# Patient Record
Sex: MALE | Race: BLACK OR AFRICAN AMERICAN | Employment: UNEMPLOYED | URBAN - METROPOLITAN AREA
[De-identification: names, ages, dates, MRNs, and addresses within clinical notes are randomized per-mention and may not be internally consistent; named-entity substitution may affect disease eponyms.]

---

## 2017-03-07 ENCOUNTER — HOSPITAL ENCOUNTER (EMERGENCY)
Age: 22
Discharge: HOME | End: 2017-03-08
Attending: INTERNAL MEDICINE
Payer: SELF-PAY

## 2017-03-07 VITALS — TEMPERATURE: 99.4 F | OXYGEN SATURATION: 100 % | HEART RATE: 94 BPM | RESPIRATION RATE: 20 BRPM

## 2017-03-07 DIAGNOSIS — R11.2 NON-INTRACTABLE VOMITING WITH NAUSEA, UNSPECIFIED VOMITING TYPE: Primary | ICD-10-CM

## 2017-03-07 PROCEDURE — 96374 THER/PROPH/DIAG INJ IV PUSH: CPT

## 2017-03-07 PROCEDURE — 99284 EMERGENCY DEPT VISIT MOD MDM: CPT

## 2017-03-08 LAB
AMPHET UR QL SCN: NEGATIVE
ANION GAP BLD CALC-SCNC: 6 MMOL/L (ref 5–15)
BARBITURATES UR QL SCN: NEGATIVE
BASOPHILS # BLD AUTO: 0 K/UL (ref 0–0.1)
BASOPHILS # BLD: 0 % (ref 0–1)
BENZODIAZ UR QL: NEGATIVE
BUN SERPL-MCNC: 26 MG/DL (ref 6–20)
BUN/CREAT SERPL: 21 (ref 12–20)
CALCIUM SERPL-MCNC: 8.8 MG/DL (ref 8.5–10.1)
CANNABINOIDS UR QL SCN: POSITIVE
CHLORIDE SERPL-SCNC: 96 MMOL/L (ref 97–108)
CO2 SERPL-SCNC: 32 MMOL/L (ref 21–32)
COCAINE UR QL SCN: NEGATIVE
CREAT SERPL-MCNC: 1.21 MG/DL (ref 0.7–1.3)
DRUG SCRN COMMENT,DRGCM: ABNORMAL
EOSINOPHIL # BLD: 0 K/UL (ref 0–0.4)
EOSINOPHIL NFR BLD: 0 % (ref 0–7)
ERYTHROCYTE [DISTWIDTH] IN BLOOD BY AUTOMATED COUNT: 12.4 % (ref 11.5–14.5)
ETHANOL SERPL-MCNC: <10 MG/DL
GLUCOSE SERPL-MCNC: 94 MG/DL (ref 65–100)
HCT VFR BLD AUTO: 50.4 % (ref 36.6–50.3)
HGB BLD-MCNC: 17.2 G/DL (ref 12.1–17)
LYMPHOCYTES # BLD AUTO: 29 % (ref 12–49)
LYMPHOCYTES # BLD: 0.8 K/UL (ref 0.8–3.5)
MCH RBC QN AUTO: 31 PG (ref 26–34)
MCHC RBC AUTO-ENTMCNC: 34.1 G/DL (ref 30–36.5)
MCV RBC AUTO: 91 FL (ref 80–99)
METHADONE UR QL: NEGATIVE
MONOCYTES # BLD: 0.5 K/UL (ref 0–1)
MONOCYTES NFR BLD AUTO: 17 % (ref 5–13)
NEUTS SEG # BLD: 1.5 K/UL (ref 1.8–8)
NEUTS SEG NFR BLD AUTO: 54 % (ref 32–75)
OPIATES UR QL: NEGATIVE
PCP UR QL: NEGATIVE
PLATELET # BLD AUTO: 114 K/UL (ref 150–400)
POTASSIUM SERPL-SCNC: 3.7 MMOL/L (ref 3.5–5.1)
RBC # BLD AUTO: 5.54 M/UL (ref 4.1–5.7)
SODIUM SERPL-SCNC: 134 MMOL/L (ref 136–145)
WBC # BLD AUTO: 2.8 K/UL (ref 4.1–11.1)

## 2017-03-08 PROCEDURE — 80307 DRUG TEST PRSMV CHEM ANLYZR: CPT | Performed by: INTERNAL MEDICINE

## 2017-03-08 PROCEDURE — 80048 BASIC METABOLIC PNL TOTAL CA: CPT | Performed by: INTERNAL MEDICINE

## 2017-03-08 PROCEDURE — 85025 COMPLETE CBC W/AUTO DIFF WBC: CPT | Performed by: INTERNAL MEDICINE

## 2017-03-08 PROCEDURE — 36415 COLL VENOUS BLD VENIPUNCTURE: CPT | Performed by: INTERNAL MEDICINE

## 2017-03-08 PROCEDURE — 74011250637 HC RX REV CODE- 250/637

## 2017-03-08 PROCEDURE — 74011250636 HC RX REV CODE- 250/636: Performed by: INTERNAL MEDICINE

## 2017-03-08 RX ORDER — SODIUM CHLORIDE 0.9 % (FLUSH) 0.9 %
5-10 SYRINGE (ML) INJECTION AS NEEDED
Status: DISCONTINUED | OUTPATIENT
Start: 2017-03-08 | End: 2017-03-08 | Stop reason: HOSPADM

## 2017-03-08 RX ORDER — PANTOPRAZOLE SODIUM 40 MG/1
40 TABLET, DELAYED RELEASE ORAL DAILY
Qty: 5 TAB | Refills: 0 | Status: SHIPPED | OUTPATIENT
Start: 2017-03-08 | End: 2017-03-13

## 2017-03-08 RX ORDER — ONDANSETRON 4 MG/1
4 TABLET, ORALLY DISINTEGRATING ORAL
Qty: 5 TAB | Refills: 0 | Status: SHIPPED | OUTPATIENT
Start: 2017-03-08 | End: 2017-10-13

## 2017-03-08 RX ORDER — ONDANSETRON 2 MG/ML
4 INJECTION INTRAMUSCULAR; INTRAVENOUS
Status: COMPLETED | OUTPATIENT
Start: 2017-03-08 | End: 2017-03-08

## 2017-03-08 RX ORDER — PANTOPRAZOLE SODIUM 40 MG/1
TABLET, DELAYED RELEASE ORAL
Status: COMPLETED
Start: 2017-03-08 | End: 2017-03-08

## 2017-03-08 RX ORDER — SODIUM CHLORIDE 0.9 % (FLUSH) 0.9 %
5-10 SYRINGE (ML) INJECTION EVERY 8 HOURS
Status: DISCONTINUED | OUTPATIENT
Start: 2017-03-08 | End: 2017-03-08 | Stop reason: HOSPADM

## 2017-03-08 RX ORDER — PANTOPRAZOLE SODIUM 40 MG/1
40 TABLET, DELAYED RELEASE ORAL
Status: COMPLETED | OUTPATIENT
Start: 2017-03-08 | End: 2017-03-08

## 2017-03-08 RX ADMIN — PANTOPRAZOLE SODIUM 40 MG: 40 TABLET, DELAYED RELEASE ORAL at 01:22

## 2017-03-08 RX ADMIN — Medication 10 ML: at 00:27

## 2017-03-08 RX ADMIN — ONDANSETRON HYDROCHLORIDE 4 MG: 2 INJECTION, SOLUTION INTRAMUSCULAR; INTRAVENOUS at 00:26

## 2017-03-08 NOTE — ED NOTES
Patient  given copy of dc instructions and 1 script(s). Patient (s verbalized understanding of instructions and script (s). Patient given a current medication reconciliation form and verbalized understanding of their medications. Patient  verbalized understanding of the importance of discussing medications with  his or her physician or clinic they will be following up with. Patient alert and oriented and in no acute distress. Patient discharged home ambulatory.

## 2017-03-08 NOTE — ED NOTES
Emergency Department Nursing Plan of Care       The Nursing Plan of Care is developed from the Nursing assessment and Emergency Department Attending provider initial evaluation. The plan of care may be reviewed in the ED Provider note.     The Plan of Care was developed with the following considerations:   Patient / Family readiness to learn indicated by:verbalized understanding  Persons(s) to be included in education: patient  Barriers to Learning/Limitations:No    [address], RN    3/8/2017   12:23 AM

## 2017-03-08 NOTE — DISCHARGE INSTRUCTIONS
Nausea and Vomiting: Care Instructions  Your Care Instructions    When you are nauseated, you may feel weak and sweaty and notice a lot of saliva in your mouth. Nausea often leads to vomiting. Most of the time you do not need to worry about nausea and vomiting, but they can be signs of other illnesses. Two common causes of nausea and vomiting are stomach flu and food poisoning. Nausea and vomiting from viral stomach flu will usually start to improve within 24 hours. Nausea and vomiting from food poisoning may last from 12 to 48 hours. The doctor has checked you carefully, but problems can develop later. If you notice any problems or new symptoms, get medical treatment right away. Follow-up care is a key part of your treatment and safety. Be sure to make and go to all appointments, and call your doctor if you are having problems. It's also a good idea to know your test results and keep a list of the medicines you take. How can you care for yourself at home? · To prevent dehydration, drink plenty of fluids, enough so that your urine is light yellow or clear like water. Choose water and other caffeine-free clear liquids until you feel better. If you have kidney, heart, or liver disease and have to limit fluids, talk with your doctor before you increase the amount of fluids you drink. · Rest in bed until you feel better. · When you are able to eat, try clear soups, mild foods, and liquids until all symptoms are gone for 12 to 48 hours. Other good choices include dry toast, crackers, cooked cereal, and gelatin dessert, such as Jell-O. When should you call for help? Call 911 anytime you think you may need emergency care. For example, call if:  · You passed out (lost consciousness). Call your doctor now or seek immediate medical care if:  · You have symptoms of dehydration, such as:  ¨ Dry eyes and a dry mouth. ¨ Passing only a little dark urine.   ¨ Feeling thirstier than usual.  · You have new or worsening belly pain. · You have a new or higher fever. · You vomit blood or what looks like coffee grounds. Watch closely for changes in your health, and be sure to contact your doctor if:  · You have ongoing nausea and vomiting. · Your vomiting is getting worse. · Your vomiting lasts longer than 2 days. · You are not getting better as expected. Where can you learn more? Go to http://elma-taqueria.info/. Enter 25 [id number] in the search box to learn more about \"Nausea and Vomiting: Care Instructions. \"  Current as of: May 27, 2016  Content Version: 11.1  © 9252-3614 [employer]. Care instructions adapted under license by [employer] (which disclaims liability or warranty for this information). If you have questions about a medical condition or this instruction, always ask your healthcare professional. [address] any warranty or liability for your use of this information. Nausea and Vomiting: Care Instructions  Your Care Instructions    When you are nauseated, you may feel weak and sweaty and notice a lot of saliva in your mouth. Nausea often leads to vomiting. Most of the time you do not need to worry about nausea and vomiting, but they can be signs of other illnesses. Two common causes of nausea and vomiting are stomach flu and food poisoning. Nausea and vomiting from viral stomach flu will usually start to improve within 24 hours. Nausea and vomiting from food poisoning may last from 12 to 48 hours. The doctor has checked you carefully, but problems can develop later. If you notice any problems or new symptoms, get medical treatment right away. Follow-up care is a key part of your treatment and safety. Be sure to make and go to all appointments, and call your doctor if you are having problems. It's also a good idea to know your test results and keep a list of the medicines you take. How can you care for yourself at home?   · To prevent dehydration, drink plenty of fluids, enough so that your urine is light yellow or clear like water. Choose water and other caffeine-free clear liquids until you feel better. If you have kidney, heart, or liver disease and have to limit fluids, talk with your doctor before you increase the amount of fluids you drink. · Rest in bed until you feel better. · When you are able to eat, try clear soups, mild foods, and liquids until all symptoms are gone for 12 to 48 hours. Other good choices include dry toast, crackers, cooked cereal, and gelatin dessert, such as Jell-O. When should you call for help? Call 911 anytime you think you may need emergency care. For example, call if:  · You passed out (lost consciousness). Call your doctor now or seek immediate medical care if:  · You have symptoms of dehydration, such as:  ¨ Dry eyes and a dry mouth. ¨ Passing only a little dark urine. ¨ Feeling thirstier than usual.  · You have new or worsening belly pain. · You have a new or higher fever. · You vomit blood or what looks like coffee grounds. Watch closely for changes in your health, and be sure to contact your doctor if:  · You have ongoing nausea and vomiting. · Your vomiting is getting worse. · Your vomiting lasts longer than 2 days. · You are not getting better as expected. Where can you learn more? Go to http://elma-taqueria.info/. Enter 25 [id number] in the search box to learn more about \"Nausea and Vomiting: Care Instructions. \"  Current as of: May 27, 2016  Content Version: 11.1  © 7711-5435 [employer]. Care instructions adapted under license by [employer] (which disclaims liability or warranty for this information). If you have questions about a medical condition or this instruction, always ask your healthcare professional. [address] any warranty or liability for your use of this information.

## 2017-03-08 NOTE — ED PROVIDER NOTES
HPI Comments: Danitza Alexis is a 22 y.o. Male who presents via EMS to the ED c/o fatigue, constipation, nausea, and vomiting x 3 days. Pt does not state any exacerbating or relieving factors for his sx's. Pt reports that he ate at a \"[employer]\" at [address] [place] [address] ~3 days ago before the sx's started. Pt reports that he tried to go to [facility] yesterday for treatment, but reports that he left before he could be treated due to the long wait time in the waiting room. Pt reports that his last episode of vomiting was ~30 minutes PTA. Upon evaluation, pt does not have any pain complaints. Pt denies having any other significant medical problems. Pt specifically denies fever, chest pain, SOB, and headaches. PCP: None  Social Hx: (+) tobacco usage, (-) EtOH intake, (-) Illicit drug usage; There are no other complaints, changes or physical findings at this time. This note is prepared by Diana Raman, acting as Scribe for Dione Hernandez MD.    The history is provided by the patient and the EMS personnel. No  was used. History reviewed. No pertinent past medical history. History reviewed. No pertinent surgical history. History reviewed. No pertinent family history. Social History     Social History    Marital status: SINGLE     Spouse name: N/A    Number of children: N/A    Years of education: N/A     Occupational History    Not on file. Social History Main Topics    Smoking status: Never Smoker    Smokeless tobacco: Not on file    Alcohol use No    Drug use: Yes     Special: Marijuana    Sexual activity: Yes     Partners: Female     Birth control/ protection: Condom     Other Topics Concern    Not on file     Social History Narrative         ALLERGIES: Review of patient's allergies indicates no known allergies. Review of Systems   Constitutional: Positive for fatigue. Negative for appetite change, chills and fever. HENT: Negative.   Negative for congestion, rhinorrhea, sinus pressure and sore throat. Eyes: Negative. Respiratory: Negative. Negative for cough, choking, chest tightness, shortness of breath and wheezing. Cardiovascular: Negative. Negative for chest pain, palpitations and leg swelling. Gastrointestinal: Positive for constipation, nausea and vomiting. Negative for abdominal pain and diarrhea. Endocrine: Negative. Genitourinary: Negative. Negative for difficulty urinating, dysuria, flank pain and urgency. Musculoskeletal: Negative. Skin: Negative. Neurological: Negative. Negative for dizziness, speech difficulty, weakness, light-headedness, numbness and headaches. Psychiatric/Behavioral: Negative. All other systems reviewed and are negative. Vitals:    03/07/17 2357   Pulse: 94   Resp: 20   Temp: 99.4 °F (37.4 °C)   SpO2: 100%            Physical Exam   Constitutional: He is oriented to person, place, and time. He appears well-developed and well-nourished. No distress. HENT:   Head: Normocephalic and atraumatic. Mouth/Throat: Oropharynx is clear and moist. No oropharyngeal exudate. Eyes: Conjunctivae and EOM are normal. Pupils are equal, round, and reactive to light. Neck: Normal range of motion. Neck supple. No JVD present. No tracheal deviation present. Cardiovascular: Normal rate, regular rhythm, normal heart sounds and intact distal pulses. No murmur heard. Pulmonary/Chest: Effort normal and breath sounds normal. No stridor. No respiratory distress. He has no wheezes. He has no rales. He exhibits no tenderness. Abdominal: Soft. He exhibits no distension. There is no tenderness. There is no rebound and no guarding. Musculoskeletal: Normal range of motion. He exhibits no edema or tenderness. Neurological: He is alert and oriented to person, place, and time. Skin: Skin is warm and dry. He is not diaphoretic. Psychiatric: He has a normal mood and affect.  His behavior is normal.   Nursing note and vitals reviewed. MDM  Number of Diagnoses or Management Options  Diagnosis management comments:     DDx: metabolic disorder, viral syndrome, food poisoning       Amount and/or Complexity of Data Reviewed  Clinical lab tests: ordered and reviewed  Obtain history from someone other than the patient: yes (EMS)  Review and summarize past medical records: yes    Patient Progress  Patient progress: stable    ED Course       Procedures      PROGRESS NOTE  1:14 AM  Pt was re-assessed. Pt reports that he is feeling better. Written by Roseanna Browne ED Scribe, as dictated by Marylen Mouse, MD.      LABORATORY TESTS:  Recent Results (from the past 12 hour(s))   CBC WITH AUTOMATED DIFF    Collection Time: 03/08/17 12:15 AM   Result Value Ref Range    WBC 2.8 (L) 4.1 - 11.1 K/uL    RBC 5.54 4.10 - 5.70 M/uL    HGB 17.2 (H) 12.1 - 17.0 g/dL    HCT 50.4 (H) 36.6 - 50.3 %    MCV 91.0 80.0 - 99.0 FL    MCH 31.0 26.0 - 34.0 PG    MCHC 34.1 30.0 - 36.5 g/dL    RDW 12.4 11.5 - 14.5 %    PLATELET 647 (L) 984 - 400 K/uL    NEUTROPHILS 54 32 - 75 %    LYMPHOCYTES 29 12 - 49 %    MONOCYTES 17 (H) 5 - 13 %    EOSINOPHILS 0 0 - 7 %    BASOPHILS 0 0 - 1 %    ABS. NEUTROPHILS 1.5 (L) 1.8 - 8.0 K/UL    ABS. LYMPHOCYTES 0.8 0.8 - 3.5 K/UL    ABS. MONOCYTES 0.5 0.0 - 1.0 K/UL    ABS. EOSINOPHILS 0.0 0.0 - 0.4 K/UL    ABS.  BASOPHILS 0.0 0.0 - 0.1 K/UL   METABOLIC PANEL, BASIC    Collection Time: 03/08/17 12:15 AM   Result Value Ref Range    Sodium 134 (L) 136 - 145 mmol/L    Potassium 3.7 3.5 - 5.1 mmol/L    Chloride 96 (L) 97 - 108 mmol/L    CO2 32 21 - 32 mmol/L    Anion gap 6 5 - 15 mmol/L    Glucose 94 65 - 100 mg/dL    BUN 26 (H) 6 - 20 MG/DL    Creatinine 1.21 0.70 - 1.30 MG/DL    BUN/Creatinine ratio 21 (H) 12 - 20      GFR est AA >60 >60 ml/min/1.73m2    GFR est non-AA >60 >60 ml/min/1.73m2    Calcium 8.8 8.5 - 10.1 MG/DL   DRUG SCREEN, URINE    Collection Time: 03/08/17 12:15 AM   Result Value Ref Range    AMPHETAMINE NEGATIVE NEG      BARBITURATES NEGATIVE  NEG      BENZODIAZEPINE NEGATIVE  NEG      COCAINE NEGATIVE  NEG      METHADONE NEGATIVE  NEG      OPIATES NEGATIVE  NEG      PCP(PHENCYCLIDINE) NEGATIVE  NEG      THC (TH-CANNABINOL) POSITIVE (A) NEG      Drug screen comment (NOTE)    ETHYL ALCOHOL    Collection Time: 17 12:15 AM   Result Value Ref Range    ALCOHOL(ETHYL),SERUM <10 <10 MG/DL       IMAGING RESULTS:  No orders to display       MEDICATIONS GIVEN:  Medications   sodium chloride (NS) flush 5-10 mL (10 mL IntraVENous Given 3/8/17 0027)   sodium chloride (NS) flush 5-10 mL (not administered)   pantoprazole (PROTONIX) tablet 40 mg (not administered)   ondansetron (ZOFRAN) injection 4 mg (4 mg IntraVENous Given 3/8/17 0026)       IMPRESSION:  1. Non-intractable vomiting with nausea, unspecified vomiting type        PLAN:  1. Current Discharge Medication List      START taking these medications    Details   pantoprazole (PROTONIX) 40 mg tablet Take 1 Tab by mouth daily for 5 days. Qty: 5 Tab, Refills: 0      ondansetron (ZOFRAN ODT) 4 mg disintegrating tablet Take 1 Tab by mouth every eight (8) hours as needed for Nausea for up to 5 doses. Qty: 5 Tab, Refills: 0         CONTINUE these medications which have NOT CHANGED    Details   mupirocin (BACTROBAN) 2 % ointment Apply  to affected area three (3) times daily. Apply to area for 10 days  Qty: 22 g, Refills: 0         STOP taking these medications       ibuprofen (MOTRIN) 600 mg tablet Comments:   Reason for Stoppin.   Follow-up Information     Follow up With Details Comments Contact Info    None   None ([phone]) Patient stated that they have no PCP      Jeanette Gill Lin 1428 In 2 days return to ED if more ill. Clear liquid diet for next 6 hours [facility]        Return to ED if worse     DISCHARGE NOTE  1:16 AM  The patient has been re-evaluated and is ready for discharge.  Reviewed available results with patient. Counseled pt on diagnosis and care plan. Pt has expressed understanding, and all questions have been answered. Pt agrees with plan and agrees to F/U as recommended, or return to the ED if their sxs worsen. Discharge instructions have been provided and explained to the pt, along with reasons to return to the ED. Written by Penny Bacon, ED Scribe, as dictated by Wendy Antunez MD.    This note is prepared by Penny Bacon, acting as Scribe for Wendy Antunez MD.    Wendy Antunez MD: The scribe's documentation has been prepared under my direction and personally reviewed by me in its entirety. I confirm that the note above accurately reflects all work, treatment, procedures, and medical decision making performed by me.

## 2017-06-18 ENCOUNTER — HOSPITAL ENCOUNTER (EMERGENCY)
Age: 22
Discharge: HOME | End: 2017-06-18
Attending: EMERGENCY MEDICINE
Payer: MEDICAID

## 2017-06-18 VITALS
HEIGHT: 72 IN | SYSTOLIC BLOOD PRESSURE: 138 MMHG | WEIGHT: 132 LBS | HEART RATE: 114 BPM | BODY MASS INDEX: 17.88 KG/M2 | DIASTOLIC BLOOD PRESSURE: 107 MMHG | TEMPERATURE: 98.2 F | RESPIRATION RATE: 16 BRPM

## 2017-06-18 DIAGNOSIS — L98.421 SKIN ULCER OF SACRUM, LIMITED TO BREAKDOWN OF SKIN (HCC): Primary | ICD-10-CM

## 2017-06-18 PROCEDURE — 99283 EMERGENCY DEPT VISIT LOW MDM: CPT

## 2017-06-18 NOTE — ED NOTES
Emergency Department Nursing Plan of Care       The Nursing Plan of Care is developed from the Nursing assessment and Emergency Department Attending provider initial evaluation. The plan of care may be reviewed in the ED Provider note.     The Plan of Care was developed with the following considerations:   Patient / Family readiness to learn indicated by:verbalized understanding  Persons(s) to be included in education: patient and family  Barriers to Learning/Limitations:No    Signed     Russell Rendon RN    6/18/2017   4:44 PM

## 2017-06-18 NOTE — ED PROVIDER NOTES
Patient is a 25 y.o. male presenting with skin ulcer. The history is provided by the patient. No  was used. Skin Ulcer    This is a new problem. The current episode started more than 2 days ago. The problem has not changed since onset. Associated with: paraplegic wheelchair bound. There has been no fever. Affected Location: sacrum. The pain is at a severity of 0/10. The patient is experiencing no pain. Risk factors: ulcer. He has tried nothing for the symptoms. History reviewed. No pertinent past medical history. History reviewed. No pertinent surgical history. History reviewed. No pertinent family history. Social History     Social History    Marital status: SINGLE     Spouse name: N/A    Number of children: N/A    Years of education: N/A     Occupational History    Not on file. Social History Main Topics    Smoking status: Never Smoker    Smokeless tobacco: Not on file    Alcohol use No    Drug use: Yes     Special: Marijuana    Sexual activity: Yes     Partners: Female     Birth control/ protection: Condom     Other Topics Concern    Not on file     Social History Narrative         ALLERGIES: Review of patient's allergies indicates no known allergies. Review of Systems   Constitutional: Negative for chills, fatigue and fever. HENT: Negative for congestion and sore throat. Eyes: Negative for redness. Respiratory: Negative for cough, chest tightness and wheezing. Cardiovascular: Negative for chest pain. Gastrointestinal: Negative for abdominal pain. Genitourinary: Negative for dysuria. Musculoskeletal: Negative for neck pain. Skin: Negative for rash. Neurological: Negative for dizziness, syncope, weakness, light-headedness, numbness and headaches. Paraplegia   Hematological: Negative for adenopathy. Psychiatric/Behavioral: Negative for agitation and behavioral problems. All other systems reviewed and are negative.       Vitals: 06/18/17 1542   BP: (!) 138/107   Pulse: (!) 114   Resp: 16   Temp: 98.2 °F (36.8 °C)   Weight: 59.9 kg (132 lb)   Height: 6' (1.829 m)            Physical Exam   Constitutional: He is oriented to person, place, and time. He appears well-developed and well-nourished. HENT:   Head: Normocephalic and atraumatic. Right Ear: External ear normal.   Mouth/Throat: Oropharynx is clear and moist.   Eyes: Conjunctivae are normal. Right eye exhibits no discharge. Left eye exhibits no discharge. Neck: Normal range of motion. Neck supple. Cardiovascular: Normal rate and regular rhythm. Pulmonary/Chest: Effort normal and breath sounds normal. No respiratory distress. He has no wheezes. Abdominal: Soft. Bowel sounds are normal. There is no tenderness. Musculoskeletal:   Paraplegic    Lymphadenopathy:     He has no cervical adenopathy. Neurological: He is alert and oriented to person, place, and time. No cranial nerve deficit. Skin: Skin is warm and dry. Psychiatric: He has a normal mood and affect. His behavior is normal. Judgment and thought content normal.   Nursing note and vitals reviewed. MDM  Number of Diagnoses or Management Options  Diagnosis management comments: DDX skin ulcer stage 1 v 2       Amount and/or Complexity of Data Reviewed  Review and summarize past medical records: yes  Discuss the patient with other providers: yes      ED Course       Procedures  IMPRESSION  Skin ulcer  PLAN  Pressure dressing   F/. u wound care and PCP

## 2017-06-18 NOTE — DISCHARGE INSTRUCTIONS
Pressure Sores: Care Instructions  Your Care Instructions    A pressure sore is an injury to the skin caused by constant pressure. These sores--also called decubitus ulcers or bedsores--may happen when you lie in bed or sit in a wheelchair for a long time. The constant pressure blocks the blood supply to the skin. This causes skin cells to die and creates a sore. Pressure sores usually occur over bony areas, such as the hips, lower back, elbows, heels, and shoulders. They also can occur in places where the skin folds over on itself. You may have mild redness or open sores that are harder to heal.  Good care at home can help heal pressure sores. You or your caregiver needs to check your skin every day for sores. You need good nutrition and plenty of fluids to keep your skin healthy and prevent new pressure sores. Follow-up care is a key part of your treatment and safety. Be sure to make and go to all appointments, and call your doctor if you are having problems. It's also a good idea to know your test results and keep a list of the medicines you take. How can you care for yourself at home? · If your doctor prescribed a medicated ointment or cream, use it exactly as prescribed. Call your doctor if you think you are having a problem with your medicine. · Wash pressure sores every day, or as often as your doctor recommends. Most tap water is safe, but follow the advice of your doctor or nurse. He or she may recommend that you use a saline solution. This is a salt and water solution that you can buy over the counter. · Put on bandages as your doctor or wound care specialist says. · Keep healthy tissue around the sore clean and dry. · Check your skin every day for sores (or have a caregiver do it). · If you know what is causing the pressure that caused the sore, find a way to remove that pressure. To prevent pressure sores  · Change your position or have your caregiver help you change your position often. You may need to do this every 2 hours if you are in bed or every 15 minutes if you are in a wheelchair. This lowers the chance of making sores worse and getting new sores. · Use special mattresses or other support. These may include low-pressure mattresses or cushions made of foam that can be filled with air, water, beads, or fiber. · Eat healthy foods with plenty of protein to help heal damaged skin and to help new skin grow. · Try to stay at a healthy weight. Being overweight can lead to more pressure on your skin. · Do not slide across sheets or slump in a chair or bed. · Do not smoke. Smoking dries the skin and reduces its blood supply. If you need help quitting, talk to your doctor about stop-smoking programs and medicines. These can increase your chances of quitting for good. When should you call for help? Call your doctor now or seek immediate medical care if:  · You have signs of infection, such as:  ¨ Increased pain, swelling, warmth, or redness. ¨ Red streaks leading from the sore. ¨ Pus draining from the sore. ¨ A fever. Watch closely for changes in your health, and be sure to contact your doctor if:  · Your pressure sores are not healing. · You have new pressure sores. · You need help changing positions in bed or in a chair. · Your caregiver needs help to move you. Where can you learn more? Go to http://elma-taqueria.info/. Enter F114 in the search box to learn more about \"Pressure Sores: Care Instructions. \"  Current as of: June 4, 2016  Content Version: 11.2  © 4457-9329 [employer]. Care instructions adapted under license by [employer] (which disclaims liability or warranty for this information). If you have questions about a medical condition or this instruction, always ask your healthcare professional. [address] any warranty or liability for your use of this information.

## 2017-08-24 ENCOUNTER — HOSPITAL ENCOUNTER (EMERGENCY)
Age: 22
Discharge: HOME | End: 2017-08-24
Attending: INTERNAL MEDICINE
Payer: MEDICAID

## 2017-08-24 VITALS
HEIGHT: 72 IN | HEART RATE: 104 BPM | TEMPERATURE: 98.1 F | SYSTOLIC BLOOD PRESSURE: 116 MMHG | OXYGEN SATURATION: 100 % | BODY MASS INDEX: 17.61 KG/M2 | WEIGHT: 130 LBS | RESPIRATION RATE: 18 BRPM | DIASTOLIC BLOOD PRESSURE: 71 MMHG

## 2017-08-24 DIAGNOSIS — G89.4 CHRONIC PAIN SYNDROME: ICD-10-CM

## 2017-08-24 DIAGNOSIS — L89.153 DECUBITUS ULCER OF SACRAL REGION, STAGE 3 (HCC): ICD-10-CM

## 2017-08-24 DIAGNOSIS — G82.20 PARAPLEGIA FOLLOWING SPINAL CORD INJURY (HCC): Primary | ICD-10-CM

## 2017-08-24 PROCEDURE — 74011250637 HC RX REV CODE- 250/637: Performed by: INTERNAL MEDICINE

## 2017-08-24 PROCEDURE — 99284 EMERGENCY DEPT VISIT MOD MDM: CPT

## 2017-08-24 RX ORDER — GABAPENTIN 100 MG/1
400 CAPSULE ORAL
Status: COMPLETED | OUTPATIENT
Start: 2017-08-24 | End: 2017-08-24

## 2017-08-24 RX ORDER — GABAPENTIN 600 MG/1
600 TABLET ORAL 3 TIMES DAILY
Qty: 15 TAB | Refills: 0 | Status: SHIPPED | OUTPATIENT
Start: 2017-08-24 | End: 2017-08-29

## 2017-08-24 RX ADMIN — GABAPENTIN 400 MG: 100 CAPSULE ORAL at 02:35

## 2017-08-24 NOTE — ED NOTES
Patient (s) was given copy of dc instructions and one paper script(s) and no electronic scripts. Patient (s) has verbalized understanding of instructions and script (s). Patient given a current medication reconciliation form and verbalized understanding of their medications. Patient (s) has verbalized understanding of the importance of discussing medications with  his or her physician or clinic they will be following up with. Patient alert and oriented and in no acute distress. Patient offered wheelchair from treatment area to hospital entrance, patient used his own wheelchair. Patient left ED with family.

## 2017-08-24 NOTE — DISCHARGE INSTRUCTIONS
Pressure Sores: Care Instructions  Your Care Instructions    A pressure sore is an injury to the skin caused by constant pressure. These sores--also called decubitus ulcers or bedsores--may happen when you lie in bed or sit in a wheelchair for a long time. The constant pressure blocks the blood supply to the skin. This causes skin cells to die and creates a sore. Pressure sores usually occur over bony areas, such as the hips, lower back, elbows, heels, and shoulders. They also can occur in places where the skin folds over on itself. You may have mild redness or open sores that are harder to heal.  Good care at home can help heal pressure sores. You or your caregiver needs to check your skin every day for sores. You need good nutrition and plenty of fluids to keep your skin healthy and prevent new pressure sores. Follow-up care is a key part of your treatment and safety. Be sure to make and go to all appointments, and call your doctor if you are having problems. It's also a good idea to know your test results and keep a list of the medicines you take. How can you care for yourself at home? · If your doctor prescribed a medicated ointment or cream, use it exactly as prescribed. Call your doctor if you think you are having a problem with your medicine. · Wash pressure sores every day, or as often as your doctor recommends. Most tap water is safe, but follow the advice of your doctor or nurse. He or she may recommend that you use a saline solution. This is a salt and water solution that you can buy over the counter. · Put on bandages as your doctor or wound care specialist says. · Keep healthy tissue around the sore clean and dry. · Check your skin every day for sores (or have a caregiver do it). · If you know what is causing the pressure that caused the sore, find a way to remove that pressure. To prevent pressure sores  · Change your position or have your caregiver help you change your position often. You may need to do this every 2 hours if you are in bed or every 15 minutes if you are in a wheelchair. This lowers the chance of making sores worse and getting new sores. · Use special mattresses or other support. These may include low-pressure mattresses or cushions made of foam that can be filled with air, water, beads, or fiber. · Eat healthy foods with plenty of protein to help heal damaged skin and to help new skin grow. · Try to stay at a healthy weight. Being overweight can lead to more pressure on your skin. · Do not slide across sheets or slump in a chair or bed. · Do not smoke. Smoking dries the skin and reduces its blood supply. If you need help quitting, talk to your doctor about stop-smoking programs and medicines. These can increase your chances of quitting for good. When should you call for help? Call your doctor now or seek immediate medical care if:  · You have signs of infection, such as:  ¨ Increased pain, swelling, warmth, or redness. ¨ Red streaks leading from the sore. ¨ Pus draining from the sore. ¨ A fever. Watch closely for changes in your health, and be sure to contact your doctor if:  · Your pressure sores are not healing. · You have new pressure sores. · You need help changing positions in bed or in a chair. · Your caregiver needs help to move you. Where can you learn more? Go to http://elma-taqueria.info/. Enter F114 in the search box to learn more about \"Pressure Sores: Care Instructions. \"  Current as of: March 20, 2017  Content Version: 11.3  © 3043-6536 [employer]. Care instructions adapted under license by [employer] (which disclaims liability or warranty for this information). If you have questions about a medical condition or this instruction, always ask your healthcare professional. [address] any warranty or liability for your use of this information.          Pressure Sores: Care Instructions  Your Care Instructions    A pressure sore is an injury to the skin caused by constant pressure. These sores--also called decubitus ulcers or bedsores--may happen when you lie in bed or sit in a wheelchair for a long time. The constant pressure blocks the blood supply to the skin. This causes skin cells to die and creates a sore. Pressure sores usually occur over bony areas, such as the hips, lower back, elbows, heels, and shoulders. They also can occur in places where the skin folds over on itself. You may have mild redness or open sores that are harder to heal.  Good care at home can help heal pressure sores. You or your caregiver needs to check your skin every day for sores. You need good nutrition and plenty of fluids to keep your skin healthy and prevent new pressure sores. Follow-up care is a key part of your treatment and safety. Be sure to make and go to all appointments, and call your doctor if you are having problems. It's also a good idea to know your test results and keep a list of the medicines you take. How can you care for yourself at home? · If your doctor prescribed a medicated ointment or cream, use it exactly as prescribed. Call your doctor if you think you are having a problem with your medicine. · Wash pressure sores every day, or as often as your doctor recommends. Most tap water is safe, but follow the advice of your doctor or nurse. He or she may recommend that you use a saline solution. This is a salt and water solution that you can buy over the counter. · Put on bandages as your doctor or wound care specialist says. · Keep healthy tissue around the sore clean and dry. · Check your skin every day for sores (or have a caregiver do it). · If you know what is causing the pressure that caused the sore, find a way to remove that pressure. To prevent pressure sores  · Change your position or have your caregiver help you change your position often.  You may need to do this every 2 hours if you are in bed or every 15 minutes if you are in a wheelchair. This lowers the chance of making sores worse and getting new sores. · Use special mattresses or other support. These may include low-pressure mattresses or cushions made of foam that can be filled with air, water, beads, or fiber. · Eat healthy foods with plenty of protein to help heal damaged skin and to help new skin grow. · Try to stay at a healthy weight. Being overweight can lead to more pressure on your skin. · Do not slide across sheets or slump in a chair or bed. · Do not smoke. Smoking dries the skin and reduces its blood supply. If you need help quitting, talk to your doctor about stop-smoking programs and medicines. These can increase your chances of quitting for good. When should you call for help? Call your doctor now or seek immediate medical care if:  · You have signs of infection, such as:  ¨ Increased pain, swelling, warmth, or redness. ¨ Red streaks leading from the sore. ¨ Pus draining from the sore. ¨ A fever. Watch closely for changes in your health, and be sure to contact your doctor if:  · Your pressure sores are not healing. · You have new pressure sores. · You need help changing positions in bed or in a chair. · Your caregiver needs help to move you. Where can you learn more? Go to http://elma-taqueria.info/. Enter F114 in the search box to learn more about \"Pressure Sores: Care Instructions. \"  Current as of: March 20, 2017  Content Version: 11.3  © 5937-8369 [employer]. Care instructions adapted under license by "[employer]" (which disclaims liability or warranty for this information). If you have questions about a medical condition or this instruction, always ask your healthcare professional. [address] any warranty or liability for your use of this information.
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
## 2017-10-11 ENCOUNTER — APPOINTMENT (OUTPATIENT)
Dept: GENERAL RADIOLOGY | Age: 22
End: 2017-10-11
Attending: PHYSICIAN ASSISTANT
Payer: MEDICAID

## 2017-10-11 ENCOUNTER — HOSPITAL ENCOUNTER (EMERGENCY)
Age: 22
Discharge: HOME | End: 2017-10-11
Attending: EMERGENCY MEDICINE | Admitting: INTERNAL MEDICINE
Payer: MEDICAID

## 2017-10-11 VITALS
HEART RATE: 129 BPM | SYSTOLIC BLOOD PRESSURE: 139 MMHG | RESPIRATION RATE: 23 BRPM | DIASTOLIC BLOOD PRESSURE: 79 MMHG | WEIGHT: 127.31 LBS | HEIGHT: 72 IN | TEMPERATURE: 100.5 F | BODY MASS INDEX: 17.24 KG/M2 | OXYGEN SATURATION: 100 %

## 2017-10-11 DIAGNOSIS — N39.0 URINARY TRACT INFECTION WITHOUT HEMATURIA, SITE UNSPECIFIED: Primary | ICD-10-CM

## 2017-10-11 LAB
ALBUMIN SERPL-MCNC: 2.7 G/DL (ref 3.5–5)
ALBUMIN/GLOB SERPL: 0.5 {RATIO} (ref 1.1–2.2)
ALP SERPL-CCNC: 86 U/L (ref 45–117)
ALT SERPL-CCNC: 20 U/L (ref 12–78)
AMORPH CRY URNS QL MICRO: ABNORMAL
AMPHET UR QL SCN: NEGATIVE
ANION GAP SERPL CALC-SCNC: 11 MMOL/L (ref 5–15)
APPEARANCE UR: ABNORMAL
AST SERPL-CCNC: 20 U/L (ref 15–37)
BACTERIA URNS QL MICRO: ABNORMAL /HPF
BARBITURATES UR QL SCN: NEGATIVE
BASOPHILS # BLD: 0 K/UL (ref 0–0.1)
BASOPHILS NFR BLD: 0 % (ref 0–1)
BENZODIAZ UR QL: NEGATIVE
BILIRUB SERPL-MCNC: 0.8 MG/DL (ref 0.2–1)
BILIRUB UR QL: NEGATIVE
BUN SERPL-MCNC: 10 MG/DL (ref 6–20)
BUN/CREAT SERPL: 18 (ref 12–20)
CALCIUM SERPL-MCNC: 9.3 MG/DL (ref 8.5–10.1)
CANNABINOIDS UR QL SCN: POSITIVE
CHLORIDE SERPL-SCNC: 92 MMOL/L (ref 97–108)
CO2 SERPL-SCNC: 25 MMOL/L (ref 21–32)
COCAINE UR QL SCN: NEGATIVE
COLOR UR: ABNORMAL
CREAT SERPL-MCNC: 0.56 MG/DL (ref 0.7–1.3)
DRUG SCRN COMMENT,DRGCM: ABNORMAL
EOSINOPHIL # BLD: 0 K/UL (ref 0–0.4)
EOSINOPHIL NFR BLD: 0 % (ref 0–7)
EPITH CASTS URNS QL MICRO: ABNORMAL /LPF
ERYTHROCYTE [DISTWIDTH] IN BLOOD BY AUTOMATED COUNT: 12.5 % (ref 11.5–14.5)
GLOBULIN SER CALC-MCNC: 5.6 G/DL (ref 2–4)
GLUCOSE SERPL-MCNC: 83 MG/DL (ref 65–100)
GLUCOSE UR STRIP.AUTO-MCNC: NEGATIVE MG/DL
HCT VFR BLD AUTO: 36.2 % (ref 36.6–50.3)
HGB BLD-MCNC: 12 G/DL (ref 12.1–17)
HGB UR QL STRIP: NEGATIVE
KETONES UR QL STRIP.AUTO: NEGATIVE MG/DL
LACTATE SERPL-SCNC: 0.8 MMOL/L (ref 0.4–2)
LEUKOCYTE ESTERASE UR QL STRIP.AUTO: ABNORMAL
LIPASE SERPL-CCNC: 59 U/L (ref 73–393)
LYMPHOCYTES # BLD: 1 K/UL (ref 0.8–3.5)
LYMPHOCYTES NFR BLD: 11 % (ref 12–49)
MCH RBC QN AUTO: 29.8 PG (ref 26–34)
MCHC RBC AUTO-ENTMCNC: 33.1 G/DL (ref 30–36.5)
MCV RBC AUTO: 89.8 FL (ref 80–99)
METHADONE UR QL: NEGATIVE
MONOCYTES # BLD: 0.5 K/UL (ref 0–1)
MONOCYTES NFR BLD: 5 % (ref 5–13)
NEUTS SEG # BLD: 8 K/UL (ref 1.8–8)
NEUTS SEG NFR BLD: 84 % (ref 32–75)
NITRITE UR QL STRIP.AUTO: NEGATIVE
OPIATES UR QL: NEGATIVE
PCP UR QL: NEGATIVE
PH UR STRIP: 7.5 [PH] (ref 5–8)
PLATELET # BLD AUTO: 393 K/UL (ref 150–400)
POTASSIUM SERPL-SCNC: 3.7 MMOL/L (ref 3.5–5.1)
PROT SERPL-MCNC: 8.3 G/DL (ref 6.4–8.2)
PROT UR STRIP-MCNC: NEGATIVE MG/DL
RBC # BLD AUTO: 4.03 M/UL (ref 4.1–5.7)
RBC #/AREA URNS HPF: ABNORMAL /HPF (ref 0–5)
SODIUM SERPL-SCNC: 128 MMOL/L (ref 136–145)
SP GR UR REFRACTOMETRY: 1.02 (ref 1–1.03)
UA: UC IF INDICATED,UAUC: ABNORMAL
UROBILINOGEN UR QL STRIP.AUTO: >8 EU/DL (ref 0.2–1)
WBC # BLD AUTO: 9.5 K/UL (ref 4.1–11.1)
WBC URNS QL MICRO: ABNORMAL /HPF (ref 0–4)

## 2017-10-11 PROCEDURE — 87077 CULTURE AEROBIC IDENTIFY: CPT | Performed by: PHYSICIAN ASSISTANT

## 2017-10-11 PROCEDURE — 87185 SC STD ENZYME DETCJ PER NZM: CPT | Performed by: PHYSICIAN ASSISTANT

## 2017-10-11 PROCEDURE — 74011000258 HC RX REV CODE- 258: Performed by: PHYSICIAN ASSISTANT

## 2017-10-11 PROCEDURE — 85025 COMPLETE CBC W/AUTO DIFF WBC: CPT | Performed by: PHYSICIAN ASSISTANT

## 2017-10-11 PROCEDURE — 74011250637 HC RX REV CODE- 250/637: Performed by: PHYSICIAN ASSISTANT

## 2017-10-11 PROCEDURE — 80307 DRUG TEST PRSMV CHEM ANLYZR: CPT | Performed by: PHYSICIAN ASSISTANT

## 2017-10-11 PROCEDURE — 71010 XR CHEST PORT: CPT

## 2017-10-11 PROCEDURE — 83605 ASSAY OF LACTIC ACID: CPT | Performed by: PHYSICIAN ASSISTANT

## 2017-10-11 PROCEDURE — 87086 URINE CULTURE/COLONY COUNT: CPT | Performed by: PHYSICIAN ASSISTANT

## 2017-10-11 PROCEDURE — 96365 THER/PROPH/DIAG IV INF INIT: CPT

## 2017-10-11 PROCEDURE — 99285 EMERGENCY DEPT VISIT HI MDM: CPT

## 2017-10-11 PROCEDURE — 87040 BLOOD CULTURE FOR BACTERIA: CPT | Performed by: PHYSICIAN ASSISTANT

## 2017-10-11 PROCEDURE — 80053 COMPREHEN METABOLIC PANEL: CPT | Performed by: PHYSICIAN ASSISTANT

## 2017-10-11 PROCEDURE — 94762 N-INVAS EAR/PLS OXIMTRY CONT: CPT

## 2017-10-11 PROCEDURE — 87491 CHLMYD TRACH DNA AMP PROBE: CPT | Performed by: PHYSICIAN ASSISTANT

## 2017-10-11 PROCEDURE — 93005 ELECTROCARDIOGRAM TRACING: CPT

## 2017-10-11 PROCEDURE — 87147 CULTURE TYPE IMMUNOLOGIC: CPT | Performed by: PHYSICIAN ASSISTANT

## 2017-10-11 PROCEDURE — 87186 SC STD MICRODIL/AGAR DIL: CPT | Performed by: PHYSICIAN ASSISTANT

## 2017-10-11 PROCEDURE — 96375 TX/PRO/DX INJ NEW DRUG ADDON: CPT

## 2017-10-11 PROCEDURE — 87076 CULTURE ANAEROBE IDENT EACH: CPT | Performed by: PHYSICIAN ASSISTANT

## 2017-10-11 PROCEDURE — 96361 HYDRATE IV INFUSION ADD-ON: CPT

## 2017-10-11 PROCEDURE — 81001 URINALYSIS AUTO W/SCOPE: CPT | Performed by: PHYSICIAN ASSISTANT

## 2017-10-11 PROCEDURE — 83690 ASSAY OF LIPASE: CPT | Performed by: PHYSICIAN ASSISTANT

## 2017-10-11 PROCEDURE — 74011250636 HC RX REV CODE- 250/636: Performed by: PHYSICIAN ASSISTANT

## 2017-10-11 PROCEDURE — 36415 COLL VENOUS BLD VENIPUNCTURE: CPT | Performed by: PHYSICIAN ASSISTANT

## 2017-10-11 RX ORDER — ACETAMINOPHEN 500 MG
1000 TABLET ORAL
Status: COMPLETED | OUTPATIENT
Start: 2017-10-11 | End: 2017-10-11

## 2017-10-11 RX ORDER — AZITHROMYCIN 250 MG/1
1000 TABLET, FILM COATED ORAL
Status: COMPLETED | OUTPATIENT
Start: 2017-10-11 | End: 2017-10-11

## 2017-10-11 RX ORDER — ONDANSETRON 2 MG/ML
4 INJECTION INTRAMUSCULAR; INTRAVENOUS
Status: COMPLETED | OUTPATIENT
Start: 2017-10-11 | End: 2017-10-11

## 2017-10-11 RX ORDER — SODIUM CHLORIDE 0.9 % (FLUSH) 0.9 %
5-10 SYRINGE (ML) INJECTION EVERY 8 HOURS
Status: DISCONTINUED | OUTPATIENT
Start: 2017-10-11 | End: 2017-10-11 | Stop reason: HOSPADM

## 2017-10-11 RX ORDER — CIPROFLOXACIN 500 MG/1
500 TABLET ORAL 2 TIMES DAILY
Qty: 14 TAB | Refills: 0 | Status: ON HOLD | OUTPATIENT
Start: 2017-10-11 | End: 2017-10-13 | Stop reason: SDDI

## 2017-10-11 RX ORDER — SODIUM CHLORIDE 0.9 % (FLUSH) 0.9 %
5-10 SYRINGE (ML) INJECTION AS NEEDED
Status: DISCONTINUED | OUTPATIENT
Start: 2017-10-11 | End: 2017-10-11 | Stop reason: HOSPADM

## 2017-10-11 RX ADMIN — ONDANSETRON 4 MG: 2 INJECTION, SOLUTION INTRAMUSCULAR; INTRAVENOUS at 17:04

## 2017-10-11 RX ADMIN — SODIUM CHLORIDE 1000 ML: 900 INJECTION, SOLUTION INTRAVENOUS at 17:44

## 2017-10-11 RX ADMIN — AZITHROMYCIN 1000 MG: 250 TABLET, FILM COATED ORAL at 19:20

## 2017-10-11 RX ADMIN — ACETAMINOPHEN 1000 MG: 500 TABLET ORAL at 17:04

## 2017-10-11 RX ADMIN — CEFTRIAXONE 1 G: 1 INJECTION, POWDER, FOR SOLUTION INTRAMUSCULAR; INTRAVENOUS at 18:33

## 2017-10-11 NOTE — ED NOTES
Discharge instructions were given to the patient by Rosa Fried RN. The patient left the Emergency Department via his wheelchair, alert and oriented and in no acute distress with 1 prescription. The patient was encouraged to call or return to the ED for worsening issues or problems and was encouraged to schedule a follow up appointment for continuing care. The patient verbalized understanding of discharge instructions and prescriptions, all questions were answered. The patient has no further concerns at this time.
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
## 2017-10-11 NOTE — ED NOTES
Bedside and Verbal shift change report given to Micheline RN (oncoming nurse) by Bill Hernandez RN (offgoing nurse). Report included the following information SBAR and Kardex.

## 2017-10-11 NOTE — DISCHARGE INSTRUCTIONS

## 2017-10-11 NOTE — ED NOTES
Bedside and Verbal shift change report given to Karen Champion RN (oncoming nurse) by Tracey Hillman RN (offgoing nurse). Report included the following information SBAR, ED Summary, MAR and Recent Results. Assumed pt care at this time.

## 2017-10-13 ENCOUNTER — HOSPITAL ENCOUNTER (OUTPATIENT)
Age: 22
Setting detail: OBSERVATION
Discharge: LEFT BEFORE DISCHARGE | DRG: 463 | End: 2017-10-14
Attending: EMERGENCY MEDICINE | Admitting: INTERNAL MEDICINE
Payer: MEDICAID

## 2017-10-13 DIAGNOSIS — N39.0 URINARY TRACT INFECTION ASSOCIATED WITH CATHETERIZATION OF URINARY TRACT, UNSPECIFIED INDWELLING URINARY CATHETER TYPE, SUBSEQUENT ENCOUNTER: ICD-10-CM

## 2017-10-13 DIAGNOSIS — T83.511D URINARY TRACT INFECTION ASSOCIATED WITH CATHETERIZATION OF URINARY TRACT, UNSPECIFIED INDWELLING URINARY CATHETER TYPE, SUBSEQUENT ENCOUNTER: ICD-10-CM

## 2017-10-13 DIAGNOSIS — R78.81 BACTEREMIA: Primary | ICD-10-CM

## 2017-10-13 DIAGNOSIS — A40.9 STREPTOCOCCAL SEPSIS (HCC): ICD-10-CM

## 2017-10-13 LAB
ALBUMIN SERPL-MCNC: 2.4 G/DL (ref 3.5–5)
ALBUMIN/GLOB SERPL: 0.4 {RATIO} (ref 1.1–2.2)
ALP SERPL-CCNC: 99 U/L (ref 45–117)
ALT SERPL-CCNC: 35 U/L (ref 12–78)
AMORPH CRY URNS QL MICRO: ABNORMAL
ANION GAP SERPL CALC-SCNC: 11 MMOL/L (ref 5–15)
APPEARANCE UR: ABNORMAL
AST SERPL-CCNC: 32 U/L (ref 15–37)
ATRIAL RATE: 141 BPM
BACTERIA SPEC CULT: ABNORMAL
BACTERIA URNS QL MICRO: NEGATIVE /HPF
BASOPHILS # BLD: 0 K/UL (ref 0–0.1)
BASOPHILS NFR BLD: 0 % (ref 0–1)
BILIRUB SERPL-MCNC: 0.5 MG/DL (ref 0.2–1)
BILIRUB UR QL CFM: NEGATIVE
BUN SERPL-MCNC: 14 MG/DL (ref 6–20)
BUN/CREAT SERPL: 23 (ref 12–20)
C TRACH DNA SPEC QL NAA+PROBE: NEGATIVE
CALCIUM SERPL-MCNC: 8.9 MG/DL (ref 8.5–10.1)
CALCULATED P AXIS, ECG09: 61 DEGREES
CALCULATED R AXIS, ECG10: 70 DEGREES
CALCULATED T AXIS, ECG11: 82 DEGREES
CC UR VC: ABNORMAL
CHLORIDE SERPL-SCNC: 98 MMOL/L (ref 97–108)
CO2 SERPL-SCNC: 25 MMOL/L (ref 21–32)
COLOR UR: ABNORMAL
CREAT SERPL-MCNC: 0.61 MG/DL (ref 0.7–1.3)
DIAGNOSIS, 93000: NORMAL
EOSINOPHIL # BLD: 0.1 K/UL (ref 0–0.4)
EOSINOPHIL NFR BLD: 1 % (ref 0–7)
EPITH CASTS URNS QL MICRO: ABNORMAL /LPF
ERYTHROCYTE [DISTWIDTH] IN BLOOD BY AUTOMATED COUNT: 12.7 % (ref 11.5–14.5)
GLOBULIN SER CALC-MCNC: 5.5 G/DL (ref 2–4)
GLUCOSE SERPL-MCNC: 97 MG/DL (ref 65–100)
GLUCOSE UR STRIP.AUTO-MCNC: NEGATIVE MG/DL
HCT VFR BLD AUTO: 34.1 % (ref 36.6–50.3)
HGB BLD-MCNC: 11.4 G/DL (ref 12.1–17)
HGB UR QL STRIP: NEGATIVE
KETONES UR QL STRIP.AUTO: ABNORMAL MG/DL
LACTATE SERPL-SCNC: 1.2 MMOL/L (ref 0.4–2)
LEUKOCYTE ESTERASE UR QL STRIP.AUTO: ABNORMAL
LYMPHOCYTES # BLD: 1.5 K/UL (ref 0.8–3.5)
LYMPHOCYTES NFR BLD: 28 % (ref 12–49)
MCH RBC QN AUTO: 29.9 PG (ref 26–34)
MCHC RBC AUTO-ENTMCNC: 33.4 G/DL (ref 30–36.5)
MCV RBC AUTO: 89.5 FL (ref 80–99)
MONOCYTES # BLD: 0.8 K/UL (ref 0–1)
MONOCYTES NFR BLD: 14 % (ref 5–13)
N GONORRHOEA DNA SPEC QL NAA+PROBE: NEGATIVE
NEUTS SEG # BLD: 3 K/UL (ref 1.8–8)
NEUTS SEG NFR BLD: 57 % (ref 32–75)
NITRITE UR QL STRIP.AUTO: NEGATIVE
P-R INTERVAL, ECG05: 120 MS
PH UR STRIP: 6.5 [PH] (ref 5–8)
PLATELET # BLD AUTO: 373 K/UL (ref 150–400)
POTASSIUM SERPL-SCNC: 3.5 MMOL/L (ref 3.5–5.1)
PROT SERPL-MCNC: 7.9 G/DL (ref 6.4–8.2)
PROT UR STRIP-MCNC: ABNORMAL MG/DL
Q-T INTERVAL, ECG07: 272 MS
QRS DURATION, ECG06: 100 MS
QTC CALCULATION (BEZET), ECG08: 416 MS
RBC # BLD AUTO: 3.81 M/UL (ref 4.1–5.7)
RBC #/AREA URNS HPF: ABNORMAL /HPF (ref 0–5)
SAMPLE TYPE: NORMAL
SERVICE CMNT-IMP: ABNORMAL
SERVICE CMNT-IMP: NORMAL
SODIUM SERPL-SCNC: 134 MMOL/L (ref 136–145)
SP GR UR REFRACTOMETRY: 1.01 (ref 1–1.03)
SPECIMEN SOURCE: NORMAL
UA: UC IF INDICATED,UAUC: ABNORMAL
UROBILINOGEN UR QL STRIP.AUTO: >8 EU/DL (ref 0.2–1)
VENTRICULAR RATE, ECG03: 141 BPM
WBC # BLD AUTO: 5.3 K/UL (ref 4.1–11.1)
WBC URNS QL MICRO: ABNORMAL /HPF (ref 0–4)

## 2017-10-13 PROCEDURE — 74011000258 HC RX REV CODE- 258: Performed by: NURSE PRACTITIONER

## 2017-10-13 PROCEDURE — 65270000032 HC RM SEMIPRIVATE

## 2017-10-13 PROCEDURE — 87040 BLOOD CULTURE FOR BACTERIA: CPT | Performed by: NURSE PRACTITIONER

## 2017-10-13 PROCEDURE — 74011250637 HC RX REV CODE- 250/637: Performed by: EMERGENCY MEDICINE

## 2017-10-13 PROCEDURE — 85025 COMPLETE CBC W/AUTO DIFF WBC: CPT | Performed by: NURSE PRACTITIONER

## 2017-10-13 PROCEDURE — 81001 URINALYSIS AUTO W/SCOPE: CPT | Performed by: NURSE PRACTITIONER

## 2017-10-13 PROCEDURE — 80053 COMPREHEN METABOLIC PANEL: CPT | Performed by: NURSE PRACTITIONER

## 2017-10-13 PROCEDURE — 36415 COLL VENOUS BLD VENIPUNCTURE: CPT | Performed by: NURSE PRACTITIONER

## 2017-10-13 PROCEDURE — 96372 THER/PROPH/DIAG INJ SC/IM: CPT

## 2017-10-13 PROCEDURE — 83605 ASSAY OF LACTIC ACID: CPT | Performed by: NURSE PRACTITIONER

## 2017-10-13 PROCEDURE — 74011250637 HC RX REV CODE- 250/637: Performed by: INTERNAL MEDICINE

## 2017-10-13 PROCEDURE — 96365 THER/PROPH/DIAG IV INF INIT: CPT

## 2017-10-13 PROCEDURE — 74011250636 HC RX REV CODE- 250/636: Performed by: NURSE PRACTITIONER

## 2017-10-13 PROCEDURE — 74011250636 HC RX REV CODE- 250/636: Performed by: INTERNAL MEDICINE

## 2017-10-13 PROCEDURE — 99218 HC RM OBSERVATION: CPT

## 2017-10-13 PROCEDURE — 77030011256 HC DRSG MEPILEX <16IN NO BORD MOLN -A

## 2017-10-13 PROCEDURE — 77030027688 HC DRSG MEPILEX 16-48IN NO BORD MOLN -A

## 2017-10-13 PROCEDURE — 99284 EMERGENCY DEPT VISIT MOD MDM: CPT

## 2017-10-13 RX ORDER — SODIUM CHLORIDE 0.9 % (FLUSH) 0.9 %
5-10 SYRINGE (ML) INJECTION EVERY 8 HOURS
Status: DISCONTINUED | OUTPATIENT
Start: 2017-10-13 | End: 2017-10-14 | Stop reason: HOSPADM

## 2017-10-13 RX ORDER — SODIUM CHLORIDE 9 MG/ML
150 INJECTION, SOLUTION INTRAVENOUS CONTINUOUS
Status: DISCONTINUED | OUTPATIENT
Start: 2017-10-13 | End: 2017-10-14 | Stop reason: HOSPADM

## 2017-10-13 RX ORDER — NORTRIPTYLINE HYDROCHLORIDE 25 MG/1
100 CAPSULE ORAL
Status: DISCONTINUED | OUTPATIENT
Start: 2017-10-13 | End: 2017-10-13

## 2017-10-13 RX ORDER — ENOXAPARIN SODIUM 100 MG/ML
40 INJECTION SUBCUTANEOUS EVERY 24 HOURS
Status: DISCONTINUED | OUTPATIENT
Start: 2017-10-13 | End: 2017-10-14

## 2017-10-13 RX ORDER — NORTRIPTYLINE HYDROCHLORIDE 25 MG/1
100 CAPSULE ORAL
COMMUNITY
End: 2018-02-12

## 2017-10-13 RX ORDER — GABAPENTIN 300 MG/1
1200 CAPSULE ORAL 3 TIMES DAILY
Status: DISCONTINUED | OUTPATIENT
Start: 2017-10-14 | End: 2017-10-14 | Stop reason: HOSPADM

## 2017-10-13 RX ORDER — TRAZODONE HYDROCHLORIDE 50 MG/1
50 TABLET ORAL
Status: DISCONTINUED | OUTPATIENT
Start: 2017-10-13 | End: 2017-10-14 | Stop reason: HOSPADM

## 2017-10-13 RX ORDER — SODIUM CHLORIDE 0.9 % (FLUSH) 0.9 %
5-10 SYRINGE (ML) INJECTION AS NEEDED
Status: DISCONTINUED | OUTPATIENT
Start: 2017-10-13 | End: 2017-10-14 | Stop reason: HOSPADM

## 2017-10-13 RX ORDER — METHOCARBAMOL 500 MG/1
1000 TABLET, FILM COATED ORAL 3 TIMES DAILY
Status: DISCONTINUED | OUTPATIENT
Start: 2017-10-14 | End: 2017-10-14 | Stop reason: HOSPADM

## 2017-10-13 RX ORDER — GABAPENTIN 600 MG/1
1200 TABLET ORAL 3 TIMES DAILY
COMMUNITY
End: 2019-02-13

## 2017-10-13 RX ORDER — METHOCARBAMOL 500 MG/1
1000 TABLET, FILM COATED ORAL
COMMUNITY
End: 2018-02-12

## 2017-10-13 RX ORDER — IBUPROFEN 600 MG/1
600 TABLET ORAL
Status: DISCONTINUED | OUTPATIENT
Start: 2017-10-13 | End: 2017-10-14 | Stop reason: HOSPADM

## 2017-10-13 RX ADMIN — TRAZODONE HYDROCHLORIDE 50 MG: 50 TABLET ORAL at 23:27

## 2017-10-13 RX ADMIN — SODIUM CHLORIDE 1000 ML: 900 INJECTION, SOLUTION INTRAVENOUS at 18:23

## 2017-10-13 RX ADMIN — CEFTRIAXONE SODIUM 1 G: 1 INJECTION, POWDER, FOR SOLUTION INTRAMUSCULAR; INTRAVENOUS at 16:36

## 2017-10-13 RX ADMIN — IBUPROFEN 600 MG: 600 TABLET, FILM COATED ORAL at 21:34

## 2017-10-13 RX ADMIN — SODIUM CHLORIDE 150 ML/HR: 900 INJECTION, SOLUTION INTRAVENOUS at 19:44

## 2017-10-13 RX ADMIN — ENOXAPARIN SODIUM 40 MG: 100 INJECTION SUBCUTANEOUS at 20:58

## 2017-10-13 NOTE — PROGRESS NOTES
Pt admitted remotely by myself, following discussion with ED physician, and review of labwork, radiology, old notes and patient care record. Care on [place] followed via [address] Record remotely , as well as with discussion with nursing staff on [place].   A/p   GM NEG BACTEREMIA  UTI    Ceftriaxone  UCX,BCX  IVF    Full hmp to follow

## 2017-10-13 NOTE — ROUTINE PROCESS
TRANSFER - OUT REPORT:    TELEPHONE Verbal report given to REJI MARQUEZ(name) on [facility]  being transferred to [facility] ([facility]) for routine progression of care       Report consisted of patients Situation, Background, Assessment and   Recommendations(SBAR). Information from the following report(s) SBAR, Kardex, ED Summary, Procedure Summary, Intake/Output, MAR, Recent Results and Cardiac Rhythm SINUS TACH was reviewed with the receiving nurse. Lines:   Peripheral IV 10/13/17 Left Forearm (Active)   Site Assessment Clean, dry, & intact 10/13/2017  4:44 PM   Phlebitis Assessment 0 10/13/2017  4:44 PM   Infiltration Assessment 0 10/13/2017  4:44 PM   Dressing Status Clean, dry, & intact 10/13/2017  4:44 PM   Dressing Type Transparent 10/13/2017  4:44 PM   Hub Color/Line Status Pink;Flushed;Patent 10/13/2017  4:44 PM   Action Taken Blood drawn 10/13/2017  4:44 PM        Opportunity for questions and clarification was provided.       Patient transported with:   Monitor

## 2017-10-13 NOTE — ED NOTES
Patient here today because states he was asked to return. Patient was here two days ago with urinary tract infection. Patient reports being prescribed antibiotics however states that he was not able to find transportation to get to the pharmacy to start those discharge prescriptions. Patient reports fevers prior to previous visit but denies fevers since then. Patient states \"I've been feeling fine. \"  Patient wheelchair bound. Emergency Department Nursing Plan of Care       The Nursing Plan of Care is developed from the Nursing assessment and Emergency Department Attending provider initial evaluation. The plan of care may be reviewed in the ED Provider note.     The Plan of Care was developed with the following considerations:   Patient / Family readiness to learn indicated by:verbalized understanding  Persons(s) to be included in education: patient  Barriers to Learning/Limitations:No    Signed     Chu Wallace RN    10/13/2017   4:18 PM

## 2017-10-13 NOTE — ED PROVIDER NOTES
HPI Comments: Pablo Villanueva is a 25 y.o. male presents to [facility] ED was evaluated 2 days ago in this ED. Patient called at home  today to return to ED for admission to hospital. Blood cultures oct 11 ( + strep A and gram negative rods.) past HX significant for paraplegia patient self caths   Patient specifically denies fever, chills, nausea, vomitng, chest pain, shortness of breath, headache, rash, diarrhea ,sweating or weight loss. Patient is a 25 y.o. male presenting with urinary tract infection. Bladder Infection    This is a new problem. The current episode started 2 days ago. The problem has not changed since onset. Quality: no pain. The pain is at a severity of 0/10. The patient is experiencing no pain. There has been no fever. There is no history of pyelonephritis. Pertinent negatives include no chills and no sweats. He has tried nothing for the symptoms. His past medical history is significant for recurrent UTIs. History reviewed. No pertinent past medical history. History reviewed. No pertinent surgical history. History reviewed. No pertinent family history. Social History     Social History    Marital status: SINGLE     Spouse name: N/A    Number of children: N/A    Years of education: N/A     Occupational History    Not on file. Social History Main Topics    Smoking status: Current Every Day Smoker    Smokeless tobacco: Never Used    Alcohol use No    Drug use: Yes     Special: Marijuana    Sexual activity: Yes     Partners: Male     Birth control/ protection: Condom     Other Topics Concern    Not on file     Social History Narrative         ALLERGIES: Review of patient's allergies indicates no known allergies. Review of Systems   Constitutional: Negative for chills, fatigue and fever. HENT: Negative for congestion and sore throat. Eyes: Negative for redness. Respiratory: Negative for cough, chest tightness and wheezing.     Cardiovascular: Negative for chest pain. Gastrointestinal: Negative for abdominal pain. Genitourinary: Negative for dysuria. Musculoskeletal: Negative for neck pain and neck stiffness. Chronic bilateral extremity pain   Skin: Negative for rash. Neurological: Negative for dizziness, syncope, weakness, light-headedness, numbness and headaches. Hematological: Negative for adenopathy. Psychiatric/Behavioral: Negative for agitation and behavioral problems. All other systems reviewed and are negative. Vitals:    10/13/17 1605   BP: 125/72   Pulse: (!) 131   Resp: 22   Temp: 99.6 °F (37.6 °C)   SpO2: 96%   Weight: 61.2 kg (135 lb)   Height: 6' (1.829 m)            Physical Exam   Constitutional: He is oriented to person, place, and time. He appears well-developed and well-nourished. HENT:   Head: Normocephalic and atraumatic. Right Ear: External ear normal.   Left Ear: External ear normal.   Mouth/Throat: Oropharynx is clear and moist.   Eyes: Conjunctivae are normal. Right eye exhibits no discharge. Left eye exhibits no discharge. Neck: Normal range of motion. Neck supple. Cardiovascular: Normal rate, regular rhythm and normal heart sounds. Pulmonary/Chest: Effort normal and breath sounds normal. No respiratory distress. He has no wheezes. Abdominal: Soft. Bowel sounds are normal. There is no tenderness. Musculoskeletal: He exhibits no edema. LE atrophy and immobility   Lymphadenopathy:     He has no cervical adenopathy. Neurological: He is alert and oriented to person, place, and time. No cranial nerve deficit. Skin: Skin is warm and dry. Psychiatric: He has a normal mood and affect. His behavior is normal. Judgment and thought content normal.   Nursing note and vitals reviewed.        MDM  Number of Diagnoses or Management Options  Bacteremia:   Streptococcal sepsis [facility]):   Urinary tract infection associated with catheterization of urinary tract, unspecified indwelling urinary catheter type, subsequent encounter:   Diagnosis management comments: DDX sepsis UTI bacteremia       Amount and/or Complexity of Data Reviewed  Clinical lab tests: ordered and reviewed  Review and summarize past medical records: yes  Discuss the patient with other providers: yes    Patient Progress  Patient progress: stable    ED Course       Procedures  CONSULT NOTE:     I  spoke with Dr Jaswant Santiago hospitalist   Discussed pt's hx, disposition, and available diagnostic and imaging results. Reviewed care plans. Consultant agrees with plans as outlined. Brian Seymour NP    Patient is being admitted to the hospital.  The results of their tests and reasons for their admission have been discussed with them and/or available family. They convey agreement and understanding for the need to be admitted and for their admission diagnosis. Consultation has been made with the inpatient physician specialist for hospitalization. LABORATORY TESTS:  Recent Results (from the past 12 hour(s))   CBC WITH AUTOMATED DIFF    Collection Time: 10/13/17  4:21 PM   Result Value Ref Range    WBC 5.3 4.1 - 11.1 K/uL    RBC 3.81 (L) 4.10 - 5.70 M/uL    HGB 11.4 (L) 12.1 - 17.0 g/dL    HCT 34.1 (L) 36.6 - 50.3 %    MCV 89.5 80.0 - 99.0 FL    MCH 29.9 26.0 - 34.0 PG    MCHC 33.4 30.0 - 36.5 g/dL    RDW 12.7 11.5 - 14.5 %    PLATELET 053 875 - 833 K/uL    NEUTROPHILS 57 32 - 75 %    LYMPHOCYTES 28 12 - 49 %    MONOCYTES 14 (H) 5 - 13 %    EOSINOPHILS 1 0 - 7 %    BASOPHILS 0 0 - 1 %    ABS. NEUTROPHILS 3.0 1.8 - 8.0 K/UL    ABS. LYMPHOCYTES 1.5 0.8 - 3.5 K/UL    ABS. MONOCYTES 0.8 0.0 - 1.0 K/UL    ABS. EOSINOPHILS 0.1 0.0 - 0.4 K/UL    ABS.  BASOPHILS 0.0 0.0 - 0.1 K/UL   METABOLIC PANEL, COMPREHENSIVE    Collection Time: 10/13/17  4:21 PM   Result Value Ref Range    Sodium 134 (L) 136 - 145 mmol/L    Potassium 3.5 3.5 - 5.1 mmol/L    Chloride 98 97 - 108 mmol/L    CO2 25 21 - 32 mmol/L    Anion gap 11 5 - 15 mmol/L    Glucose 97 65 - 100 mg/dL    BUN 14 6 - 20 MG/DL    Creatinine 0.61 (L) 0.70 - 1.30 MG/DL    BUN/Creatinine ratio 23 (H) 12 - 20      GFR est AA >60 >60 ml/min/1.73m2    GFR est non-AA >60 >60 ml/min/1.73m2    Calcium 8.9 8.5 - 10.1 MG/DL    Bilirubin, total 0.5 0.2 - 1.0 MG/DL    ALT (SGPT) 35 12 - 78 U/L    AST (SGOT) 32 15 - 37 U/L    Alk. phosphatase 99 45 - 117 U/L    Protein, total 7.9 6.4 - 8.2 g/dL    Albumin 2.4 (L) 3.5 - 5.0 g/dL    Globulin 5.5 (H) 2.0 - 4.0 g/dL    A-G Ratio 0.4 (L) 1.1 - 2.2     LACTIC ACID    Collection Time: 10/13/17  4:21 PM   Result Value Ref Range    Lactic acid 1.2 0.4 - 2.0 MMOL/L   URINALYSIS W/ REFLEX CULTURE    Collection Time: 10/13/17  5:30 PM   Result Value Ref Range    Color SHEY      Appearance CLOUDY (A) CLEAR      Specific gravity 1.015 1.003 - 1.030      pH (UA) 6.5 5.0 - 8.0      Protein TRACE (A) NEG mg/dL    Glucose NEGATIVE  NEG mg/dL    Ketone TRACE (A) NEG mg/dL    Blood NEGATIVE  NEG      Urobilinogen >8.0 (H) 0.2 - 1.0 EU/dL    Nitrites NEGATIVE  NEG      Leukocyte Esterase SMALL (A) NEG      WBC 0-4 0 - 4 /hpf    RBC 0-5 0 - 5 /hpf    Epithelial cells FEW FEW /lpf    Bacteria NEGATIVE  NEG /hpf    UA:UC IF INDICATED CULTURE NOT INDICATED BY UA RESULT CNI      Amorphous Crystals 4+ (A) NEG   BILIRUBIN, CONFIRM    Collection Time: 10/13/17  5:30 PM   Result Value Ref Range    Bilirubin UA, confirm NEGATIVE  NEG         IMAGING RESULTS:  No orders to display     No results found. MEDICATIONS GIVEN:  Medications   ibuprofen (MOTRIN) tablet 600 mg (not administered)   cefTRIAXone (ROCEPHIN) 2 g in 0.9% sodium chloride (MBP/ADV) 50 mL (not administered)   cefTRIAXone (ROCEPHIN) 1 g in 0.9% sodium chloride (MBP/ADV) 50 mL (0 g IntraVENous IV Completed 10/13/17 5676)   sodium chloride 0.9 % bolus infusion 1,000 mL (1,000 mL IntraVENous New Bag 10/13/17 4973)       IMPRESSION:  1. Bacteremia    2.  Urinary tract infection associated with catheterization of urinary tract, unspecified indwelling urinary catheter type, subsequent encounter    3. Streptococcal sepsis ([facility] [address]        PLAN:  1.  Admit to Dr Magda Leon

## 2017-10-13 NOTE — ROUTINE PROCESS
.. TRANSFER - IN REPORT:    Verbal report received from ALMA MUÑIZ (name) on Jorge Cisse  being received from ED (unit) for routine progression of care      Report consisted of patients Situation, Background, Assessment and   Recommendations(SBAR). Information from the following report(s) SBAR, Kardex, MAR, Accordion and Recent Results was reviewed with the receiving nurse. Opportunity for questions and clarification was provided. Assessment completed upon patients arrival to unit and care assumed.

## 2017-10-14 VITALS
SYSTOLIC BLOOD PRESSURE: 116 MMHG | BODY MASS INDEX: 15.31 KG/M2 | WEIGHT: 113 LBS | DIASTOLIC BLOOD PRESSURE: 70 MMHG | HEIGHT: 72 IN | RESPIRATION RATE: 18 BRPM | HEART RATE: 97 BPM | TEMPERATURE: 96.8 F | OXYGEN SATURATION: 100 %

## 2017-10-14 LAB
ANION GAP SERPL CALC-SCNC: 8 MMOL/L (ref 5–15)
BUN SERPL-MCNC: 10 MG/DL (ref 6–20)
BUN/CREAT SERPL: 20 (ref 12–20)
CALCIUM SERPL-MCNC: 8.3 MG/DL (ref 8.5–10.1)
CHLORIDE SERPL-SCNC: 105 MMOL/L (ref 97–108)
CO2 SERPL-SCNC: 26 MMOL/L (ref 21–32)
CREAT SERPL-MCNC: 0.49 MG/DL (ref 0.7–1.3)
ERYTHROCYTE [DISTWIDTH] IN BLOOD BY AUTOMATED COUNT: 12.7 % (ref 11.5–14.5)
GLUCOSE SERPL-MCNC: 100 MG/DL (ref 65–100)
HCT VFR BLD AUTO: 31.7 % (ref 36.6–50.3)
HGB BLD-MCNC: 10.3 G/DL (ref 12.1–17)
MCH RBC QN AUTO: 29.4 PG (ref 26–34)
MCHC RBC AUTO-ENTMCNC: 32.5 G/DL (ref 30–36.5)
MCV RBC AUTO: 90.6 FL (ref 80–99)
PLATELET # BLD AUTO: 325 K/UL (ref 150–400)
POTASSIUM SERPL-SCNC: 3.1 MMOL/L (ref 3.5–5.1)
RBC # BLD AUTO: 3.5 M/UL (ref 4.1–5.7)
SODIUM SERPL-SCNC: 139 MMOL/L (ref 136–145)
WBC # BLD AUTO: 5.1 K/UL (ref 4.1–11.1)

## 2017-10-14 PROCEDURE — 87086 URINE CULTURE/COLONY COUNT: CPT | Performed by: INTERNAL MEDICINE

## 2017-10-14 PROCEDURE — 99218 HC RM OBSERVATION: CPT

## 2017-10-14 PROCEDURE — 36415 COLL VENOUS BLD VENIPUNCTURE: CPT | Performed by: INTERNAL MEDICINE

## 2017-10-14 PROCEDURE — 74011250637 HC RX REV CODE- 250/637: Performed by: EMERGENCY MEDICINE

## 2017-10-14 PROCEDURE — 85027 COMPLETE CBC AUTOMATED: CPT | Performed by: INTERNAL MEDICINE

## 2017-10-14 PROCEDURE — 74011250636 HC RX REV CODE- 250/636: Performed by: INTERNAL MEDICINE

## 2017-10-14 PROCEDURE — 80048 BASIC METABOLIC PNL TOTAL CA: CPT | Performed by: INTERNAL MEDICINE

## 2017-10-14 RX ORDER — POTASSIUM CHLORIDE 750 MG/1
40 TABLET, FILM COATED, EXTENDED RELEASE ORAL
Status: COMPLETED | OUTPATIENT
Start: 2017-10-14 | End: 2017-10-14

## 2017-10-14 RX ORDER — ENOXAPARIN SODIUM 100 MG/ML
30 INJECTION SUBCUTANEOUS EVERY 24 HOURS
Status: DISCONTINUED | OUTPATIENT
Start: 2017-10-14 | End: 2017-10-14 | Stop reason: HOSPADM

## 2017-10-14 RX ADMIN — POTASSIUM CHLORIDE 40 MEQ: 750 TABLET, FILM COATED, EXTENDED RELEASE ORAL at 05:09

## 2017-10-14 RX ADMIN — SODIUM CHLORIDE 150 ML/HR: 900 INJECTION, SOLUTION INTRAVENOUS at 05:09

## 2017-10-14 RX ADMIN — METHOCARBAMOL 1000 MG: 500 TABLET ORAL at 08:34

## 2017-10-14 RX ADMIN — GABAPENTIN 1200 MG: 300 CAPSULE ORAL at 08:34

## 2017-10-14 NOTE — PROGRESS NOTES
[facility] PHARMACY DAILY ANTICOAGULANT ASSESSMENT    Estimated body mass index is 15.33 kg/(m^2) as calculated from the following:    Height as of this encounter: 182.9 cm (72\"). Weight as of this encounter: 51.3 kg (113 lb). Estimated Creatinine Clearance: 173 mL/min (based on Cr of 0.49). Lab Results   Component Value Date/Time    Creatinine (POC) 1.0 08/04/2014 03:52 PM    Creatinine 0.49 10/14/2017 12:17 AM     Lab Results   Component Value Date/Time    Hemoglobin (POC) 15.3 08/04/2014 03:52 PM    HGB 10.3 10/14/2017 12:17 AM     Lab Results   Component Value Date/Time    PLATELET 975 08/93/0646 12:17 AM     Assessment: hgb, plt, & SCr decreased; adjusted dose from 40 mg SQ daily to 30 mg SQ daily for CrCl >30 mL/min, BMI <40, & weight in a man of <57 kg.     Thank you,    Elyse Leong, PHARMD, BCPS  Contact: [phone]

## 2017-10-14 NOTE — PROGRESS NOTES
Patient arrived on unit at shift change, alert and orientated, able to transfer and turn from side to side. Patient stated he is independent with catheterization and BM; straight cath kit at bedside. Patients dressing fell off of sacrum pressure ulcer, dressing reapplied. Patient complained that he has trouble sleeping, requesting sleeping medication. Notified Serafin Gamez that patients home medication for pain and sleep are not in the [facility] and patient stating he doesn't sleep with the home medications, trazodone ordered. Bedside and Verbal shift change report given to Jeanette Meyers [phone] (oncoming nurse) by Ck Akins (offgoing nurse). Report included the following information SBAR, ED Summary, Intake/Output, MAR, Recent Results and Cardiac Rhythm Sinus Tach to NSR.

## 2017-10-14 NOTE — PROGRESS NOTES
Problem: Falls - Risk of  Goal: *Absence of Falls  Document Ezekiel Fall Risk and appropriate interventions in the flowsheet. Outcome: Progressing Towards Goal  Fall Risk Interventions:          Hourly rounding done. Fall precautions in place. Bed in lowest position, wheels locked. [place] footwear on pt. Fall risk band on pt wrist.  Call light within pt reach. Personal items within reach.                              Problem: Urinary Tract Infection - Adult  Goal: *Absence of infection signs and symptoms  Outcome: Progressing Towards Goal  Pt being treated appropriately for UTI
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
## 2017-10-14 NOTE — DISCHARGE SUMMARY
Same day admit and discharge. Please see H&P for details. PATIENT SIGNED AMA  Pt insisted on going home today, was advised of the need to stay in hospital for iv abx, and that the risks of declining treatment included septic shock and even death. Pt expressed understanding and insisted on leaving.

## 2017-10-14 NOTE — PROGRESS NOTES
[phone]) Pt arrive to unit  1930) Bedside shift change report given to Mone Johnson RN (oncoming nurse) by Jeff Oakley RN (offgoing nurse).  Report included the following information SBAR, Kardex, MAR, Accordion, Recent Results and Cardiac Rhythm ST.

## 2017-10-14 NOTE — PROGRESS NOTES
Pt left AMA. pt understanding risk of leaving AMA including sepsis and death. iv line removed. pt has all his belonging with him. pt left building with his girlfriend.

## 2017-10-14 NOTE — PROGRESS NOTES
[facility] Admission Pharmacy Medication Reconciliation     Recommendations:          -Review updated PTA list for appropriate meds to resume during this admission    Findings:   1) Added to PTA list:           -Gabapentin 600 mg tabs - Take 2 tabs (1200 mg) po q8h (last dose today)          -Methocarbamol 500 mg tabs - Take 2 tabs QID prn muscle spasms (last dose today)          -Nortriptyline 25 mg caps - Take 4 caps po qhs (last dose last week; pt states this med \"doesn't work\")  2) Discharged from [facility] ED on 10/11/17 with Ciprofloxacin 500 mg po BID x 7 days, but patient never started this due to transportation to pharmacy issues. [Urine culture C&S result today shows resistance to Ciprofloxacin]      Information obtained from: Patient interview, RX Query    Past Medical History/Disease States:  History reviewed. No pertinent past medical history. Patient allergies: Allergies as of 10/13/2017    (No Known Allergies)         Prior to Admission Medications   Prescriptions Last Dose Informant Patient Reported? Taking?   gabapentin (NEURONTIN) 600 mg tablet 10/13/2017  Yes Yes   Sig: Take 1,200 mg by mouth three (3) times daily. methocarbamol (ROBAXIN) 500 mg tablet 10/13/2017  Yes Yes   Sig: Take 1,000 mg by mouth four (4) times daily as needed. nortriptyline (PAMELOR) 25 mg capsule 10/6/2017  Yes Yes   Sig: Take 100 mg by mouth nightly.       Facility-Administered Medications: None            Thank you,  Osman Lennon, [facility]

## 2017-10-15 LAB
BACTERIA SPEC CULT: NORMAL
CC UR VC: NORMAL
SERVICE CMNT-IMP: NORMAL

## 2017-10-16 ENCOUNTER — TELEPHONE (OUTPATIENT)
Dept: CASE MANAGEMENT | Age: 22
End: 2017-10-16

## 2017-10-16 NOTE — TELEPHONE ENCOUNTER
CM called patient for the purpose of follow up to his inpatient admission from 10/13/17 to 10/14/17. Patient left AMA.   He informed CM on the call today that he is currently an inpatient at [address].

## 2017-10-17 LAB
BACTERIA SPEC CULT: ABNORMAL
SERVICE CMNT-IMP: ABNORMAL
SERVICE CMNT-IMP: ABNORMAL

## 2017-10-18 LAB
BACTERIA SPEC CULT: NORMAL
BACTERIA SPEC CULT: NORMAL
SERVICE CMNT-IMP: NORMAL
SERVICE CMNT-IMP: NORMAL

## 2018-02-12 ENCOUNTER — HOSPITAL ENCOUNTER (EMERGENCY)
Age: 23
Discharge: HOME | End: 2018-02-12
Attending: EMERGENCY MEDICINE | Admitting: EMERGENCY MEDICINE
Payer: MEDICAID

## 2018-02-12 VITALS
DIASTOLIC BLOOD PRESSURE: 85 MMHG | HEART RATE: 86 BPM | HEIGHT: 72 IN | WEIGHT: 120 LBS | BODY MASS INDEX: 16.25 KG/M2 | RESPIRATION RATE: 16 BRPM | OXYGEN SATURATION: 100 % | TEMPERATURE: 98.1 F | SYSTOLIC BLOOD PRESSURE: 134 MMHG

## 2018-02-12 DIAGNOSIS — N30.00 ACUTE CYSTITIS WITHOUT HEMATURIA: Primary | ICD-10-CM

## 2018-02-12 DIAGNOSIS — R11.2 NON-INTRACTABLE VOMITING WITH NAUSEA, UNSPECIFIED VOMITING TYPE: ICD-10-CM

## 2018-02-12 LAB
ALBUMIN SERPL-MCNC: 2.9 G/DL (ref 3.5–5)
ALBUMIN/GLOB SERPL: 0.5 {RATIO} (ref 1.1–2.2)
ALP SERPL-CCNC: 81 U/L (ref 45–117)
ALT SERPL-CCNC: 14 U/L (ref 12–78)
ANION GAP SERPL CALC-SCNC: 13 MMOL/L (ref 5–15)
APPEARANCE UR: ABNORMAL
AST SERPL-CCNC: 16 U/L (ref 15–37)
BACTERIA URNS QL MICRO: ABNORMAL /HPF
BASOPHILS # BLD: 0 K/UL (ref 0–0.1)
BASOPHILS NFR BLD: 0 % (ref 0–1)
BILIRUB SERPL-MCNC: 0.6 MG/DL (ref 0.2–1)
BILIRUB UR QL CFM: NEGATIVE
BUN SERPL-MCNC: 13 MG/DL (ref 6–20)
BUN/CREAT SERPL: 24 (ref 12–20)
CALCIUM SERPL-MCNC: 9.8 MG/DL (ref 8.5–10.1)
CHLORIDE SERPL-SCNC: 95 MMOL/L (ref 97–108)
CO2 SERPL-SCNC: 28 MMOL/L (ref 21–32)
COLOR UR: ABNORMAL
CREAT SERPL-MCNC: 0.55 MG/DL (ref 0.7–1.3)
DIFFERENTIAL METHOD BLD: ABNORMAL
EOSINOPHIL # BLD: 0 K/UL (ref 0–0.4)
EOSINOPHIL NFR BLD: 0 % (ref 0–7)
EPITH CASTS URNS QL MICRO: ABNORMAL /LPF
ERYTHROCYTE [DISTWIDTH] IN BLOOD BY AUTOMATED COUNT: 13.6 % (ref 11.5–14.5)
GLOBULIN SER CALC-MCNC: 5.8 G/DL (ref 2–4)
GLUCOSE SERPL-MCNC: 76 MG/DL (ref 65–100)
GLUCOSE UR STRIP.AUTO-MCNC: NEGATIVE MG/DL
HCT VFR BLD AUTO: 38.7 % (ref 36.6–50.3)
HGB BLD-MCNC: 12.9 G/DL (ref 12.1–17)
HGB UR QL STRIP: ABNORMAL
IMM GRANULOCYTES # BLD: 0 K/UL (ref 0–0.04)
IMM GRANULOCYTES NFR BLD AUTO: 1 % (ref 0–0.5)
KETONES UR QL STRIP.AUTO: NEGATIVE MG/DL
LEUKOCYTE ESTERASE UR QL STRIP.AUTO: ABNORMAL
LYMPHOCYTES # BLD: 1.7 K/UL (ref 0.8–3.5)
LYMPHOCYTES NFR BLD: 24 % (ref 12–49)
MCH RBC QN AUTO: 29.6 PG (ref 26–34)
MCHC RBC AUTO-ENTMCNC: 33.3 G/DL (ref 30–36.5)
MCV RBC AUTO: 88.8 FL (ref 80–99)
MONOCYTES # BLD: 0.7 K/UL (ref 0–1)
MONOCYTES NFR BLD: 10 % (ref 5–13)
NEUTS SEG # BLD: 4.5 K/UL (ref 1.8–8)
NEUTS SEG NFR BLD: 65 % (ref 32–75)
NITRITE UR QL STRIP.AUTO: POSITIVE
NRBC # BLD: 0 K/UL (ref 0–0.01)
NRBC BLD-RTO: 0 PER 100 WBC
PH UR STRIP: 6 [PH] (ref 5–8)
PLATELET # BLD AUTO: 337 K/UL (ref 150–400)
PMV BLD AUTO: 11.2 FL (ref 8.9–12.9)
POTASSIUM SERPL-SCNC: 3.6 MMOL/L (ref 3.5–5.1)
PROT SERPL-MCNC: 8.7 G/DL (ref 6.4–8.2)
PROT UR STRIP-MCNC: ABNORMAL MG/DL
RBC # BLD AUTO: 4.36 M/UL (ref 4.1–5.7)
RBC #/AREA URNS HPF: ABNORMAL /HPF (ref 0–5)
SODIUM SERPL-SCNC: 136 MMOL/L (ref 136–145)
SP GR UR REFRACTOMETRY: 1.02 (ref 1–1.03)
UA: UC IF INDICATED,UAUC: ABNORMAL
UROBILINOGEN UR QL STRIP.AUTO: 1 EU/DL (ref 0.2–1)
WBC # BLD AUTO: 6.9 K/UL (ref 4.1–11.1)
WBC URNS QL MICRO: ABNORMAL /HPF (ref 0–4)

## 2018-02-12 PROCEDURE — 96374 THER/PROPH/DIAG INJ IV PUSH: CPT

## 2018-02-12 PROCEDURE — 74011250637 HC RX REV CODE- 250/637: Performed by: EMERGENCY MEDICINE

## 2018-02-12 PROCEDURE — 36415 COLL VENOUS BLD VENIPUNCTURE: CPT | Performed by: EMERGENCY MEDICINE

## 2018-02-12 PROCEDURE — 87186 SC STD MICRODIL/AGAR DIL: CPT | Performed by: EMERGENCY MEDICINE

## 2018-02-12 PROCEDURE — 87077 CULTURE AEROBIC IDENTIFY: CPT | Performed by: EMERGENCY MEDICINE

## 2018-02-12 PROCEDURE — 74011250636 HC RX REV CODE- 250/636: Performed by: EMERGENCY MEDICINE

## 2018-02-12 PROCEDURE — 99284 EMERGENCY DEPT VISIT MOD MDM: CPT

## 2018-02-12 PROCEDURE — 87086 URINE CULTURE/COLONY COUNT: CPT | Performed by: EMERGENCY MEDICINE

## 2018-02-12 PROCEDURE — 81001 URINALYSIS AUTO W/SCOPE: CPT | Performed by: EMERGENCY MEDICINE

## 2018-02-12 PROCEDURE — 80053 COMPREHEN METABOLIC PANEL: CPT | Performed by: EMERGENCY MEDICINE

## 2018-02-12 PROCEDURE — 85025 COMPLETE CBC W/AUTO DIFF WBC: CPT | Performed by: EMERGENCY MEDICINE

## 2018-02-12 PROCEDURE — 96361 HYDRATE IV INFUSION ADD-ON: CPT

## 2018-02-12 RX ORDER — CIPROFLOXACIN 500 MG/1
500 TABLET ORAL 2 TIMES DAILY
Qty: 14 TAB | Refills: 0 | Status: SHIPPED | OUTPATIENT
Start: 2018-02-12 | End: 2018-02-19

## 2018-02-12 RX ORDER — ONDANSETRON 2 MG/ML
4 INJECTION INTRAMUSCULAR; INTRAVENOUS
Status: COMPLETED | OUTPATIENT
Start: 2018-02-12 | End: 2018-02-12

## 2018-02-12 RX ORDER — SODIUM CHLORIDE 0.9 % (FLUSH) 0.9 %
5-10 SYRINGE (ML) INJECTION AS NEEDED
Status: DISCONTINUED | OUTPATIENT
Start: 2018-02-12 | End: 2018-02-12 | Stop reason: HOSPADM

## 2018-02-12 RX ORDER — CIPROFLOXACIN 500 MG/1
500 TABLET ORAL
Status: COMPLETED | OUTPATIENT
Start: 2018-02-12 | End: 2018-02-12

## 2018-02-12 RX ORDER — ONDANSETRON 4 MG/1
4 TABLET, ORALLY DISINTEGRATING ORAL
Qty: 10 TAB | Refills: 0 | Status: SHIPPED | OUTPATIENT
Start: 2018-02-12 | End: 2018-03-21

## 2018-02-12 RX ORDER — SODIUM CHLORIDE 0.9 % (FLUSH) 0.9 %
5-10 SYRINGE (ML) INJECTION EVERY 8 HOURS
Status: DISCONTINUED | OUTPATIENT
Start: 2018-02-12 | End: 2018-02-12 | Stop reason: HOSPADM

## 2018-02-12 RX ADMIN — SODIUM CHLORIDE 1000 ML: 900 INJECTION, SOLUTION INTRAVENOUS at 18:43

## 2018-02-12 RX ADMIN — ONDANSETRON 4 MG: 2 INJECTION, SOLUTION INTRAMUSCULAR; INTRAVENOUS at 18:43

## 2018-02-12 RX ADMIN — CIPROFLOXACIN HYDROCHLORIDE 500 MG: 500 TABLET, FILM COATED ORAL at 19:35

## 2018-02-12 NOTE — ED TRIAGE NOTES
Patient c/o vomiting, nausea, body aches, chills x 2 days. Denies fever. Patient also c/o lower back pain x 2 weeks. Denies injury. Patient is a paraplegic from the waist down.

## 2018-02-12 NOTE — ED NOTES
Emergency Department Nursing Plan of Care       The Nursing Plan of Care is developed from the Nursing assessment and Emergency Department Attending provider initial evaluation. The plan of care may be reviewed in the ED Provider note. The Plan of Care was developed with the following considerations:   Patient / Family readiness to learn indicated by:verbalized understanding  Persons(s) to be included in education: patient and family  Barriers to Learning/Limitations:No    Signed     Bernadette Divine    2/12/2018   6:48 PM    See nursing assessment    Patient is alert and oriented x 4 and in no acute distress at this time. Respirations are at a regular rate, depth, and pattern. Patient updated on plan of care and has no questions or concerns at this time.
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
## 2018-02-13 NOTE — ED NOTES
Verbal shift change report given to ALMA Aleman (oncoming nurse) by Kaitlin Wilson (offgoing nurse). Report included the following information SBAR, ED Summary, Procedure Summary, MAR and Recent Results.

## 2018-02-13 NOTE — PROGRESS NOTES
CM reviewed chart. CM met with patient and verified for correct patient. Patient is in need of PCP and would like to be set up with PCP appointment. CM will call to schedule appointment for patient tomorrow when the office opens then call patient to communicate patient appointment. CM also provided patient with list of counseling services through [employer]. 2/13/18  CM reviewed chart. CM called [facility] and scheduled an appointment with Jacinta Carranza NP on Thursday 2/15/18 at 8:50 am. CM called patient and made patient's mother aware (patient gave consent yesterday at hospital visit for his mother to obtain the information about the scheduled follow-up PCP appointment). Mother expressed verbal understanding of the appointment and was asked to call back if she had any questions or concerns.        PARAS Page MSW, STEFANHP

## 2018-02-13 NOTE — DISCHARGE INSTRUCTIONS
Nausea and Vomiting: Care Instructions  Your Care Instructions    When you are nauseated, you may feel weak and sweaty and notice a lot of saliva in your mouth. Nausea often leads to vomiting. Most of the time you do not need to worry about nausea and vomiting, but they can be signs of other illnesses. Two common causes of nausea and vomiting are stomach flu and food poisoning. Nausea and vomiting from viral stomach flu will usually start to improve within 24 hours. Nausea and vomiting from food poisoning may last from 12 to 48 hours. The doctor has checked you carefully, but problems can develop later. If you notice any problems or new symptoms, get medical treatment right away. Follow-up care is a key part of your treatment and safety. Be sure to make and go to all appointments, and call your doctor if you are having problems. It's also a good idea to know your test results and keep a list of the medicines you take. How can you care for yourself at home? · To prevent dehydration, drink plenty of fluids, enough so that your urine is light yellow or clear like water. Choose water and other caffeine-free clear liquids until you feel better. If you have kidney, heart, or liver disease and have to limit fluids, talk with your doctor before you increase the amount of fluids you drink. · Rest in bed until you feel better. · When you are able to eat, try clear soups, mild foods, and liquids until all symptoms are gone for 12 to 48 hours. Other good choices include dry toast, crackers, cooked cereal, and gelatin dessert, such as Jell-O. When should you call for help? Call 911 anytime you think you may need emergency care. For example, call if:  ? · You passed out (lost consciousness). ?Call your doctor now or seek immediate medical care if:  ? · You have symptoms of dehydration, such as:  ¨ Dry eyes and a dry mouth. ¨ Passing only a little dark urine.   ¨ Feeling thirstier than usual.   ? · You have new or worsening belly pain. ? · You have a new or higher fever. ? · You vomit blood or what looks like coffee grounds. ? Watch closely for changes in your health, and be sure to contact your doctor if:  ? · You have ongoing nausea and vomiting. ? · Your vomiting is getting worse. ? · Your vomiting lasts longer than 2 days. ? · You are not getting better as expected. Where can you learn more? Go to http://elma-taqueria.info/. Enter 25 [id number] in the search box to learn more about \"Nausea and Vomiting: Care Instructions. \"  Current as of: March 20, 2017  Content Version: 11.4  © 6595-9373 [employer]. Care instructions adapted under license by [employer] (which disclaims liability or warranty for this information). If you have questions about a medical condition or this instruction, always ask your healthcare professional. [address] any warranty or liability for your use of this information.

## 2018-02-13 NOTE — ED NOTES
Patient (s) was given copy of dc instructions and no paper script(s) and two electronic scripts. Patient (s) has verbalized understanding of instructions and script (s). Patient given a current medication reconciliation form and verbalized understanding of their medications. Patient (s) has verbalized understanding of the importance of discussing medications with  his or her physician or clinic they will be following up with. Patient alert and oriented and in no acute distress. Patient offered wheelchair from treatment area to hospital entrance, patient declined hospital wheelchair but used personal wheelchair. Patient left ED with family.

## 2018-02-14 LAB
BACTERIA SPEC CULT: ABNORMAL
CC UR VC: ABNORMAL
SERVICE CMNT-IMP: ABNORMAL

## 2018-03-21 ENCOUNTER — HOSPITAL ENCOUNTER (EMERGENCY)
Age: 23
Discharge: HOME | End: 2018-03-21
Attending: EMERGENCY MEDICINE
Payer: MEDICAID

## 2018-03-21 VITALS
SYSTOLIC BLOOD PRESSURE: 111 MMHG | DIASTOLIC BLOOD PRESSURE: 58 MMHG | WEIGHT: 100 LBS | HEART RATE: 108 BPM | RESPIRATION RATE: 18 BRPM | HEIGHT: 73 IN | TEMPERATURE: 98 F | OXYGEN SATURATION: 100 % | BODY MASS INDEX: 13.25 KG/M2

## 2018-03-21 DIAGNOSIS — N39.0 URINARY TRACT INFECTION WITHOUT HEMATURIA, SITE UNSPECIFIED: Primary | ICD-10-CM

## 2018-03-21 LAB
ALBUMIN SERPL-MCNC: 3.2 G/DL (ref 3.5–5)
ALBUMIN/GLOB SERPL: 0.6 {RATIO} (ref 1.1–2.2)
ALP SERPL-CCNC: 70 U/L (ref 45–117)
ALT SERPL-CCNC: 15 U/L (ref 12–78)
ANION GAP SERPL CALC-SCNC: 10 MMOL/L (ref 5–15)
APPEARANCE UR: ABNORMAL
AST SERPL-CCNC: 9 U/L (ref 15–37)
BACTERIA URNS QL MICRO: ABNORMAL /HPF
BASOPHILS # BLD: 0 K/UL (ref 0–0.1)
BASOPHILS NFR BLD: 0 % (ref 0–1)
BILIRUB SERPL-MCNC: 0.5 MG/DL (ref 0.2–1)
BILIRUB UR QL: NEGATIVE
BUN SERPL-MCNC: 16 MG/DL (ref 6–20)
BUN/CREAT SERPL: 30 (ref 12–20)
CALCIUM SERPL-MCNC: 9.3 MG/DL (ref 8.5–10.1)
CHLORIDE SERPL-SCNC: 97 MMOL/L (ref 97–108)
CO2 SERPL-SCNC: 28 MMOL/L (ref 21–32)
COLOR UR: ABNORMAL
CREAT SERPL-MCNC: 0.54 MG/DL (ref 0.7–1.3)
DIFFERENTIAL METHOD BLD: ABNORMAL
EOSINOPHIL # BLD: 0.1 K/UL (ref 0–0.4)
EOSINOPHIL NFR BLD: 1 % (ref 0–7)
EPITH CASTS URNS QL MICRO: ABNORMAL /LPF
ERYTHROCYTE [DISTWIDTH] IN BLOOD BY AUTOMATED COUNT: 13.2 % (ref 11.5–14.5)
GLOBULIN SER CALC-MCNC: 5 G/DL (ref 2–4)
GLUCOSE SERPL-MCNC: 100 MG/DL (ref 65–100)
GLUCOSE UR STRIP.AUTO-MCNC: NEGATIVE MG/DL
HCT VFR BLD AUTO: 41.8 % (ref 36.6–50.3)
HGB BLD-MCNC: 13.7 G/DL (ref 12.1–17)
HGB UR QL STRIP: NEGATIVE
IMM GRANULOCYTES # BLD: 0 K/UL (ref 0–0.04)
IMM GRANULOCYTES NFR BLD AUTO: 0 % (ref 0–0.5)
KETONES UR QL STRIP.AUTO: NEGATIVE MG/DL
LEUKOCYTE ESTERASE UR QL STRIP.AUTO: ABNORMAL
LYMPHOCYTES # BLD: 2.3 K/UL (ref 0.8–3.5)
LYMPHOCYTES NFR BLD: 24 % (ref 12–49)
MCH RBC QN AUTO: 29.1 PG (ref 26–34)
MCHC RBC AUTO-ENTMCNC: 32.8 G/DL (ref 30–36.5)
MCV RBC AUTO: 88.7 FL (ref 80–99)
MONOCYTES # BLD: 0.8 K/UL (ref 0–1)
MONOCYTES NFR BLD: 9 % (ref 5–13)
NEUTS SEG # BLD: 6.3 K/UL (ref 1.8–8)
NEUTS SEG NFR BLD: 66 % (ref 32–75)
NITRITE UR QL STRIP.AUTO: POSITIVE
NRBC # BLD: 0 K/UL (ref 0–0.01)
NRBC BLD-RTO: 0 PER 100 WBC
PH UR STRIP: 7 [PH] (ref 5–8)
PLATELET # BLD AUTO: 458 K/UL (ref 150–400)
PMV BLD AUTO: 9.4 FL (ref 8.9–12.9)
POTASSIUM SERPL-SCNC: 3.3 MMOL/L (ref 3.5–5.1)
PROT SERPL-MCNC: 8.2 G/DL (ref 6.4–8.2)
PROT UR STRIP-MCNC: NEGATIVE MG/DL
RBC # BLD AUTO: 4.71 M/UL (ref 4.1–5.7)
RBC #/AREA URNS HPF: ABNORMAL /HPF (ref 0–5)
SODIUM SERPL-SCNC: 135 MMOL/L (ref 136–145)
SP GR UR REFRACTOMETRY: 1.02 (ref 1–1.03)
UA: UC IF INDICATED,UAUC: ABNORMAL
UROBILINOGEN UR QL STRIP.AUTO: 2 EU/DL (ref 0.2–1)
WBC # BLD AUTO: 9.6 K/UL (ref 4.1–11.1)
WBC URNS QL MICRO: ABNORMAL /HPF (ref 0–4)

## 2018-03-21 PROCEDURE — 36415 COLL VENOUS BLD VENIPUNCTURE: CPT | Performed by: PHYSICIAN ASSISTANT

## 2018-03-21 PROCEDURE — 74011250636 HC RX REV CODE- 250/636: Performed by: PHYSICIAN ASSISTANT

## 2018-03-21 PROCEDURE — 87086 URINE CULTURE/COLONY COUNT: CPT | Performed by: PHYSICIAN ASSISTANT

## 2018-03-21 PROCEDURE — 81001 URINALYSIS AUTO W/SCOPE: CPT | Performed by: PHYSICIAN ASSISTANT

## 2018-03-21 PROCEDURE — 87077 CULTURE AEROBIC IDENTIFY: CPT | Performed by: PHYSICIAN ASSISTANT

## 2018-03-21 PROCEDURE — 80053 COMPREHEN METABOLIC PANEL: CPT | Performed by: PHYSICIAN ASSISTANT

## 2018-03-21 PROCEDURE — 85025 COMPLETE CBC W/AUTO DIFF WBC: CPT | Performed by: PHYSICIAN ASSISTANT

## 2018-03-21 PROCEDURE — 87186 SC STD MICRODIL/AGAR DIL: CPT | Performed by: PHYSICIAN ASSISTANT

## 2018-03-21 PROCEDURE — 74011250637 HC RX REV CODE- 250/637: Performed by: PHYSICIAN ASSISTANT

## 2018-03-21 PROCEDURE — 96361 HYDRATE IV INFUSION ADD-ON: CPT

## 2018-03-21 PROCEDURE — 96374 THER/PROPH/DIAG INJ IV PUSH: CPT

## 2018-03-21 PROCEDURE — 99284 EMERGENCY DEPT VISIT MOD MDM: CPT

## 2018-03-21 RX ORDER — CIPROFLOXACIN 500 MG/1
500 TABLET ORAL 2 TIMES DAILY
Qty: 14 TAB | Refills: 0 | OUTPATIENT
Start: 2018-03-21 | End: 2018-03-21

## 2018-03-21 RX ORDER — ACETAMINOPHEN 325 MG/1
650 TABLET ORAL
Status: COMPLETED | OUTPATIENT
Start: 2018-03-21 | End: 2018-03-21

## 2018-03-21 RX ORDER — SODIUM CHLORIDE 0.9 % (FLUSH) 0.9 %
5-10 SYRINGE (ML) INJECTION AS NEEDED
Status: DISCONTINUED | OUTPATIENT
Start: 2018-03-21 | End: 2018-03-22 | Stop reason: HOSPADM

## 2018-03-21 RX ORDER — CIPROFLOXACIN 500 MG/1
500 TABLET ORAL 2 TIMES DAILY
Qty: 14 TAB | Refills: 0 | Status: SHIPPED | OUTPATIENT
Start: 2018-03-21 | End: 2018-03-28

## 2018-03-21 RX ORDER — SODIUM CHLORIDE 0.9 % (FLUSH) 0.9 %
5-10 SYRINGE (ML) INJECTION EVERY 8 HOURS
Status: DISCONTINUED | OUTPATIENT
Start: 2018-03-21 | End: 2018-03-22 | Stop reason: HOSPADM

## 2018-03-21 RX ORDER — ONDANSETRON 2 MG/ML
4 INJECTION INTRAMUSCULAR; INTRAVENOUS
Status: COMPLETED | OUTPATIENT
Start: 2018-03-21 | End: 2018-03-21

## 2018-03-21 RX ORDER — CIPROFLOXACIN 500 MG/1
500 TABLET ORAL
Status: COMPLETED | OUTPATIENT
Start: 2018-03-21 | End: 2018-03-21

## 2018-03-21 RX ORDER — ONDANSETRON 4 MG/1
4 TABLET, ORALLY DISINTEGRATING ORAL
Qty: 10 TAB | Refills: 0 | OUTPATIENT
Start: 2018-03-21 | End: 2018-03-21

## 2018-03-21 RX ORDER — ONDANSETRON 4 MG/1
4 TABLET, ORALLY DISINTEGRATING ORAL
Qty: 10 TAB | Refills: 0 | Status: SHIPPED | OUTPATIENT
Start: 2018-03-21 | End: 2019-02-13

## 2018-03-21 RX ADMIN — ONDANSETRON 4 MG: 2 INJECTION, SOLUTION INTRAMUSCULAR; INTRAVENOUS at 21:04

## 2018-03-21 RX ADMIN — Medication 10 ML: at 21:05

## 2018-03-21 RX ADMIN — ACETAMINOPHEN 650 MG: 325 TABLET, FILM COATED ORAL at 21:04

## 2018-03-21 RX ADMIN — SODIUM CHLORIDE 1000 ML: 900 INJECTION, SOLUTION INTRAVENOUS at 21:04

## 2018-03-21 RX ADMIN — CIPROFLOXACIN HYDROCHLORIDE 500 MG: 500 TABLET, FILM COATED ORAL at 22:22
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
## 2018-03-22 NOTE — ED NOTES
Pt continues to rest quietly in bed in position of comfort. Updated on plan of care. Call bell within reach. IVF continue to infuse with no redness, swelling or tenderness observed.

## 2018-03-22 NOTE — PROGRESS NOTES
CM called patient to follow up on his ED visit on 3/21/18. Per the referral to [facility], patient needs assistance with obtaining his medication. Per the medical record, he has medicaid which covers medications. CM called patient at telephone niumber [phone] and the person who answered stated that he was not in. CM left a message for patient to return the call.

## 2018-03-22 NOTE — ED NOTES
Patient (s)  given copy of dc instructions and 2 script(s). Patient (s)  verbalized understanding of instructions and script (s). Patient given a current medication reconciliation form and verbalized understanding of their medications. Patient (s) verbalized understanding of the importance of discussing medications with  his or her physician or clinic they will be following up with. Patient alert and oriented and in no acute distress. Patient discharged home ambulatory with family via wheelchair. [place]

## 2018-03-22 NOTE — DISCHARGE INSTRUCTIONS

## 2018-03-22 NOTE — ED NOTES
Pt arrived to ED via stretcher with c/o abdominal pain and nausea x2 days. . Pt is alert and orientated X 4; skin is intact; lungs are clear; pt breathes well on room air; Pt is in no acute distress. Will continue to monitor. See nursing assessment. Safety precautions in place; call light within reach. Emergency Department Nursing Plan of Care       The Nursing Plan of Care is developed from the Nursing assessment and Emergency Department Attending provider initial evaluation. The plan of care may be reviewed in the ED Provider note.     The Plan of Care was developed with the following considerations:   Patient / Family readiness to learn indicated by:verbalized understanding  Persons(s) to be included in education: patient  Barriers to Learning/Limitations:No    Signed     Armida Evans RN    3/21/2018   8:16 PM

## 2018-03-24 LAB
BACTERIA SPEC CULT: ABNORMAL
CC UR VC: ABNORMAL
SERVICE CMNT-IMP: ABNORMAL

## 2019-02-13 ENCOUNTER — HOSPITAL ENCOUNTER (EMERGENCY)
Age: 24
Discharge: HOME | End: 2019-02-13
Attending: EMERGENCY MEDICINE
Payer: MEDICAID

## 2019-02-13 VITALS
OXYGEN SATURATION: 99 % | RESPIRATION RATE: 17 BRPM | HEART RATE: 76 BPM | TEMPERATURE: 97.9 F | HEIGHT: 72 IN | BODY MASS INDEX: 13.95 KG/M2 | WEIGHT: 103 LBS

## 2019-02-13 DIAGNOSIS — H10.32 ACUTE BACTERIAL CONJUNCTIVITIS OF LEFT EYE: Primary | ICD-10-CM

## 2019-02-13 PROCEDURE — 99282 EMERGENCY DEPT VISIT SF MDM: CPT

## 2019-02-13 RX ORDER — POLYMYXIN B SULFATE AND TRIMETHOPRIM 1; 10000 MG/ML; [USP'U]/ML
1 SOLUTION OPHTHALMIC EVERY 4 HOURS
Qty: 10 ML | Refills: 0 | Status: SHIPPED | OUTPATIENT
Start: 2019-02-13 | End: 2019-10-27

## 2019-02-13 NOTE — ED NOTES
Emergency Department Nursing Plan of Care The Nursing Plan of Care is developed from the Nursing assessment and Emergency Department Attending provider initial evaluation. The plan of care may be reviewed in the ED Provider note. The Plan of Care was developed with the following considerations:  
Patient / Family readiness to learn indicated by:verbalized understanding Persons(s) to be included in education: patient Barriers to Learning/Limitations:No 
 
Signed Kory Romero 2/13/2019   12:35 PM
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
## 2019-02-13 NOTE — DISCHARGE INSTRUCTIONS

## 2019-10-17 ENCOUNTER — HOSPITAL ENCOUNTER (EMERGENCY)
Age: 24
Discharge: HOME | End: 2019-10-17
Attending: EMERGENCY MEDICINE
Payer: MEDICAID

## 2019-10-17 VITALS
SYSTOLIC BLOOD PRESSURE: 120 MMHG | OXYGEN SATURATION: 92 % | RESPIRATION RATE: 16 BRPM | TEMPERATURE: 97.6 F | DIASTOLIC BLOOD PRESSURE: 81 MMHG | HEART RATE: 54 BPM

## 2019-10-17 DIAGNOSIS — M62.830 BACK SPASM: Primary | ICD-10-CM

## 2019-10-17 PROCEDURE — 99283 EMERGENCY DEPT VISIT LOW MDM: CPT

## 2019-10-17 PROCEDURE — 74011250637 HC RX REV CODE- 250/637: Performed by: EMERGENCY MEDICINE

## 2019-10-17 PROCEDURE — 74011000250 HC RX REV CODE- 250: Performed by: EMERGENCY MEDICINE

## 2019-10-17 RX ORDER — ACETAMINOPHEN 500 MG
1000 TABLET ORAL ONCE
Status: COMPLETED | OUTPATIENT
Start: 2019-10-17 | End: 2019-10-17

## 2019-10-17 RX ORDER — DIAZEPAM 5 MG/1
5 TABLET ORAL
Status: COMPLETED | OUTPATIENT
Start: 2019-10-17 | End: 2019-10-17

## 2019-10-17 RX ORDER — IBUPROFEN 600 MG/1
600 TABLET ORAL ONCE
Status: COMPLETED | OUTPATIENT
Start: 2019-10-17 | End: 2019-10-17

## 2019-10-17 RX ORDER — LIDOCAINE 4 G/100G
1 PATCH TOPICAL EVERY 24 HOURS
Status: DISCONTINUED | OUTPATIENT
Start: 2019-10-17 | End: 2019-10-17 | Stop reason: HOSPADM

## 2019-10-17 RX ADMIN — ACETAMINOPHEN 1000 MG: 500 TABLET, FILM COATED ORAL at 14:15

## 2019-10-17 RX ADMIN — DIAZEPAM 5 MG: 5 TABLET ORAL at 14:15

## 2019-10-17 RX ADMIN — IBUPROFEN 600 MG: 600 TABLET, FILM COATED ORAL at 14:15

## 2019-10-17 NOTE — ED TRIAGE NOTES
Patient presents to the ED with c/o lower back pain x1 month. Pt reports being diagnosed with a urinary tract infection and has medications. PT reports that he straight cath. Pt reports multiple pressure sores on his bottom. Pt denies taking any medications.

## 2019-10-17 NOTE — ED NOTES
Three pressure ulcers that were present on arrival were dressed with Mepilex dressing per provider's verbal orders.

## 2019-10-17 NOTE — ED NOTES
Emergency Department Nursing Plan of Care       The Nursing Plan of Care is developed from the Nursing assessment and Emergency Department Attending provider initial evaluation. The plan of care may be reviewed in the ED Provider note.     The Plan of Care was developed with the following considerations:   Patient / Family readiness to learn indicated by:verbalized understanding  Persons(s) to be included in education: patient  Barriers to Learning/Limitations:No    [address]     10/17/2019   2:00 PM

## 2019-10-17 NOTE — DISCHARGE INSTRUCTIONS
PAIN CONTROL  Tylenol 1000 mg every 6 hours  Ibuprofen 600mg every 6 hours  Lidocaine Patch 4% (Over the counter - called Salonpas)  Heat, ice, massage

## 2019-10-17 NOTE — ED NOTES
Patient given copy of dc instructions and 0 paper script(s) and 0 electronic scripts. Patient  verbalized understanding of instructions and script (s). Patient given a current medication reconciliation form and verbalized understanding of their medications. Patient  verbalized understanding of the importance of discussing medications with  his or her physician or clinic they will be following up with. Patient alert and oriented and in no acute distress. Patient offered wheelchair from treatment area to hospital entrance, patient declined wheelchair. Patient informed on medication he can use that are OTC to help with his back pain.
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
## 2019-10-27 ENCOUNTER — HOSPITAL ENCOUNTER (EMERGENCY)
Age: 24
Discharge: HOME | End: 2019-10-27
Attending: EMERGENCY MEDICINE
Payer: MEDICAID

## 2019-10-27 VITALS
RESPIRATION RATE: 15 BRPM | WEIGHT: 107 LBS | SYSTOLIC BLOOD PRESSURE: 140 MMHG | HEART RATE: 105 BPM | TEMPERATURE: 99 F | BODY MASS INDEX: 14.51 KG/M2 | OXYGEN SATURATION: 100 % | DIASTOLIC BLOOD PRESSURE: 90 MMHG

## 2019-10-27 DIAGNOSIS — G82.20 PARAPLEGIA (HCC): ICD-10-CM

## 2019-10-27 DIAGNOSIS — Z87.828 HISTORY OF GUNSHOT WOUND: ICD-10-CM

## 2019-10-27 DIAGNOSIS — L89.109 PRESSURE INJURY OF SKIN OF BACK, UNSPECIFIED INJURY STAGE: Primary | ICD-10-CM

## 2019-10-27 PROCEDURE — 99284 EMERGENCY DEPT VISIT MOD MDM: CPT

## 2019-10-27 PROCEDURE — 74011250637 HC RX REV CODE- 250/637: Performed by: PHYSICIAN ASSISTANT

## 2019-10-27 RX ORDER — OXYCODONE AND ACETAMINOPHEN 5; 325 MG/1; MG/1
2 TABLET ORAL
Status: COMPLETED | OUTPATIENT
Start: 2019-10-27 | End: 2019-10-27

## 2019-10-27 RX ORDER — CEPHALEXIN 500 MG/1
CAPSULE ORAL
COMMUNITY
Start: 2019-10-17 | End: 2022-02-02

## 2019-10-27 RX ORDER — CYCLOBENZAPRINE HCL 10 MG
10 TABLET ORAL DAILY
COMMUNITY
Start: 2019-10-17 | End: 2022-03-31

## 2019-10-27 RX ADMIN — OXYCODONE HYDROCHLORIDE AND ACETAMINOPHEN 2 TABLET: 5; 325 TABLET ORAL at 13:39
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
## 2019-10-27 NOTE — DISCHARGE INSTRUCTIONS
Patient Education        Pressure Injuries: Care Instructions  Your Care Instructions    A pressure injury on the skin is caused by constant pressure to that area. These injuries--also called decubitus ulcers or bedsores--may happen when you lie in bed or sit in a wheelchair for a long time. The constant pressure blocks the blood supply to the skin. This causes skin cells to die and creates a sore. Pressure injuries usually occur over bony areas, such as the hips, lower back, elbows, heels, and shoulders. They also can occur in places where the skin folds over on itself. You may have mild redness or open sores that are harder to heal.  Good care at home can help heal pressure injuries. You or your caregiver needs to check your skin every day for sores. You need good nutrition and plenty of fluids to keep your skin healthy and prevent new pressure injuries. Follow-up care is a key part of your treatment and safety. Be sure to make and go to all appointments, and call your doctor if you are having problems. It's also a good idea to know your test results and keep a list of the medicines you take. How can you care for yourself at home? · If your doctor prescribed a medicated ointment or cream, use it exactly as prescribed. Call your doctor if you think you are having a problem with your medicine. · Wash pressure injuries every day, or as often as your doctor recommends. Most tap water is safe, but follow the advice of your doctor or nurse. He or she may recommend that you use a saline solution. This is a salt and water solution that you can buy over the counter. · Put on bandages as your doctor or wound care specialist says. · Keep healthy tissue around the sore clean and dry. · Check your skin every day for sores (or have a caregiver do it). · If you know what is causing the pressure that caused the sore, find a way to remove that pressure.   To prevent pressure injuries  · Change your position or have your caregiver help you change your position often. You may need to do this every 2 hours if you are in bed or every 15 minutes if you are in a wheelchair. This lowers the chance of making sores worse and getting new sores. · Use special mattresses or other support. These may include low-pressure mattresses or cushions made of foam that can be filled with air, water, beads, or fiber. · Eat healthy foods with plenty of protein to help heal damaged skin and to help new skin grow. · Try to stay at a healthy weight. Being overweight can lead to more pressure on your skin. · Do not slide across sheets or slump in a chair or bed. · Do not smoke. Smoking dries the skin and reduces its blood supply. If you need help quitting, talk to your doctor about stop-smoking programs and medicines. These can increase your chances of quitting for good. When should you call for help? Call your doctor now or seek immediate medical care if:    · You have signs of infection, such as:  ? Increased pain, swelling, warmth, or redness. ? Red streaks leading from the sore. ? Pus draining from the sore. ? A fever.    Watch closely for changes in your health, and be sure to contact your doctor if:    · Your pressure injuries are not healing.     · You have new pressure injuries.     · You need help changing positions in bed or in a chair.     · Your caregiver needs help to move you. Where can you learn more? Go to http://elma-taqueria.info/. Enter F114 in the search box to learn more about \"Pressure Injuries: Care Instructions. \"  Current as of: September 26, 2018  Content Version: 12.2  © 8071-6837 [employer]. Care instructions adapted under license by [employer] (which disclaims liability or warranty for this information).  If you have questions about a medical condition or this instruction, always ask your healthcare professional. [employer] disclaims any warranty or liability for your use of this information.

## 2019-10-27 NOTE — ED NOTES
Patient has been instructed that they have been given percocet* which contains opioids, benzodiazepines, or other sedating drugs. Patient is aware that they  will need to refrain from driving or operating heavy machinery after taking this medication. Patient also instructed that they need to avoid drinking alcohol and using other products containing opioids, benzodiazepines, or other sedating drugs. Patient verbalized understanding.
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
## 2019-10-27 NOTE — ED NOTES
Discharge instructions were given to the patient by BARBIE Sarmiento RN. .     The patient left the Emergency Department ambulatory, alert and oriented and in no acute distress with 0 prescription(s). The patient was encouraged to call or return to the ED for worsening symptoms or problems and was encouraged to schedule a follow up appointment for continuing care. Patient leaving ED accompanied by girlfriend. The patient verbalized understanding of discharge instructions and prescriptions, all questions were answered. The patient has no further concerns at this time. Patient declined wheelchair transfer upon ED discharge.
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
## 2020-02-22 ENCOUNTER — HOSPITAL ENCOUNTER (EMERGENCY)
Age: 25
Discharge: HOME | End: 2020-02-22
Attending: EMERGENCY MEDICINE
Payer: MEDICAID

## 2020-02-22 VITALS
OXYGEN SATURATION: 99 % | SYSTOLIC BLOOD PRESSURE: 156 MMHG | RESPIRATION RATE: 16 BRPM | DIASTOLIC BLOOD PRESSURE: 75 MMHG | HEART RATE: 104 BPM | BODY MASS INDEX: 13.33 KG/M2 | WEIGHT: 98.31 LBS

## 2020-02-22 DIAGNOSIS — R39.9 URINARY TRACT INFECTION SYMPTOMS: Primary | ICD-10-CM

## 2020-02-22 LAB
APPEARANCE UR: CLEAR
BACTERIA URNS QL MICRO: NEGATIVE /HPF
BILIRUB UR QL: NEGATIVE
COLOR UR: ABNORMAL
EPITH CASTS URNS QL MICRO: ABNORMAL /LPF
GLUCOSE UR STRIP.AUTO-MCNC: NEGATIVE MG/DL
HGB UR QL STRIP: NEGATIVE
KETONES UR QL STRIP.AUTO: NEGATIVE MG/DL
LEUKOCYTE ESTERASE UR QL STRIP.AUTO: ABNORMAL
MUCOUS THREADS URNS QL MICRO: ABNORMAL /LPF
NITRITE UR QL STRIP.AUTO: NEGATIVE
PH UR STRIP: 6.5 [PH] (ref 5–8)
PROT UR STRIP-MCNC: NEGATIVE MG/DL
RBC #/AREA URNS HPF: ABNORMAL /HPF (ref 0–5)
SP GR UR REFRACTOMETRY: 1.02 (ref 1–1.03)
UA: UC IF INDICATED,UAUC: ABNORMAL
UROBILINOGEN UR QL STRIP.AUTO: 1 EU/DL (ref 0.2–1)
WBC URNS QL MICRO: ABNORMAL /HPF (ref 0–4)

## 2020-02-22 PROCEDURE — 87086 URINE CULTURE/COLONY COUNT: CPT

## 2020-02-22 PROCEDURE — 99285 EMERGENCY DEPT VISIT HI MDM: CPT

## 2020-02-22 PROCEDURE — 81001 URINALYSIS AUTO W/SCOPE: CPT

## 2020-02-22 PROCEDURE — 87077 CULTURE AEROBIC IDENTIFY: CPT

## 2020-02-22 PROCEDURE — 87186 SC STD MICRODIL/AGAR DIL: CPT

## 2020-02-22 NOTE — ED TRIAGE NOTES
Reports chronic pressure ulcers x 1 year. L large full thickness wounds right glute. Reports increased drainage and pain.
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
## 2020-02-23 NOTE — ED NOTES
PA and this RN at the bed side at the bedside assessing patient wounds. Patient states that he believes he has a UTI, patient straight cath self at home. Patient is alert and oriented x 4 and in no acute distress at this time. Respirations are at a regular rate, depth, and pattern. Patient updated on plan of care and has no questions or concerns at this time. Call bell within reach. Will continue to monitor. Please reference nursing assessment. Emergency Department Nursing Plan of Care       The Nursing Plan of Care is developed from the Nursing assessment and Emergency Department Attending provider initial evaluation. The plan of care may be reviewed in the ED Provider note.     The Plan of Care was developed with the following considerations:   Patient / Family readiness to learn indicated by:verbalized understanding and successful return demonstration  Persons(s) to be included in education: patient  Barriers to Learning/Limitations:No    Signed     Rachel Mesa RN    2/22/2020   6:30 PM

## 2020-02-23 NOTE — ED NOTES
Three stage IV sacral wounds cleansed with sterile NS and packed wet to dry utilizing aseptic technique with sterile 4x4's secured with mepilex dressings. Pt. Tolerated dressing change with no complaints voiced.

## 2020-02-23 NOTE — ED NOTES
Bedside shift change report given to Latricia Sanz RN (oncoming nurse) by Jermain Cota RN (offgoing nurse). Report included the following information SBAR, ED Summary, Procedure Summary, MAR and Recent Results.

## 2020-02-23 NOTE — DISCHARGE INSTRUCTIONS
Patient Education        Spinal Cord Injury (Paraplegic): Care Instructions  Your Care Instructions  A spinal cord injury occurs when a bone of the spine (vertebra) cuts or presses on the spinal cord. This can happen after a fall, car accident, or sports injury. It can also happen if something pierces the spinal cord. A spinal cord injury stops the communication between the brain and the rest of the body. The closer the injury is to the head, the more the body is affected. A serious spinal cord injury in the middle of the back usually causes loss of use of the legs (paralysis). It also usually causes loss of feeling in the legs. Loss of use and feeling in the legs is called paraplegia. First treatments for spinal cord injuries include preventing more damage to the spine and spinal cord. This can be done with braces, casts, straps, or surgery. Medicine may reduce swelling in the spinal cord. You may need surgery to remove bone, straighten the spine, or make the spine more stable. Long-term rehabilitation includes exercises to strengthen muscles that still work. You will also get help learning how to use braces and other tools to do everyday tasks. Researchers are working on new treatments. Some medicines may help the spinal cord heal. Implanted devices may help restore lost function. Realizing you are paralyzed is scary. You will feel many emotions. And you may need help coping. Seek out family, friends, and counselors for support. You also can do things at home to make yourself feel better while you go through treatment. Follow-up care is a key part of your treatment and safety. Be sure to make and go to all appointments, and call your doctor if you are having problems. It's also a good idea to know your test results and keep a list of the medicines you take. How can you care for yourself at home? · Let yourself grieve for the things you can no longer do.  Talk about your feelings with a family member, friend, or counselor. This can help you recover as much as possible. · Learn to take care of your bladder. This helps you avoid getting a urinary tract infection. You may need to insert a thin tube into your bladder regularly. This tube is called a catheter. It drains the urine from your bladder. A nurse will show you how to do this. · Work with your doctor or other health professional to make a bowel management program. This will help you have regular bowel movements. · Check your body often for signs of pressure injuries. These can be slow to heal and may become infected. They usually occur on the skin over bony areas such as the knees, hips, heels, or tailbone. And pressure injuries can occur in places where the skin folds over on itself. Change positions often to help prevent these sores. · Be alert for signs of a common problem called autonomic dysreflexia. This can happen when your body can't control blood pressure. It can cause headache, clammy skin, sweating, nausea, and a slow heart rate. · Do the exercises recommended by your therapist. Strong muscles can help you do everyday activities. · Get help with coping if you need it. Discuss your concerns with your doctor, counselor, or other health professional.  · Join a support group. Talking about your injury with other people who have problems like yours can help you learn to live with a spinal injury. When should you call for help? Call 911 anytime you think you may need emergency care. For example, call if:    · You have signs of a common problem called autonomic dysreflexia and the symptoms do not go away after 20 minutes. These include:  ? A pounding headache. ? A flushed face or red patches on your skin above the level of the spinal injury. ? Sweating above the level of the spinal injury. ? Nausea. ? Slow heart rate.   ? Cold, clammy skin above the level of the spinal injury.    Call your doctor now or seek immediate medical care if:    · You have signs of infection, such as:  ? Increased pain, swelling, warmth, or redness. ? Red streaks leading from an incision. ? Pus draining from an incision. ? A fever.     · You need help with urination and bowel movements.     · You have cloudy or foul-smelling urine.     · You have pressure injuries.     · You feel hopeless and depressed.    Watch closely for changes in your health, and be sure to contact your doctor if you have any problems. Where can you learn more? Go to http://elma-taqueria.info/. Enter Z114 in the search box to learn more about \"Spinal Cord Injury (Paraplegic): Care Instructions. \"  Current as of: March 28, 2019  Content Version: 12.2  © 8181-0212 [employer]. Care instructions adapted under license by [employer] (which disclaims liability or warranty for this information). If you have questions about a medical condition or this instruction, always ask your healthcare professional. [place] [address] any warranty or liability for your use of this information.

## 2020-02-25 LAB
BACTERIA SPEC CULT: ABNORMAL
CC UR VC: ABNORMAL
SERVICE CMNT-IMP: ABNORMAL

## 2020-02-27 RX ORDER — CIPROFLOXACIN 500 MG/1
500 TABLET ORAL 2 TIMES DAILY
Qty: 14 TAB | Refills: 0 | Status: SHIPPED | OUTPATIENT
Start: 2020-02-27 | End: 2020-03-05

## 2020-02-27 NOTE — PROGRESS NOTES
Spoke with patient on the phone. Cipro Rx sent to preferred pharmacy. We will follow-up with urology.

## 2022-01-08 ENCOUNTER — HOSPITAL ENCOUNTER (EMERGENCY)
Age: 27
Discharge: HOME | End: 2022-01-08
Attending: EMERGENCY MEDICINE
Payer: MEDICAID

## 2022-01-08 VITALS
HEART RATE: 78 BPM | WEIGHT: 93 LBS | OXYGEN SATURATION: 99 % | SYSTOLIC BLOOD PRESSURE: 140 MMHG | DIASTOLIC BLOOD PRESSURE: 70 MMHG | HEIGHT: 72 IN | BODY MASS INDEX: 12.6 KG/M2 | TEMPERATURE: 98.4 F | RESPIRATION RATE: 17 BRPM

## 2022-01-08 DIAGNOSIS — G89.29 OTHER CHRONIC PAIN: ICD-10-CM

## 2022-01-08 DIAGNOSIS — M62.830 SPASM OF BACK MUSCLES: Primary | ICD-10-CM

## 2022-01-08 PROCEDURE — 74011250636 HC RX REV CODE- 250/636: Performed by: EMERGENCY MEDICINE

## 2022-01-08 PROCEDURE — 96372 THER/PROPH/DIAG INJ SC/IM: CPT

## 2022-01-08 PROCEDURE — 99284 EMERGENCY DEPT VISIT MOD MDM: CPT

## 2022-01-08 PROCEDURE — 74011250637 HC RX REV CODE- 250/637: Performed by: EMERGENCY MEDICINE

## 2022-01-08 RX ORDER — OXYCODONE HYDROCHLORIDE 5 MG/1
10 TABLET ORAL
Status: COMPLETED | OUTPATIENT
Start: 2022-01-08 | End: 2022-01-08

## 2022-01-08 RX ORDER — IBUPROFEN 600 MG/1
600 TABLET ORAL
Qty: 20 TABLET | Refills: 0 | Status: SHIPPED | OUTPATIENT
Start: 2022-01-08 | End: 2022-02-02

## 2022-01-08 RX ORDER — KETOROLAC TROMETHAMINE 30 MG/ML
30 INJECTION, SOLUTION INTRAMUSCULAR; INTRAVENOUS
Status: COMPLETED | OUTPATIENT
Start: 2022-01-08 | End: 2022-01-08

## 2022-01-08 RX ORDER — OXYCODONE HYDROCHLORIDE 5 MG/1
5 TABLET ORAL
Qty: 15 TABLET | Refills: 0 | Status: SHIPPED | OUTPATIENT
Start: 2022-01-08 | End: 2022-01-12

## 2022-01-08 RX ADMIN — KETOROLAC TROMETHAMINE 30 MG: 30 INJECTION, SOLUTION INTRAMUSCULAR; INTRAVENOUS at 20:50

## 2022-01-08 RX ADMIN — OXYCODONE HYDROCHLORIDE 10 MG: 5 TABLET ORAL at 20:49
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
## 2022-01-25 LAB
ALBUMIN SERPL-MCNC: 1.7 G/DL (ref 3.5–5)
ALBUMIN/GLOB SERPL: 0.3 {RATIO} (ref 1.1–2.2)
ALP SERPL-CCNC: 134 U/L (ref 45–117)
ALT SERPL-CCNC: 7 U/L (ref 12–78)
ANION GAP SERPL CALC-SCNC: 6 MMOL/L (ref 5–15)
AST SERPL-CCNC: 9 U/L (ref 15–37)
BASOPHILS # BLD: 0 K/UL (ref 0–0.1)
BASOPHILS NFR BLD: 0 % (ref 0–1)
BILIRUB SERPL-MCNC: 0.9 MG/DL (ref 0.2–1)
BUN SERPL-MCNC: 11 MG/DL (ref 6–20)
BUN/CREAT SERPL: 25 (ref 12–20)
CALCIUM SERPL-MCNC: 8.5 MG/DL (ref 8.5–10.1)
CHLORIDE SERPL-SCNC: 99 MMOL/L (ref 97–108)
CO2 SERPL-SCNC: 26 MMOL/L (ref 21–32)
CREAT SERPL-MCNC: 0.44 MG/DL (ref 0.7–1.3)
DIFFERENTIAL METHOD BLD: ABNORMAL
EOSINOPHIL # BLD: 0 K/UL (ref 0–0.4)
EOSINOPHIL NFR BLD: 0 % (ref 0–7)
ERYTHROCYTE [DISTWIDTH] IN BLOOD BY AUTOMATED COUNT: 16.6 % (ref 11.5–14.5)
GLOBULIN SER CALC-MCNC: 6.2 G/DL (ref 2–4)
GLUCOSE SERPL-MCNC: 100 MG/DL (ref 65–100)
HCT VFR BLD AUTO: 26.6 % (ref 36.6–50.3)
HGB BLD-MCNC: 7.8 G/DL (ref 12.1–17)
IMM GRANULOCYTES # BLD AUTO: 0.1 K/UL (ref 0–0.04)
IMM GRANULOCYTES NFR BLD AUTO: 1 % (ref 0–0.5)
LYMPHOCYTES # BLD: 1.6 K/UL (ref 0.8–3.5)
LYMPHOCYTES NFR BLD: 9 % (ref 12–49)
MCH RBC QN AUTO: 23.9 PG (ref 26–34)
MCHC RBC AUTO-ENTMCNC: 29.3 G/DL (ref 30–36.5)
MCV RBC AUTO: 81.3 FL (ref 80–99)
MONOCYTES # BLD: 1.2 K/UL (ref 0–1)
MONOCYTES NFR BLD: 7 % (ref 5–13)
NEUTS SEG # BLD: 13.8 K/UL (ref 1.8–8)
NEUTS SEG NFR BLD: 83 % (ref 32–75)
NRBC # BLD: 0 K/UL (ref 0–0.01)
NRBC BLD-RTO: 0 PER 100 WBC
PLATELET # BLD AUTO: 695 K/UL (ref 150–400)
PMV BLD AUTO: 9.1 FL (ref 8.9–12.9)
POTASSIUM SERPL-SCNC: 3.6 MMOL/L (ref 3.5–5.1)
PROT SERPL-MCNC: 7.9 G/DL (ref 6.4–8.2)
RBC # BLD AUTO: 3.27 M/UL (ref 4.1–5.7)
SODIUM SERPL-SCNC: 131 MMOL/L (ref 136–145)
WBC # BLD AUTO: 16.7 K/UL (ref 4.1–11.1)

## 2022-01-25 PROCEDURE — 85025 COMPLETE CBC W/AUTO DIFF WBC: CPT

## 2022-01-25 PROCEDURE — 99285 EMERGENCY DEPT VISIT HI MDM: CPT

## 2022-01-25 PROCEDURE — 80053 COMPREHEN METABOLIC PANEL: CPT

## 2022-01-25 PROCEDURE — 36415 COLL VENOUS BLD VENIPUNCTURE: CPT

## 2022-01-25 PROCEDURE — 0JBN0ZZ EXCISION OF RIGHT LOWER LEG SUBCUTANEOUS TISSUE AND FASCIA, OPEN APPROACH: ICD-10-PCS | Performed by: SURGERY

## 2022-01-26 ENCOUNTER — HOSPITAL ENCOUNTER (INPATIENT)
Age: 27
LOS: 7 days | Discharge: HOME HEALTH | DRG: 720 | End: 2022-02-02
Attending: EMERGENCY MEDICINE | Admitting: STUDENT IN AN ORGANIZED HEALTH CARE EDUCATION/TRAINING PROGRAM
Payer: MEDICAID

## 2022-01-26 ENCOUNTER — APPOINTMENT (OUTPATIENT)
Dept: CT IMAGING | Age: 27
DRG: 720 | End: 2022-01-26
Attending: EMERGENCY MEDICINE
Payer: MEDICAID

## 2022-01-26 DIAGNOSIS — B95.5 STREPTOCOCCAL BACTEREMIA: ICD-10-CM

## 2022-01-26 DIAGNOSIS — L89.210 DECUBITUS ULCER OF HIP, RIGHT, UNSTAGEABLE (HCC): ICD-10-CM

## 2022-01-26 DIAGNOSIS — B96.20 E. COLI UTI: ICD-10-CM

## 2022-01-26 DIAGNOSIS — T83.510A URINARY TRACT INFECTION ASSOCIATED WITH CYSTOSTOMY CATHETER, INITIAL ENCOUNTER (HCC): Primary | ICD-10-CM

## 2022-01-26 DIAGNOSIS — L89.210 PRESSURE INJURY OF RIGHT HIP, UNSTAGEABLE (HCC): ICD-10-CM

## 2022-01-26 DIAGNOSIS — G82.20 PARAPLEGIA (HCC): ICD-10-CM

## 2022-01-26 DIAGNOSIS — N39.0 E. COLI UTI: ICD-10-CM

## 2022-01-26 DIAGNOSIS — N39.0 URINARY TRACT INFECTION ASSOCIATED WITH CYSTOSTOMY CATHETER, INITIAL ENCOUNTER (HCC): Primary | ICD-10-CM

## 2022-01-26 DIAGNOSIS — I47.29 NSVT (NONSUSTAINED VENTRICULAR TACHYCARDIA): ICD-10-CM

## 2022-01-26 DIAGNOSIS — B95.61 MSSA BACTEREMIA: ICD-10-CM

## 2022-01-26 DIAGNOSIS — R78.81 STREPTOCOCCAL BACTEREMIA: ICD-10-CM

## 2022-01-26 DIAGNOSIS — R78.81 MSSA BACTEREMIA: ICD-10-CM

## 2022-01-26 DIAGNOSIS — S31.000A WOUND OF SACRAL REGION, INITIAL ENCOUNTER: ICD-10-CM

## 2022-01-26 DIAGNOSIS — N30.00 ACUTE CYSTITIS WITHOUT HEMATURIA: ICD-10-CM

## 2022-01-26 DIAGNOSIS — L89.46: ICD-10-CM

## 2022-01-26 PROBLEM — L89.504 DECUBITUS ULCER OF ANKLE, STAGE 4 (HCC): Status: ACTIVE | Noted: 2022-01-26

## 2022-01-26 PROBLEM — A41.9 SEPSIS (HCC): Status: ACTIVE | Noted: 2022-01-26

## 2022-01-26 LAB
ALBUMIN SERPL-MCNC: 1.6 G/DL (ref 3.5–5)
ALBUMIN/GLOB SERPL: 0.3 {RATIO} (ref 1.1–2.2)
ALP SERPL-CCNC: 134 U/L (ref 45–117)
ALT SERPL-CCNC: 8 U/L (ref 12–78)
ANION GAP SERPL CALC-SCNC: 8 MMOL/L (ref 5–15)
APPEARANCE UR: ABNORMAL
AST SERPL-CCNC: 6 U/L (ref 15–37)
BACTERIA URNS QL MICRO: ABNORMAL /HPF
BASOPHILS # BLD: 0 K/UL (ref 0–0.1)
BASOPHILS NFR BLD: 0 % (ref 0–1)
BILIRUB SERPL-MCNC: 0.9 MG/DL (ref 0.2–1)
BILIRUB UR QL CFM: NEGATIVE
BUN SERPL-MCNC: 13 MG/DL (ref 6–20)
BUN/CREAT SERPL: 23 (ref 12–20)
CALCIUM SERPL-MCNC: 8.2 MG/DL (ref 8.5–10.1)
CHLORIDE SERPL-SCNC: 103 MMOL/L (ref 97–108)
CO2 SERPL-SCNC: 23 MMOL/L (ref 21–32)
COLOR UR: ABNORMAL
CREAT SERPL-MCNC: 0.57 MG/DL (ref 0.7–1.3)
CRP SERPL-MCNC: 31.1 MG/DL (ref 0–0.6)
DIFFERENTIAL METHOD BLD: ABNORMAL
EOSINOPHIL # BLD: 0 K/UL (ref 0–0.4)
EOSINOPHIL NFR BLD: 0 % (ref 0–7)
EPITH CASTS URNS QL MICRO: ABNORMAL /LPF
ERYTHROCYTE [DISTWIDTH] IN BLOOD BY AUTOMATED COUNT: 16.8 % (ref 11.5–14.5)
FERRITIN SERPL-MCNC: 495 NG/ML (ref 26–388)
FOLATE SERPL-MCNC: 4.9 NG/ML (ref 5–21)
GLOBULIN SER CALC-MCNC: 6.1 G/DL (ref 2–4)
GLUCOSE BLD STRIP.AUTO-MCNC: 70 MG/DL (ref 65–117)
GLUCOSE SERPL-MCNC: 83 MG/DL (ref 65–100)
GLUCOSE UR STRIP.AUTO-MCNC: NEGATIVE MG/DL
HCT VFR BLD AUTO: 25.4 % (ref 36.6–50.3)
HGB BLD-MCNC: 7.1 G/DL (ref 12.1–17)
HGB UR QL STRIP: NEGATIVE
IMM GRANULOCYTES # BLD AUTO: 0 K/UL (ref 0–0.04)
IMM GRANULOCYTES NFR BLD AUTO: 0 % (ref 0–0.5)
IRON SATN MFR SERPL: 6 % (ref 20–50)
IRON SERPL-MCNC: 7 UG/DL (ref 35–150)
KETONES UR QL STRIP.AUTO: NEGATIVE MG/DL
LACTATE BLD-SCNC: 1.8 MMOL/L (ref 0.4–2)
LACTATE SERPL-SCNC: 1 MMOL/L (ref 0.4–2)
LACTATE SERPL-SCNC: 3.7 MMOL/L (ref 0.4–2)
LEUKOCYTE ESTERASE UR QL STRIP.AUTO: ABNORMAL
LYMPHOCYTES # BLD: 1.1 K/UL (ref 0.8–3.5)
LYMPHOCYTES NFR BLD: 7 % (ref 12–49)
MAGNESIUM SERPL-MCNC: 2.4 MG/DL (ref 1.6–2.4)
MCH RBC QN AUTO: 23.7 PG (ref 26–34)
MCHC RBC AUTO-ENTMCNC: 28 G/DL (ref 30–36.5)
MCV RBC AUTO: 84.7 FL (ref 80–99)
MONOCYTES # BLD: 0.5 K/UL (ref 0–1)
MONOCYTES NFR BLD: 3 % (ref 5–13)
NEUTS BAND NFR BLD MANUAL: 11 %
NEUTS SEG # BLD: 13.5 K/UL (ref 1.8–8)
NEUTS SEG NFR BLD: 79 % (ref 32–75)
NITRITE UR QL STRIP.AUTO: NEGATIVE
NRBC # BLD: 0.02 K/UL (ref 0–0.01)
NRBC BLD-RTO: 0.1 PER 100 WBC
PH UR STRIP: 6 [PH] (ref 5–8)
PLATELET # BLD AUTO: 624 K/UL (ref 150–400)
PMV BLD AUTO: 9.7 FL (ref 8.9–12.9)
POTASSIUM SERPL-SCNC: 3.9 MMOL/L (ref 3.5–5.1)
PROCALCITONIN SERPL-MCNC: 1.43 NG/ML
PROT SERPL-MCNC: 7.7 G/DL (ref 6.4–8.2)
PROT UR STRIP-MCNC: ABNORMAL MG/DL
RBC # BLD AUTO: 3 M/UL (ref 4.1–5.7)
RBC #/AREA URNS HPF: ABNORMAL /HPF (ref 0–5)
RBC MORPH BLD: ABNORMAL
RBC MORPH BLD: ABNORMAL
SERVICE CMNT-IMP: NORMAL
SODIUM SERPL-SCNC: 134 MMOL/L (ref 136–145)
SP GR UR REFRACTOMETRY: 1.02 (ref 1–1.03)
TIBC SERPL-MCNC: 108 UG/DL (ref 250–450)
UA: UC IF INDICATED,UAUC: ABNORMAL
UROBILINOGEN UR QL STRIP.AUTO: >8 EU/DL (ref 0.2–1)
VIT B12 SERPL-MCNC: 1716 PG/ML (ref 193–986)
WBC # BLD AUTO: 15.1 K/UL (ref 4.1–11.1)
WBC URNS QL MICRO: ABNORMAL /HPF (ref 0–4)

## 2022-01-26 PROCEDURE — 82962 GLUCOSE BLOOD TEST: CPT

## 2022-01-26 PROCEDURE — 65660000001 HC RM ICU INTERMED STEPDOWN

## 2022-01-26 PROCEDURE — 74011250636 HC RX REV CODE- 250/636: Performed by: EMERGENCY MEDICINE

## 2022-01-26 PROCEDURE — 87077 CULTURE AEROBIC IDENTIFY: CPT

## 2022-01-26 PROCEDURE — 80053 COMPREHEN METABOLIC PANEL: CPT

## 2022-01-26 PROCEDURE — 81001 URINALYSIS AUTO W/SCOPE: CPT

## 2022-01-26 PROCEDURE — 86923 COMPATIBILITY TEST ELECTRIC: CPT

## 2022-01-26 PROCEDURE — 74011000636 HC RX REV CODE- 636: Performed by: STUDENT IN AN ORGANIZED HEALTH CARE EDUCATION/TRAINING PROGRAM

## 2022-01-26 PROCEDURE — 87086 URINE CULTURE/COLONY COUNT: CPT

## 2022-01-26 PROCEDURE — 74011000258 HC RX REV CODE- 258: Performed by: STUDENT IN AN ORGANIZED HEALTH CARE EDUCATION/TRAINING PROGRAM

## 2022-01-26 PROCEDURE — 87040 BLOOD CULTURE FOR BACTERIA: CPT

## 2022-01-26 PROCEDURE — 84145 PROCALCITONIN (PCT): CPT

## 2022-01-26 PROCEDURE — 74011250636 HC RX REV CODE- 250/636: Performed by: NURSE PRACTITIONER

## 2022-01-26 PROCEDURE — 87205 SMEAR GRAM STAIN: CPT

## 2022-01-26 PROCEDURE — 30233N1 TRANSFUSION OF NONAUTOLOGOUS RED BLOOD CELLS INTO PERIPHERAL VEIN, PERCUTANEOUS APPROACH: ICD-10-PCS | Performed by: GENERAL ACUTE CARE HOSPITAL

## 2022-01-26 PROCEDURE — 87147 CULTURE TYPE IMMUNOLOGIC: CPT

## 2022-01-26 PROCEDURE — 74011250637 HC RX REV CODE- 250/637: Performed by: STUDENT IN AN ORGANIZED HEALTH CARE EDUCATION/TRAINING PROGRAM

## 2022-01-26 PROCEDURE — 82607 VITAMIN B-12: CPT

## 2022-01-26 PROCEDURE — 83605 ASSAY OF LACTIC ACID: CPT

## 2022-01-26 PROCEDURE — 72193 CT PELVIS W/DYE: CPT

## 2022-01-26 PROCEDURE — 82728 ASSAY OF FERRITIN: CPT

## 2022-01-26 PROCEDURE — 74011250636 HC RX REV CODE- 250/636: Performed by: STUDENT IN AN ORGANIZED HEALTH CARE EDUCATION/TRAINING PROGRAM

## 2022-01-26 PROCEDURE — 96365 THER/PROPH/DIAG IV INF INIT: CPT

## 2022-01-26 PROCEDURE — 82746 ASSAY OF FOLIC ACID SERUM: CPT

## 2022-01-26 PROCEDURE — 11042 DBRDMT SUBQ TIS 1ST 20SQCM/<: CPT | Performed by: SURGERY

## 2022-01-26 PROCEDURE — 36415 COLL VENOUS BLD VENIPUNCTURE: CPT

## 2022-01-26 PROCEDURE — 85025 COMPLETE CBC W/AUTO DIFF WBC: CPT

## 2022-01-26 PROCEDURE — 83735 ASSAY OF MAGNESIUM: CPT

## 2022-01-26 PROCEDURE — 74011250636 HC RX REV CODE- 250/636: Performed by: GENERAL ACUTE CARE HOSPITAL

## 2022-01-26 PROCEDURE — 86900 BLOOD TYPING SEROLOGIC ABO: CPT

## 2022-01-26 PROCEDURE — 74011250637 HC RX REV CODE- 250/637: Performed by: GENERAL ACUTE CARE HOSPITAL

## 2022-01-26 PROCEDURE — 96375 TX/PRO/DX INJ NEW DRUG ADDON: CPT

## 2022-01-26 PROCEDURE — 74011250637 HC RX REV CODE- 250/637: Performed by: EMERGENCY MEDICINE

## 2022-01-26 PROCEDURE — 87186 SC STD MICRODIL/AGAR DIL: CPT

## 2022-01-26 PROCEDURE — 74011000258 HC RX REV CODE- 258: Performed by: EMERGENCY MEDICINE

## 2022-01-26 PROCEDURE — 74011000250 HC RX REV CODE- 250: Performed by: STUDENT IN AN ORGANIZED HEALTH CARE EDUCATION/TRAINING PROGRAM

## 2022-01-26 PROCEDURE — 93005 ELECTROCARDIOGRAM TRACING: CPT

## 2022-01-26 PROCEDURE — 86140 C-REACTIVE PROTEIN: CPT

## 2022-01-26 PROCEDURE — 83540 ASSAY OF IRON: CPT

## 2022-01-26 RX ORDER — ONDANSETRON 4 MG/1
4 TABLET, ORALLY DISINTEGRATING ORAL
Status: DISCONTINUED | OUTPATIENT
Start: 2022-01-26 | End: 2022-02-02 | Stop reason: HOSPADM

## 2022-01-26 RX ORDER — ONDANSETRON 2 MG/ML
4 INJECTION INTRAMUSCULAR; INTRAVENOUS
Status: DISCONTINUED | OUTPATIENT
Start: 2022-01-26 | End: 2022-02-02 | Stop reason: HOSPADM

## 2022-01-26 RX ORDER — SODIUM CHLORIDE 0.9 % (FLUSH) 0.9 %
5-40 SYRINGE (ML) INJECTION AS NEEDED
Status: DISCONTINUED | OUTPATIENT
Start: 2022-01-26 | End: 2022-02-02 | Stop reason: HOSPADM

## 2022-01-26 RX ORDER — METRONIDAZOLE 250 MG/1
500 TABLET ORAL 3 TIMES DAILY
Status: DISCONTINUED | OUTPATIENT
Start: 2022-01-26 | End: 2022-01-31

## 2022-01-26 RX ORDER — FENTANYL CITRATE 50 UG/ML
50 INJECTION, SOLUTION INTRAMUSCULAR; INTRAVENOUS
Status: COMPLETED | OUTPATIENT
Start: 2022-01-26 | End: 2022-01-26

## 2022-01-26 RX ORDER — KETOROLAC TROMETHAMINE 30 MG/ML
15 INJECTION, SOLUTION INTRAMUSCULAR; INTRAVENOUS
Status: COMPLETED | OUTPATIENT
Start: 2022-01-26 | End: 2022-01-26

## 2022-01-26 RX ORDER — OXYCODONE HYDROCHLORIDE 5 MG/1
5 TABLET ORAL
Status: DISCONTINUED | OUTPATIENT
Start: 2022-01-26 | End: 2022-01-28

## 2022-01-26 RX ORDER — HYDROMORPHONE HYDROCHLORIDE 1 MG/ML
1 INJECTION, SOLUTION INTRAMUSCULAR; INTRAVENOUS; SUBCUTANEOUS ONCE
Status: COMPLETED | OUTPATIENT
Start: 2022-01-26 | End: 2022-01-26

## 2022-01-26 RX ORDER — POLYETHYLENE GLYCOL 3350 17 G/17G
17 POWDER, FOR SOLUTION ORAL DAILY PRN
Status: DISCONTINUED | OUTPATIENT
Start: 2022-01-26 | End: 2022-02-02 | Stop reason: HOSPADM

## 2022-01-26 RX ORDER — SODIUM CHLORIDE 9 MG/ML
100 INJECTION, SOLUTION INTRAVENOUS CONTINUOUS
Status: DISCONTINUED | OUTPATIENT
Start: 2022-01-26 | End: 2022-01-28

## 2022-01-26 RX ORDER — SODIUM CHLORIDE 0.9 % (FLUSH) 0.9 %
5-40 SYRINGE (ML) INJECTION EVERY 8 HOURS
Status: DISCONTINUED | OUTPATIENT
Start: 2022-01-26 | End: 2022-02-02 | Stop reason: HOSPADM

## 2022-01-26 RX ORDER — OXYCODONE HYDROCHLORIDE 5 MG/1
10 TABLET ORAL
Status: COMPLETED | OUTPATIENT
Start: 2022-01-26 | End: 2022-01-26

## 2022-01-26 RX ORDER — ACETAMINOPHEN 650 MG/1
650 SUPPOSITORY RECTAL
Status: DISCONTINUED | OUTPATIENT
Start: 2022-01-26 | End: 2022-01-28

## 2022-01-26 RX ORDER — ACETAMINOPHEN 325 MG/1
650 TABLET ORAL
Status: DISCONTINUED | OUTPATIENT
Start: 2022-01-26 | End: 2022-01-26 | Stop reason: SDUPTHER

## 2022-01-26 RX ORDER — MORPHINE SULFATE 2 MG/ML
1 INJECTION, SOLUTION INTRAMUSCULAR; INTRAVENOUS
Status: DISCONTINUED | OUTPATIENT
Start: 2022-01-26 | End: 2022-01-28

## 2022-01-26 RX ORDER — ACETAMINOPHEN 500 MG
1000 TABLET ORAL
Status: COMPLETED | OUTPATIENT
Start: 2022-01-26 | End: 2022-01-26

## 2022-01-26 RX ORDER — ONDANSETRON 2 MG/ML
4 INJECTION INTRAMUSCULAR; INTRAVENOUS
Status: DISCONTINUED | OUTPATIENT
Start: 2022-01-26 | End: 2022-01-26 | Stop reason: SDUPTHER

## 2022-01-26 RX ORDER — ACETAMINOPHEN 325 MG/1
650 TABLET ORAL
Status: DISCONTINUED | OUTPATIENT
Start: 2022-01-26 | End: 2022-01-28

## 2022-01-26 RX ADMIN — OXYCODONE 5 MG: 5 TABLET ORAL at 20:19

## 2022-01-26 RX ADMIN — SODIUM CHLORIDE 500 ML: 9 INJECTION, SOLUTION INTRAVENOUS at 21:42

## 2022-01-26 RX ADMIN — METRONIDAZOLE 500 MG: 250 TABLET ORAL at 21:40

## 2022-01-26 RX ADMIN — METRONIDAZOLE 500 MG: 250 TABLET ORAL at 16:29

## 2022-01-26 RX ADMIN — KETOROLAC TROMETHAMINE 15 MG: 30 INJECTION, SOLUTION INTRAMUSCULAR; INTRAVENOUS at 02:53

## 2022-01-26 RX ADMIN — CEFEPIME HYDROCHLORIDE 1 G: 1 INJECTION, POWDER, FOR SOLUTION INTRAMUSCULAR; INTRAVENOUS at 09:01

## 2022-01-26 RX ADMIN — IOPAMIDOL 100 ML: 755 INJECTION, SOLUTION INTRAVENOUS at 03:09

## 2022-01-26 RX ADMIN — VANCOMYCIN HYDROCHLORIDE 750 MG: 750 INJECTION, POWDER, LYOPHILIZED, FOR SOLUTION INTRAVENOUS at 18:20

## 2022-01-26 RX ADMIN — SODIUM CHLORIDE 100 ML/HR: 9 INJECTION, SOLUTION INTRAVENOUS at 16:33

## 2022-01-26 RX ADMIN — SODIUM CHLORIDE, POTASSIUM CHLORIDE, SODIUM LACTATE AND CALCIUM CHLORIDE 293 ML: 600; 310; 30; 20 INJECTION, SOLUTION INTRAVENOUS at 01:47

## 2022-01-26 RX ADMIN — SODIUM CHLORIDE 100 ML/HR: 9 INJECTION, SOLUTION INTRAVENOUS at 03:51

## 2022-01-26 RX ADMIN — SODIUM CHLORIDE, PRESERVATIVE FREE 10 ML: 5 INJECTION INTRAVENOUS at 21:45

## 2022-01-26 RX ADMIN — OXYCODONE 5 MG: 5 TABLET ORAL at 12:02

## 2022-01-26 RX ADMIN — ACETAMINOPHEN 1000 MG: 500 TABLET ORAL at 15:05

## 2022-01-26 RX ADMIN — VANCOMYCIN HYDROCHLORIDE 1000 MG: 1 INJECTION, POWDER, LYOPHILIZED, FOR SOLUTION INTRAVENOUS at 07:25

## 2022-01-26 RX ADMIN — HYDROMORPHONE HYDROCHLORIDE 1 MG: 1 INJECTION, SOLUTION INTRAMUSCULAR; INTRAVENOUS; SUBCUTANEOUS at 14:39

## 2022-01-26 RX ADMIN — OXYCODONE 10 MG: 5 TABLET ORAL at 02:53

## 2022-01-26 RX ADMIN — ACETAMINOPHEN 650 MG: 325 TABLET ORAL at 07:39

## 2022-01-26 RX ADMIN — METRONIDAZOLE 500 MG: 250 TABLET ORAL at 07:40

## 2022-01-26 RX ADMIN — SODIUM CHLORIDE, POTASSIUM CHLORIDE, SODIUM LACTATE AND CALCIUM CHLORIDE 1000 ML: 600; 310; 30; 20 INJECTION, SOLUTION INTRAVENOUS at 01:36

## 2022-01-26 RX ADMIN — FENTANYL CITRATE 50 MCG: 50 INJECTION INTRAMUSCULAR; INTRAVENOUS at 01:35

## 2022-01-26 RX ADMIN — CEFEPIME HYDROCHLORIDE 2 G: 2 INJECTION, POWDER, FOR SOLUTION INTRAVENOUS at 01:36

## 2022-01-26 RX ADMIN — CEFEPIME HYDROCHLORIDE 1 G: 1 INJECTION, POWDER, FOR SOLUTION INTRAMUSCULAR; INTRAVENOUS at 21:41

## 2022-01-26 RX ADMIN — Medication 1 CAPSULE: at 09:01
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
## 2022-01-26 NOTE — PROGRESS NOTES
Procedure Note    Encounter Date: 1/25/2022    Pre-operative diagnosis: Right greater trochanter pressure wound  Post-operative diagnosis:  same  Procedure: Sharp debridement with scalpel of necrotic skin subcutaneous adipose and fascia     Time out at performed by me (see consent form):  * Patient was identified by name and date of birth   * Agreement on procedure being performed was verified  * Risks and Benefits explained to the patient  * Procedure site verified and marked as necessary  * Patient was positioned for comfort  *Verbal consent    Procedure Details: The risks,benefits and alternatives were explained and consent was obtained for the procedure. Time out was performed. The area was prepped in the usual manner. The patient is insensate in the area of the wound. Forceps and a scalpel were used to excise nonviable skin, subcutaneous adipose, and fascia. This was taken down to the level of the muscle. A culture was taken and sent. Tissues in that level were bleeding and therefore appeared viable. The wound was packed with sterile gauze covered with sterile gauze and an ABD and taped. The procedure was well-tolerated. Estimated Blood Loss:  minimal     Disposition:  Procedure well tolerated. Patient complained of pain in his lower leg that was present before the procedure as well. Plan: Would do broad-spectrum antibiotics until the culture data is back. We will change the dressing tomorrow.     Signed By: Elana Mcginnis MD

## 2022-01-26 NOTE — PROGRESS NOTES
Called back for bleeding at debridement site. On exam there is ooze from primarily one spot. This was oversewn with two 3-0 Vicryl's. There was a tiny bit of ooze diffusely and this was covered with Surgicel and then redressed. Good hemostasis was noted. Will change dressing tomorrow.

## 2022-01-26 NOTE — PROGRESS NOTES
TRANSFER - IN REPORT:    Verbal report received from [facility] (name) on Ciara Hsieh  being received from ED(unit) for routine progression of care      Report consisted of patients Situation, Background, Assessment and   Recommendations(SBAR). Information from the following report(s) SBAR, Kardex, ED Summary, OR Summary, Procedure Summary, MAR, Recent Results and Cardiac Rhythm Sinus Tach was reviewed with the receiving nurse. Opportunity for questions and clarification was provided. Assessment completed upon patients arrival to unit and care assumed.

## 2022-01-26 NOTE — PROGRESS NOTES
Pharmacy Antimicrobial Kinetic Dosing    Indication for Antimicrobials: SSTI  / Sepsis / UTI    Current Regimen of Each Antimicrobial:  Vancomycin, pharmacy to dose (Start Date ; Day # )  Metronidazole 500mg PO TID (; day )  Cefepime 1g IV q8h (start ; day )    Previous Antimicrobial Therapy:    Goal Level: AUC: 400-600 mg/hr/Liter/day    Date Dose & Interval Measured (mcg/mL) Predicted AUC/PEGGY                       Date & time of next level:  @ 0800    Dosing calculator used: [facility] [email or web address]    Significant Positive Cultures:    bcx   bcx   ucx   wound cx    Conditions for Dosing Consideration: Paralysis    Labs:  Recent Labs     22  2323   CREA 0.44*   BUN 11     Recent Labs     22  2323   WBC 16.7*     Temp (24hrs), Av.6 °F (37 °C), Min:98.3 °F (36.8 °C), Max:98.8 °F (37.1 °C)    Creatinine Clearance (mL/min):   CrCl (Ideal Body Weight): 177.1   If actual weight < IBW: CrCl (Actual Body Weight) 98.3    Impression/Plan:   Continue cefepime as ordered  Continue metronidazole as ordered  Will give patient vancomycin 1g IV x 1 followed by vancomycin 750mg IV q8h to give an estimated . Vanc trough  @ 0800  BMP in AM  Antimicrobial stop date 5 days     Pharmacy will follow daily and adjust medications as appropriate for renal function and/or serum levels.     Thank you,  [facility] Juan Gould PHARMD    Vancomycin Dosing Document    Documents located on pharmacy Teams site: Clinical Practice -> Antimicrobial Stewardship -> Antibiotics_Vancomycin     Aminoglycoside Dosing Document    Documents located on pharmacy Teams site: Clinical Practice -> Antimicrobial Stewardship -> Antibiotics_Aminoglycosides

## 2022-01-26 NOTE — PROGRESS NOTES
Rapid Response called for HR sustained in 180s, patient shivering s/p wound debridement by General Surgery. Rapid Response RN and Dr. Charles Patel at bedside. 12 lead EKG obtained. Temp found at 103.2, given 1 g PO Tylenol. Repeat paired Blood Cx sent, lactic, procal, CBC, CMP, CRP, T&S, & mag. Patient BP stable. Patient then found to be bleeding profusely from wound debridement site, pressure held and Dr. Yajaira georges who placed 2 sutures to control bleeding and applied hemostatic dsg. Patient HR improved to 130s-140s. Patient refused order for malcolm catheter placement, Dr. Charles Patel aware.
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
## 2022-01-26 NOTE — ROUTINE PROCESS
TRANSFER - OUT REPORT:    Verbal report given to Kaylan Dutton RN (name) on Miriam Frias  being transferred to [facility] (unit) for routine progression of care       Report consisted of patients Situation, Background, Assessment and   Recommendations(SBAR). Information from the following report(s) SBAR, MAR, Recent Results and Cardiac Rhythm ST was reviewed with the receiving nurse. Lines:   Peripheral IV 01/26/22 Anterior;Proximal;Right Forearm (Active)   Site Assessment Clean, dry, & intact 01/26/22 0728   Phlebitis Assessment 0 01/26/22 0728   Infiltration Assessment 0 01/26/22 0728   Dressing Status Clean, dry, & intact 01/26/22 0728   Dressing Type Transparent 01/26/22 0728   Hub Color/Line Status Pink; Infusing;Patent 01/26/22 0728       Peripheral IV 01/26/22 Anterior;Right Forearm (Active)   Site Assessment Clean, dry, & intact 01/26/22 0728   Phlebitis Assessment 0 01/26/22 0728   Infiltration Assessment 0 01/26/22 0728   Dressing Status Clean, dry, & intact 01/26/22 0728   Dressing Type Transparent 01/26/22 0728   Hub Color/Line Status Pink 01/26/22 1209        Opportunity for questions and clarification was provided.       Patient transported with:   [employer]

## 2022-01-26 NOTE — REMOTE MONITORING
Spoke with primary RN Buffy regarding 3 and 6 hour Sepsis bundle.     Wayne Cobb, [address]  Callback # [phone]

## 2022-01-26 NOTE — ED NOTES
Bedside shift change report given to 1710 Jan Galvan (oncoming nurse) by Dhara Mckeon (offgoing nurse). Report included the following information SBAR, ED Summary, MAR and Recent Results.

## 2022-01-26 NOTE — PROGRESS NOTES
Pharmacy Antimicrobial Kinetic Dosing    Indication for Antimicrobials: SSTI  / Sepsis / UTI    Current Regimen of Each Antimicrobial:  Vancomycin, pharmacy to dose (Start Date ; Day # )  Metronidazole 500mg PO TID (; day )  Cefepime 1g IV q8h (start ; day )    Previous Antimicrobial Therapy:    Goal Level: AUC: 400-600 mg/hr/Liter/day    Date Dose & Interval Measured (mcg/mL) Predicted AUC/PEGGY                       Date & time of next level:     Dosing calculator used: [facility] calculator    Significant Positive Cultures:    bcx   bcx   ucx   wound cx    Conditions for Dosing Consideration: Paralysis    Labs:  Recent Labs     22  2323   CREA 0.44*   BUN 11     Recent Labs     22  2323   WBC 16.7*     Temp (24hrs), Av.3 °F (36.8 °C), Min:98.3 °F (36.8 °C), Max:98.3 °F (36.8 °C)        Creatinine Clearance (mL/min):   CrCl (Ideal Body Weight): 177.1   If actual weight < IBW: CrCl (Actual Body Weight) 98.3    Impression/Plan:   Continue cefepime as ordered  Continue metronidazole as ordered  Will give patient vancomycin 1g IV x 1 followed by vancomycin 750mg IV q8h to give an estimated . BMP in AM  Antimicrobial stop date 5 days     Pharmacy will follow daily and adjust medications as appropriate for renal function and/or serum levels.     Thank you,  Marily Gusman, PHARMD    Vancomycin Dosing Document    Documents located on pharmacy Teams site: Clinical Practice -> Antimicrobial Stewardship -> Antibiotics_Vancomycin     Aminoglycoside Dosing Document    Documents located on pharmacy Teams site: Clinical Practice -> Antimicrobial Stewardship -> Antibiotics_Aminoglycosides

## 2022-01-26 NOTE — PROGRESS NOTES
RAPID RESPONSE TEAM    Responded to RRT in ED 38 for elevated HR into the 180s. Upon arrival, patient's HR was in the 170-180s, EKG and BP were obtained after several attempts due to patient having rigors/chills. Skin very warm to touch - temperature was 103. 2F and blood sugar was obtained - 70mg/dL. S/P surgical debridement by General Surgery. Patient was given Dilaudid about 15 minutes prior per nurse. BP stable and patient not in acute distress, not symptomatic. Patient Vitals for the past 4 hrs:   Temp Pulse Resp BP SpO2   01/26/22 1508 -- -- -- 121/72 --   01/26/22 1506 (!) 103.2 °F (39.6 °C) (!) 175 18 121/72 100 %         Dr. Isis Patel came to the bedside. MD ordered:  - Tylenol 1000mg   - Orange Juice 240 cc for BS of 70  - Labs (Lactic Acid, CBC, blood cultures, CRP, CMP,T/S, CRP, MAG, and PROCAL). - ECHO  - Patient already had UA (+UTI) and blood culture - already treated with antibiotics. Of Note: At 1555 received another call from primary nurse - patient was experiencing bleeding from wound debridement site. Advised to call [phone] provider and RRT went to bedside. Primary RN was holding pressure, per nurse \" site was squirting blood\". General Surgery MD at bedside - placed sutures and applied Surgicell. CBC resulted H/H of 7.1/25.4, MD had already ordered T/S. HR in the 150s, other VSS. Level of care upgraded to stepdown status, awaiting bed. 1850 - HR 110s - improved from earlier. MD ordered 500cc NS bolus per chart.     Plan:  -- Manage pain and treat fever  -- Monitor for signs bleeding  -- Treat infection     Romina Pantoja  Rapid Response RN  Ext [phone]

## 2022-01-27 ENCOUNTER — APPOINTMENT (OUTPATIENT)
Dept: NON INVASIVE DIAGNOSTICS | Age: 27
DRG: 720 | End: 2022-01-27
Attending: GENERAL ACUTE CARE HOSPITAL
Payer: MEDICAID

## 2022-01-27 LAB
ANION GAP SERPL CALC-SCNC: 7 MMOL/L (ref 5–15)
ATRIAL RATE: 187 BPM
BUN SERPL-MCNC: 8 MG/DL (ref 6–20)
BUN/CREAT SERPL: 21 (ref 12–20)
CALCIUM SERPL-MCNC: 8 MG/DL (ref 8.5–10.1)
CALCULATED R AXIS, ECG10: 66 DEGREES
CALCULATED T AXIS, ECG11: 62 DEGREES
CHLORIDE SERPL-SCNC: 107 MMOL/L (ref 97–108)
CO2 SERPL-SCNC: 23 MMOL/L (ref 21–32)
CREAT SERPL-MCNC: 0.38 MG/DL (ref 0.7–1.3)
DATE LAST DOSE: NORMAL
DIAGNOSIS, 93000: NORMAL
ECHO AO ROOT DIAM: 2.7 CM
ECHO AO ROOT INDEX: 1.74 CM/M2
ECHO AV AREA PEAK VELOCITY: 2.5 CM2
ECHO AV AREA VTI: 2.6 CM2
ECHO AV AREA VTI: 2.6 CM2
ECHO AV MEAN GRADIENT: 5 MMHG
ECHO AV MEAN VELOCITY: 1 M/S
ECHO AV PEAK GRADIENT: 10 MMHG
ECHO AV PEAK GRADIENT: 11 MMHG
ECHO AV PEAK VELOCITY: 1.6 M/S
ECHO AV PEAK VELOCITY: 1.6 M/S
ECHO AV VTI: 23 CM
ECHO LA DIAMETER INDEX: 1.87 CM/M2
ECHO LA DIAMETER: 2.9 CM
ECHO LA TO AORTIC ROOT RATIO: 1.07
ECHO LA VOL 2C: 57 ML (ref 18–58)
ECHO LA VOL 4C: 40 ML (ref 18–58)
ECHO LA VOLUME AREA LENGTH: 64 ML
ECHO LA VOLUME INDEX A2C: 37 ML/M2 (ref 16–34)
ECHO LA VOLUME INDEX A4C: 26 ML/M2 (ref 16–34)
ECHO LA VOLUME INDEX AREA LENGTH: 41 ML/M2 (ref 16–34)
ECHO LV E' LATERAL VELOCITY: 25 CM/S
ECHO LV E' SEPTAL VELOCITY: 13 CM/S
ECHO LV FRACTIONAL SHORTENING: 27 % (ref 28–44)
ECHO LV INTERNAL DIMENSION DIASTOLE INDEX: 2.9 CM/M2
ECHO LV INTERNAL DIMENSION DIASTOLIC: 4.5 CM (ref 4.2–5.9)
ECHO LV INTERNAL DIMENSION SYSTOLIC INDEX: 2.13 CM/M2
ECHO LV INTERNAL DIMENSION SYSTOLIC: 3.3 CM
ECHO LV IVSD: 1.1 CM (ref 0.6–1)
ECHO LV IVSS: 1.4 CM
ECHO LV MASS 2D: 153.3 G (ref 88–224)
ECHO LV MASS INDEX 2D: 98.9 G/M2 (ref 49–115)
ECHO LV POSTERIOR WALL DIASTOLIC: 0.9 CM (ref 0.6–1)
ECHO LV POSTERIOR WALL SYSTOLIC: 1.6 CM
ECHO LV RELATIVE WALL THICKNESS RATIO: 0.4
ECHO LVOT AREA: 3.5 CM2
ECHO LVOT AV VTI INDEX: 0.8
ECHO LVOT DIAM: 2.1 CM
ECHO LVOT MEAN GRADIENT: 3 MMHG
ECHO LVOT PEAK GRADIENT: 6 MMHG
ECHO LVOT PEAK GRADIENT: 6 MMHG
ECHO LVOT PEAK VELOCITY: 1.2 M/S
ECHO LVOT PEAK VELOCITY: 1.2 M/S
ECHO LVOT STROKE VOLUME INDEX: 41.1 ML/M2
ECHO LVOT SV: 63.7 ML
ECHO LVOT VTI: 18.4 CM
ECHO MV A VELOCITY: 0.58 M/S
ECHO MV E DECELERATION TIME (DT): 192.8 MS
ECHO MV E VELOCITY: 0.94 M/S
ECHO MV E/A RATIO: 1.62
ECHO MV E/E' LATERAL: 3.76
ECHO MV E/E' RATIO (AVERAGED): 5.5
ECHO MV E/E' SEPTAL: 7.23
ECHO PV MAX VELOCITY: 1.3 M/S
ECHO PV PEAK GRADIENT: 7 MMHG
ECHO RV INTERNAL DIMENSION: 3.9 CM
ERYTHROCYTE [DISTWIDTH] IN BLOOD BY AUTOMATED COUNT: 16.6 % (ref 11.5–14.5)
GLUCOSE SERPL-MCNC: 88 MG/DL (ref 65–100)
HCT VFR BLD AUTO: 23.1 % (ref 36.6–50.3)
HGB BLD-MCNC: 6.6 G/DL (ref 12.1–17)
HISTORY CHECKED?,CKHIST: NORMAL
MCH RBC QN AUTO: 23.7 PG (ref 26–34)
MCHC RBC AUTO-ENTMCNC: 28.6 G/DL (ref 30–36.5)
MCV RBC AUTO: 82.8 FL (ref 80–99)
NRBC # BLD: 0 K/UL (ref 0–0.01)
NRBC BLD-RTO: 0 PER 100 WBC
PLATELET # BLD AUTO: 590 K/UL (ref 150–400)
PMV BLD AUTO: 8.5 FL (ref 8.9–12.9)
POTASSIUM SERPL-SCNC: 3.7 MMOL/L (ref 3.5–5.1)
Q-T INTERVAL, ECG07: 260 MS
QRS DURATION, ECG06: 162 MS
QTC CALCULATION (BEZET), ECG08: 458 MS
RBC # BLD AUTO: 2.79 M/UL (ref 4.1–5.7)
REPORTED DOSE,DOSE: NORMAL UNITS
REPORTED DOSE/TIME,TMG: NORMAL
SODIUM SERPL-SCNC: 137 MMOL/L (ref 136–145)
VANCOMYCIN TROUGH SERPL-MCNC: 6.8 UG/ML (ref 5–10)
VENTRICULAR RATE, ECG03: 187 BPM
WBC # BLD AUTO: 16.4 K/UL (ref 4.1–11.1)

## 2022-01-27 PROCEDURE — 74011250636 HC RX REV CODE- 250/636: Performed by: INTERNAL MEDICINE

## 2022-01-27 PROCEDURE — 74011250636 HC RX REV CODE- 250/636: Performed by: STUDENT IN AN ORGANIZED HEALTH CARE EDUCATION/TRAINING PROGRAM

## 2022-01-27 PROCEDURE — 36415 COLL VENOUS BLD VENIPUNCTURE: CPT

## 2022-01-27 PROCEDURE — 74011000250 HC RX REV CODE- 250: Performed by: GENERAL ACUTE CARE HOSPITAL

## 2022-01-27 PROCEDURE — 99231 SBSQ HOSP IP/OBS SF/LOW 25: CPT | Performed by: SURGERY

## 2022-01-27 PROCEDURE — 80048 BASIC METABOLIC PNL TOTAL CA: CPT

## 2022-01-27 PROCEDURE — 74011000258 HC RX REV CODE- 258: Performed by: STUDENT IN AN ORGANIZED HEALTH CARE EDUCATION/TRAINING PROGRAM

## 2022-01-27 PROCEDURE — 85027 COMPLETE CBC AUTOMATED: CPT

## 2022-01-27 PROCEDURE — P9016 RBC LEUKOCYTES REDUCED: HCPCS

## 2022-01-27 PROCEDURE — 74011250636 HC RX REV CODE- 250/636: Performed by: GENERAL ACUTE CARE HOSPITAL

## 2022-01-27 PROCEDURE — 74011250637 HC RX REV CODE- 250/637: Performed by: GENERAL ACUTE CARE HOSPITAL

## 2022-01-27 PROCEDURE — 36430 TRANSFUSION BLD/BLD COMPNT: CPT

## 2022-01-27 PROCEDURE — 93306 TTE W/DOPPLER COMPLETE: CPT

## 2022-01-27 PROCEDURE — 74011250637 HC RX REV CODE- 250/637: Performed by: STUDENT IN AN ORGANIZED HEALTH CARE EDUCATION/TRAINING PROGRAM

## 2022-01-27 PROCEDURE — 2709999900 HC NON-CHARGEABLE SUPPLY

## 2022-01-27 PROCEDURE — 77030033032 HC DRSG MAXORB AG MDII -A

## 2022-01-27 PROCEDURE — 74011000250 HC RX REV CODE- 250: Performed by: STUDENT IN AN ORGANIZED HEALTH CARE EDUCATION/TRAINING PROGRAM

## 2022-01-27 PROCEDURE — 80202 ASSAY OF VANCOMYCIN: CPT

## 2022-01-27 PROCEDURE — 65660000001 HC RM ICU INTERMED STEPDOWN

## 2022-01-27 PROCEDURE — 74011000258 HC RX REV CODE- 258: Performed by: GENERAL ACUTE CARE HOSPITAL

## 2022-01-27 RX ORDER — SODIUM CHLORIDE 9 MG/ML
250 INJECTION, SOLUTION INTRAVENOUS AS NEEDED
Status: DISCONTINUED | OUTPATIENT
Start: 2022-01-27 | End: 2022-02-02 | Stop reason: HOSPADM

## 2022-01-27 RX ADMIN — FOLIC ACID: 5 INJECTION, SOLUTION INTRAMUSCULAR; INTRAVENOUS; SUBCUTANEOUS at 12:08

## 2022-01-27 RX ADMIN — VANCOMYCIN HYDROCHLORIDE 750 MG: 750 INJECTION, POWDER, LYOPHILIZED, FOR SOLUTION INTRAVENOUS at 09:45

## 2022-01-27 RX ADMIN — SODIUM CHLORIDE, PRESERVATIVE FREE 10 ML: 5 INJECTION INTRAVENOUS at 14:12

## 2022-01-27 RX ADMIN — OXYCODONE 5 MG: 5 TABLET ORAL at 14:21

## 2022-01-27 RX ADMIN — VANCOMYCIN HYDROCHLORIDE 1000 MG: 1 INJECTION, POWDER, LYOPHILIZED, FOR SOLUTION INTRAVENOUS at 16:59

## 2022-01-27 RX ADMIN — MORPHINE SULFATE 1 MG: 2 INJECTION, SOLUTION INTRAMUSCULAR; INTRAVENOUS at 05:05

## 2022-01-27 RX ADMIN — VANCOMYCIN HYDROCHLORIDE 750 MG: 750 INJECTION, POWDER, LYOPHILIZED, FOR SOLUTION INTRAVENOUS at 00:06

## 2022-01-27 RX ADMIN — CEFEPIME HYDROCHLORIDE 1 G: 1 INJECTION, POWDER, FOR SOLUTION INTRAMUSCULAR; INTRAVENOUS at 21:30

## 2022-01-27 RX ADMIN — ACETAMINOPHEN 650 MG: 325 TABLET ORAL at 18:19

## 2022-01-27 RX ADMIN — SODIUM CHLORIDE, PRESERVATIVE FREE 10 ML: 5 INJECTION INTRAVENOUS at 05:13

## 2022-01-27 RX ADMIN — SODIUM CHLORIDE, PRESERVATIVE FREE 10 ML: 5 INJECTION INTRAVENOUS at 21:30

## 2022-01-27 RX ADMIN — Medication 1 CAPSULE: at 09:46

## 2022-01-27 RX ADMIN — ACETAMINOPHEN 650 MG: 325 TABLET ORAL at 10:06

## 2022-01-27 RX ADMIN — METRONIDAZOLE 500 MG: 250 TABLET ORAL at 09:45

## 2022-01-27 RX ADMIN — OXYCODONE 5 MG: 5 TABLET ORAL at 07:02

## 2022-01-27 RX ADMIN — MORPHINE SULFATE 1 MG: 2 INJECTION, SOLUTION INTRAMUSCULAR; INTRAVENOUS at 17:15

## 2022-01-27 RX ADMIN — METRONIDAZOLE 500 MG: 250 TABLET ORAL at 18:19

## 2022-01-27 RX ADMIN — CEFEPIME HYDROCHLORIDE 1 G: 1 INJECTION, POWDER, FOR SOLUTION INTRAMUSCULAR; INTRAVENOUS at 05:12

## 2022-01-27 RX ADMIN — SODIUM CHLORIDE 100 ML/HR: 9 INJECTION, SOLUTION INTRAVENOUS at 00:06

## 2022-01-27 RX ADMIN — CEFEPIME HYDROCHLORIDE 1 G: 1 INJECTION, POWDER, FOR SOLUTION INTRAMUSCULAR; INTRAVENOUS at 14:11

## 2022-01-27 RX ADMIN — MORPHINE SULFATE 1 MG: 2 INJECTION, SOLUTION INTRAMUSCULAR; INTRAVENOUS at 00:06

## 2022-01-27 NOTE — PROGRESS NOTES
End of Shift Note    Bedside shift change report given to Ashly MARQUEZ (oncoming nurse) by Dallas Ahumada (offgoing nurse). Report included the following information SBAR, Kardex, ED Summary, OR Summary, MAR, Recent Results and Cardiac Rhythm SInus Tach    Shift worked:  [id number]     Shift summary and any significant changes:    Patient transfer from ED. Lactic Acid 3.7, MD notified. Order place for NS bolus d/t soft BP   Wound debridement completed. Sutures place d/t bleeding. ABD pad added to right hip dressing d/t drainage. Concerns for physician to address: Patient stated he is ok with a malcolm but does not want a condom cath placed.       Zone phone for oncoming shift:   [id number] [address] Michael Richelle

## 2022-01-27 NOTE — PROGRESS NOTES
Comprehensive Nutrition Assessment    Type and Reason for Visit: Initial,Wound (& BMI)    Nutrition Recommendations/Plan:   Continue regular diet  Ensure enlive TID  Please document % meals and supplements consumed in flowsheet I/O's under intake     Nutrition Assessment:      Chart reviewed for low BMI and auto-referral for documented wounds. Pt noted for UTI, severe sepsis, stage IV sacral decubitus ulcers, stage IV decubitus ulcer on amputee knee stump, paraplegia (WC bound), BLE amputation (AKAs vs BKAs?), normocytic anemia. S/p sharp debridement of sacral wound on admission. Pt reports a decreased appetite PTA. He typically eats just once per day of whatever his girlfriend cooks, she brought in sandwiches for him today but he hadn't eaten yet. He has not been using any protein/nutrition supplements, but is interesting in starting them while in house. Pt unsure of his recent weight trends, and current weight is pt stated. Ordered updated scale weight. Continue to encourage intake of meals and supplements for wound healing. Wt Readings from Last 5 Encounters:   01/25/22 43.1 kg (95 lb)   01/08/22 42.2 kg (93 lb)   02/22/20 44.6 kg (98 lb 5 oz)   10/27/19 48.5 kg (107 lb)   02/13/19 46.7 kg (103 lb)   ]  Wt Readings from Last 5 Encounters:   01/25/22 43.1 kg (95 lb)   01/08/22 42.2 kg (93 lb)   02/22/20 44.6 kg (98 lb 5 oz)   10/27/19 48.5 kg (107 lb)   02/13/19 46.7 kg (103 lb)   ]      Malnutrition Assessment:  Malnutrition Status:   Moderate malnutrition    Context:  Acute illness     Findings of the 6 clinical characteristics of malnutrition:   Energy Intake:  7 - 50% or less of est energy requirements for 5 or more days  Weight Loss:  Unable to assess     Body Fat Loss:  1 - Mild body fat loss, Buccal region   Muscle Mass Loss:  1 - Mild muscle mass loss, Temples (temporalis)  Fluid Accumulation:  No significant fluid accumulation,     Strength:  Not performed         Estimated Daily Nutrient Needs:  Energy (kcal): 0473-0440 kcals (30-35 kcals/kg); Weight Used for Energy Requirements: Current  Protein (g): 86g (2.0g/kg); Weight Used for Protein Requirements: Current  Fluid (ml/day): 1400mL; Method Used for Fluid Requirements: 1 ml/kcal      Nutrition Related Findings:  Labs: Hgb 6.6. Meds: cefepime, folvite, risaquad, flagyl, NS, morphine, roxicodone. BM PTA. Wounds:    Multiple,Stage IV       Current Nutrition Therapies:  ADULT DIET Regular  DIET ONE TIME MESSAGE  ADULT ORAL NUTRITION SUPPLEMENT Breakfast, Lunch, Dinner; Standard High Calorie/High Protein  ADULT ORAL NUTRITION SUPPLEMENT Lunch, Dinner; Frozen Supplement    Anthropometric Measures:  · Height:  6' (182.9 cm)  · Current Body Wt:  43.1 kg (95 lb)   · Ideal Body Wt:  178 lbs:  53.4 %   · Adjusted Body Weight:  106.2;  Weight Adjustment for: Amputation   · Adjusted BMI:  14.4    · BMI Category:  Underweight (BMI less than 18.5)       Nutrition Diagnosis:   · Increased nutrient needs related to  (wound healing) as evidenced by  (large stage 4 wounds)      Nutrition Interventions:   Food and/or Nutrient Delivery: Continue current diet,Start oral nutrition supplement  Nutrition Education and Counseling: No recommendations at this time  Coordination of Nutrition Care: Continue to monitor while inpatient,Interdisciplinary rounds    Goals:  PO intake >50% meals and supplements next 3-5 days       Nutrition Monitoring and Evaluation:   Behavioral-Environmental Outcomes: None identified  Food/Nutrient Intake Outcomes: Food and nutrient intake,Supplement intake  Physical Signs/Symptoms Outcomes: Biochemical data,GI status,Weight,Skin,Nutrition focused physical findings    Discharge Planning:    Continue oral nutrition supplement,Continue current diet     Electronically signed by Jj Castillo RD on 1/27/2022 at 2:03 PM    Contact: [facility]-0494

## 2022-01-27 NOTE — PROGRESS NOTES
Pharmacy Antimicrobial Kinetic Dosing    Indication for Antimicrobials: SSTI  / Sepsis / UTI    Current Regimen of Each Antimicrobial:  Vancomycin, pharmacy to dose (Start Date ; Day # )  Metronidazole 500mg PO TID (; day )  Cefepime 1g IV q8h (start ; day )    Previous Antimicrobial Therapy:    Goal Level: AUC: 400-600 mg/hr/Liter/day    Date Dose & Interval Measured (mcg/mL) Predicted AUC/PEGGY     @ 0838 Vanc 750 IV q8h 6.8 356                 Date & time of next level:     Dosing calculator used: [employer] calculator    Significant Positive Cultures:    bcx  Strep in 1 of 2; Gm Pos Cocci in clusters in other of 2 - prelim   bcx   ucx - GNR >100,000 0 prelim   wound cx - light probable Staph aureus- prelim    Conditions for Dosing Consideration: Paralysis - paraplegia    Labs:  Recent Labs     22  0525 22  1520 22  2323   CREA 0.38* 0.57* 0.44*   BUN 8 13 11   PCT  --  1.43  --      Recent Labs     22  0525 22  1520 22  2323   WBC 16.4* 15.1* 16.7*   BANDS  --  11  --      Temp (24hrs), Av.1 °F (37.3 °C), Min:98 °F (36.7 °C), Max:103.2 °F (39.6 °C)    Creatinine Clearance (mL/min):   actual weight < IBW: CrCl (Actual Body Weight) 98.3    Impression/Plan:   Continue cefepime as ordered  Continue metronidazole PO as ordered  Vanc trough low at 6.8 mcg/ml   @ 0800- apparent   Increase Vanc. dose to 1000mg IV q8h for predicted   BMP q AM  Antimicrobial stop date 5 days     Pharmacy will follow daily and adjust medications as appropriate for renal function and/or serum levels.     Thank you,  Moises Flowers, [facility]    Vancomycin Dosing Document    Documents located on pharmacy Teams site: Clinical Practice -> Antimicrobial Stewardship -> Antibiotics_Vancomycin     Aminoglycoside Dosing Document    Documents located on pharmacy Teams site: Clinical Practice -> Antimicrobial Stewardship -> Antibiotics_Aminoglycosides

## 2022-01-27 NOTE — PROGRESS NOTES
Transition of Care Plan:    RUR:14% moderate risk  Disposition: home with [facility]  Follow up appointments:pt needs new PCP and specialists as indicated  DME needed:pt has a w/c  Transportation at Discharge: shree will transport  T[place]ma or [id number] to access home: pt has access       IM Medicare Letter:n/a  Is patient a BCPI-A Bundle:n/a           If yes, was Bundle Letter given?:    Is patient a  and connected with the [place]? If yes, was [employer] transfer form completed and [facility] notified? Caregiver Contact:aunt Daija Fonseca [phone]  Discharge Caregiver contacted prior to discharge? Care Conference needed?:n/a    Reason for Admission:   Sepsis, UTI, Decubitus ulcer of ankle stage 4                    RUR Score: 14% moderate risk                 PCP: First and Last name:   Other, Sade, MD     Name of Practice:    Are you a current patient: Yes/No:    Approximate date of last visit:    Can you participate in a virtual visit if needed:     Do you (patient/family) have any concerns for transition/discharge? no                 Plan for utilizing home health:  Home health for wound care, IV abx at d/c     Current Advanced Directive/Advance Care Plan:    [facility] (ACP) Conversation      Date of Conversation: 1/25/2022  Conducted with: Patient with Decision Making Capacity    Healthcare Decision Maker:   No healthcare decision makers have been documented. Click here to complete [address] including selection of the Healthcare Decision Maker Relationship (ie \"Primary\")    Content/Action Overview:   DECLINED ACP conversation - will revisit periodically   Reviewed DNR/DNI and patient elects Full Code (Attempt Resuscitation)    Length of Voluntary ACP Conversation in minutes:  <16 minutes (Non-Billable)          Transition of Care Plan:  Home with home health for wound care and IV abx    Initial note:  Chart reviewed.   CM met with pt to introduce role, verify demographics, and complete assessment. Pt lives with his girlfriend and her mother in a 1 level home with 0 JENN. He is independent with ADL's and IADL's and transfers without assistance. He has a HH hx but no SNF/IPR. He does not have a PCP but would like assistance with finding one as this will be needed for [facility]. CM asked to leave before completing assessment. CM/nursing to f/u on current address (address listed is the pts home but he is currently staying at his girlfriend's mother address and will be moving), girlfriends contact info, and pharmacy. CM will continue to monitor for d/c needs. Care Management Interventions  PCP Verified by CM:  Yes  Palliative Care Criteria Met (RRAT>21 & CHF Dx)?: No  Transition of Care Consult (CM Consult): Discharge Planning  Support Systems: Spouse/Significant Other,Other Family Member(s)  Discharge Location  Patient Expects to be Discharged to[de-identified] Home with home health     Balaji Austin, MSN  Care Manager  [phone]

## 2022-01-27 NOTE — PROGRESS NOTES
0700: End of Shift Note    Bedside shift change report given to Luz Marina Maki RN (oncoming nurse) by Marcelino Bragg (offgoing nurse). Report included the following information SBAR, Kardex, Intake/Output, MAR, Recent Results and Cardiac Rhythm ST    Shift worked:  [id number]     Shift summary and any significant changes:     wound care done at beginning of shift; pain medication given c/o pain in legs; self straight cathed, Hgb 6.6 messaged NP ordered 1 unit blood     Concerns for physician to address:  blood cultures results     Zone phone for oncoming shift:          Activity:  Activity Level: Bed Rest  Number times ambulated in hallways past shift: 0  Number of times OOB to chair past shift: 0    Cardiac:   Cardiac Monitoring: Yes      Cardiac Rhythm: Sinus Tachy    Access:   Current line(s): PIV     Genitourinary:   Urinary status: self cath    Respiratory:   O2 Device: None (Room air)  Chronic home O2 use?: NO  Incentive spirometer at bedside: NO     GI:     Current diet:  ADULT DIET Regular  DIET ONE TIME MESSAGE  Passing flatus: YES  Tolerating current diet: YES       Pain Management:   Patient states pain is manageable on current regimen: YES    Skin:  Dimitri Score: 11  Interventions: speciality bed, foam dressing, PT/OT consult and internal/external urinary devices    Patient Safety:  Fall Score:  Total Score: 3  Interventions: bed/chair alarm and pt to call before getting OOB  High Fall Risk: Yes    Length of Stay:  Expected LOS: - - -  Actual LOS: 1 [facility]

## 2022-01-27 NOTE — PROGRESS NOTES
01/27/22 0850   Vital Signs   Temp 98.5 °F (36.9 °C)   Temp Source Oral   Pulse (Heart Rate) (!) 129   Heart Rate Source Monitor   Resp Rate 16   O2 Sat (%) 99 %   Level of Consciousness Alert (0)   /74   MAP (Monitor) 84   MAP (Calculated) 87   MEWS Score 3     MEWS score  3 d/t elevated HR. MD notified. Will continue to monitor patient.

## 2022-01-27 NOTE — PROGRESS NOTES
0730 Bedside shift change report given to [address] [place] [address] Cj Ashford (oncoming nurse) by Mackenzie Rice (offgoing nurse). Report included the following information SBAR and Kardex. End of Shift Note    Bedside shift change report given to Ashly MARQUEZ (oncoming nurse) by Sara Lau (offgoing nurse). Report included the following information SBAR and Kardex    Shift worked:  6242-0755     Shift summary and any significant changes:    Consent completed for blood transfusion. Patient received 1 unit PRBC   Supplements added   Patient did not eat well today. He drank an ensure this evening to help prevent stomach upset from taking PO flagyl. Vanc trough was drawn. Vancomycin dose increased to 100mg. Wound care consulted. New wound care orders placed. Patient stated morphine did not help with the pain in his legs. Last dose given at 1715. Tylenol given at 1819. Concerns for physician to address:  None      [facility]   [phone]       Activity:  Activity Level: Bed Rest  Number times ambulated in hallways past shift: 0  Number of times OOB to chair past shift: 0    Cardiac:   Cardiac Monitoring: Yes      Cardiac Rhythm: Sinus Tachy    Access:   Current line(s): PIV     Genitourinary:   Urinary status: self cath    Respiratory:   O2 Device: None (Room air)  Chronic home O2 use?: NO  Incentive spirometer at bedside: NO     GI:     Current diet:  ADULT DIET Regular  DIET ONE TIME MESSAGE  ADULT ORAL NUTRITION SUPPLEMENT Breakfast, Lunch, Dinner; Standard High Calorie/High Protein  ADULT ORAL NUTRITION SUPPLEMENT Lunch, Dinner; Frozen Supplement  Passing flatus: YES  Tolerating current diet: NO       Pain Management:   Patient states pain is manageable on current regimen: YES    Skin:  Dimitri Score: 13  Interventions: speciality bed, float heels, internal/external urinary devices and nutritional support     Patient Safety:  Fall Score:  Total Score: 3  Interventions: bed/chair alarm, assistive device (walker, cane, etc), gripper socks and pt to call before getting OOB  High Fall Risk: Yes    Length of Stay:  Expected LOS: - - -  Actual LOS: [address]

## 2022-01-27 NOTE — PROGRESS NOTES
Denies pain. Mildly tachy. BP stable. Right hip wound with mostly granulated. Central area down to fascia with some non-viable tissue and tunneling. No pus at present. Continue dakin for now. Will eval again tomorrow.
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
## 2022-01-27 NOTE — PROGRESS NOTES
Problem: Pressure Injury - Risk of  Goal: *Prevention of pressure injury  Description: Document Dimitri Scale and appropriate interventions in the flowsheet. Outcome: Progressing Towards Goal  Note: Pressure Injury Interventions:  Sensory Interventions: Assess changes in LOC,Assess need for specialty bed,Float heels,Keep linens dry and wrinkle-free,Maintain/enhance activity level,Minimize linen layers    Moisture Interventions: Absorbent underpads,Minimize layers    Activity Interventions: Pressure redistribution bed/mattress(bed type)    Mobility Interventions: HOB 30 degrees or less,Pressure redistribution bed/mattress (bed type)    Nutrition Interventions: Document food/fluid/supplement intake    Friction and Shear Interventions: Apply protective barrier, creams and emollients,Foam dressings/transparent film/skin sealants,HOB 30 degrees or less,Lift sheet,Minimize layers                Problem: Falls - Risk of  Goal: *Absence of Falls  Description: Document Ezekiel Fall Risk and appropriate interventions in the flowsheet.   Outcome: Progressing Towards Goal  Note: Fall Risk Interventions:  Mobility Interventions: Bed/chair exit alarm,Communicate number of staff needed for ambulation/transfer,Patient to call before getting OOB         Medication Interventions: Bed/chair exit alarm    Elimination Interventions: Bed/chair exit alarm,Call light in reach,Patient to call for help with toileting needs              Problem: Impaired Skin Integrity/Pressure Injury Treatment  Goal: *Improvement of Existing Pressure Injury  Outcome: Progressing Towards Goal

## 2022-01-27 NOTE — REMOTE MONITORING
Attempted to contact primary RN in regards to the sepsis bundle. Thank you! [address] [facility]. Radha Obrien RN, BSN.

## 2022-01-27 NOTE — PROGRESS NOTES
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 Extensive sacral decubitus soft tissue wounds with a large collection of  fluid and air in the right posterior soft tissues. There is sclerosis and  remodeling of the posterior ischii and sacrum. Osteomyelitis is not excluded.     2. Urinary bladder air can be seen with catheterization or infection    Follow Wound cultures, Blood culture & Urine cx  Cont empiric IV ABx- vancomycin, cefepime and Flagyl for now  Cont Probiotic  IP Wound care consulted- following  IP General surgery consulted- following, s/p Bedside Debridement in ER noted  -Case management consulted for the possible needs at home. Cont IV fluids and pain management.     Normocytic anemia  -Hemoglobin 7.8- >7.1->6.6 today AM  Ordered iron profile with ferritin, B12 and folate. transfuse 1 PRBC today as ordered  Pt has consented for blood and blood product transfusions yesterday with Dr Holland Ahumada and again today with me     Code Status: Full code  Surrogate Decision Maker:     DVT Prophylaxis: Lovenox  GI Prophylaxis: not indicated     Baseline: Wheelchair-bound at home    Estimated discharge date:   Barriers: home DMEs, surgical clearance    Recommended Disposition: Home w/Family and HH PT, OT, RN likely      Subjective:     Chief Complaint / Reason for Physician Visit : F/U sepsis, bacteremia, Decubitus ulcers, UTI, Paraplegia  \"I am feeling better today\". Discussed with RN events overnight.      Review of Systems:  Symptom Y/N Comments  Symptom Y/N Comments   Fever/Chills n   Chest Pain n    Poor Appetite n   Edema n    Cough n   Abdominal Pain n    Sputum n   Joint Pain n    SOB/BILLINGS n Pruritis/Rash     Nausea/vomit    Tolerating PT/OT n    Diarrhea    Tolerating Diet y    Constipation    Other       Could NOT obtain due to:      Objective:     VITALS:   Last 24hrs VS reviewed since prior progress note. Most recent are:  Patient Vitals for the past 24 hrs:   Temp Pulse Resp BP SpO2   01/27/22 1037 98.7 °F (37.1 °C) (!) 114 13 (!) 104/51 99 %   01/27/22 1001 -- (!) 114 15 (!) 101/57 --   01/27/22 0850 98.5 °F (36.9 °C) (!) 129 16 114/74 99 %   01/27/22 0303 98.5 °F (36.9 °C) (!) 120 22 102/61 --   01/27/22 0000 98 °F (36.7 °C) (!) 108 14 113/68 --   01/26/22 1924 99.4 °F (37.4 °C) (!) 109 18 (!) 98/56 99 %   01/26/22 1659 99.6 °F (37.6 °C) (!) 130 18 108/64 --   01/26/22 1508 -- -- -- 121/72 --   01/26/22 1506 (!) 103.2 °F (39.6 °C) (!) 175 18 121/72 100 %       Intake/Output Summary (Last 24 hours) at 1/27/2022 1131  Last data filed at 1/27/2022 0536  Gross per 24 hour   Intake --   Output 900 ml   Net -900 ml        I had a face to face encounter and independently examined this patient on 1/27/2022, as outlined below:  PHYSICAL EXAM:  General: WD, WN. Alert, cooperative, no acute distress    EENT:  EOMI. Anicteric sclerae. MMM  Resp:  CTA bilaterally, no wheezing or rales. No accessory muscle use  CV:  Regular  rhythm,  No edema  GI:  Soft, Non distended, Non tender. +Bowel sounds  Neurologic:  Alert and oriented X 3, normal speech,   Psych:   Good insight. Not anxious nor agitated  Skin:  No rashes.   No jaundice    Reviewed most current lab test results and cultures  YES  Reviewed most current radiology test results   YES  Review and summation of old records today    NO  Reviewed patient's current orders and MAR    YES  PMH/SH reviewed - no change compared to H&P  ________________________________________________________________________  Care Plan discussed with:    Comments   Patient x    Family      RN x    Care Manager x    Consultant  x Wound care RN                 x Multidiciplinary team rounds were held today with , nursing, pharmacist and clinical coordinator. Patient's plan of care was discussed; medications were reviewed and discharge planning was addressed. ________________________________________________________________________  Total NON critical care TIME:  36   Minutes    Total CRITICAL CARE TIME Spent:   Minutes non procedure based      Comments   >50% of visit spent in counseling and coordination of care x    ________________________________________________________________________  Jena Escobar MD     Procedures: see electronic medical records for all procedures/Xrays and details which were not copied into this note but were reviewed prior to creation of Plan. LABS:  I reviewed today's most current labs and imaging studies.   Pertinent labs include:  Recent Labs     01/27/22  0525 01/26/22  1520 01/25/22  2323   WBC 16.4* 15.1* 16.7*   HGB 6.6* 7.1* 7.8*   HCT 23.1* 25.4* 26.6*   * 624* 695*     Recent Labs     01/27/22  0525 01/26/22  1520 01/25/22  2323    134* 131*   K 3.7 3.9 3.6    103 99   CO2 23 23 26   GLU 88 83 100   BUN 8 13 11   CREA 0.38* 0.57* 0.44*   CA 8.0* 8.2* 8.5   MG  --  2.4  --    ALB  --  1.6* 1.7*   TBILI  --  0.9 0.9   ALT  --  8* 7*       Signed: Jena Escobar MD

## 2022-01-27 NOTE — PROGRESS NOTES
01/27/22 0303   Vital Signs   Temp 98.5 °F (36.9 °C)   Temp Source Oral   Pulse (Heart Rate) (!) 120   Heart Rate Source Monitor   Resp Rate 22   Level of Consciousness Alert (0)   /61   MAP (Monitor) 74   MAP (Calculated) 75   BP 1 Method Automatic   BP 1 Location Right upper arm   BP Patient Position At rest   MEWS Score 4   Oxygen Therapy   O2 Device None (Room air)   MD aware of increased HR from previous jump

## 2022-01-27 NOTE — PROGRESS NOTES
Received message from patient's nurse stating:    Hgb is 6.6. Discussion / orders:    Transfuse 1 unit packed red blood cells           Please note that this note was dictated using Dragon computer voice recognition software. Quite often unanticipated grammatical, syntax, homophones, and other interpretive errors are inadvertently transcribed by the computer software. Please disregard these errors. Please excuse any errors that have escaped final proofreading.      Signed by:  Tea Kapoor DNP, ACNP-BC

## 2022-01-27 NOTE — REMOTE MONITORING
Attempted to contact primary RN regarding this patient. Left message with Charge RN regarding 6 hour Sepsis bundle. For any questions or concerns, please feel free to call [phone]. Thank You! [address] [facility]. Julian Gruber RN, BSN.

## 2022-01-27 NOTE — PROGRESS NOTES
Pt seen and examined  RRT called for HR in 170, after bedside I&D  Noted to have fever of 103 and Blood sugar of 70 (pt was NPO for I&D)  Lactic acid 3.7  Hb dropping, Keep > 7  I have discussed with the patient the rationale for blood component transfusion; its benefits in treating or preventing fatigue, organ damage, or death; and its risk which includes mild transfusion reactions, rare risk of blood borne infection, or more serious but rare reactions. I have discussed the alternatives to transfusion, including the risk and consequences of not receiving transfusion. The patient had an opportunity to ask questions and had agreed to proceed with transfusion of blood components.   Low folic acid, start supplement, denies Etoh abuse, may be 1-2 drinks a month  Cont ABx, f/u Cx  HR trended down with Tylenol and IVF, no need for rate control meds for now  Get ECHO  Pt does straight cath at home, refused malcolm catheter     Faye Cohn MD

## 2022-01-28 LAB
ABO + RH BLD: NORMAL
ANION GAP SERPL CALC-SCNC: 4 MMOL/L (ref 5–15)
BASOPHILS # BLD: 0 K/UL (ref 0–0.1)
BASOPHILS NFR BLD: 0 % (ref 0–1)
BLD PROD TYP BPU: NORMAL
BLOOD GROUP ANTIBODIES SERPL: NORMAL
BPU ID: NORMAL
BUN SERPL-MCNC: 5 MG/DL (ref 6–20)
BUN/CREAT SERPL: 20 (ref 12–20)
CALCIUM SERPL-MCNC: 7.5 MG/DL (ref 8.5–10.1)
CHLORIDE SERPL-SCNC: 107 MMOL/L (ref 97–108)
CO2 SERPL-SCNC: 25 MMOL/L (ref 21–32)
CREAT SERPL-MCNC: 0.25 MG/DL (ref 0.7–1.3)
CROSSMATCH RESULT,%XM: NORMAL
DIFFERENTIAL METHOD BLD: ABNORMAL
EOSINOPHIL # BLD: 0 K/UL (ref 0–0.4)
EOSINOPHIL NFR BLD: 0 % (ref 0–7)
ERYTHROCYTE [DISTWIDTH] IN BLOOD BY AUTOMATED COUNT: 16.4 % (ref 11.5–14.5)
GLUCOSE SERPL-MCNC: 82 MG/DL (ref 65–100)
HCT VFR BLD AUTO: 24.6 % (ref 36.6–50.3)
HGB BLD-MCNC: 7.1 G/DL (ref 12.1–17)
IMM GRANULOCYTES # BLD AUTO: 0.2 K/UL (ref 0–0.04)
IMM GRANULOCYTES NFR BLD AUTO: 1 % (ref 0–0.5)
LYMPHOCYTES # BLD: 1.9 K/UL (ref 0.8–3.5)
LYMPHOCYTES NFR BLD: 11 % (ref 12–49)
MCH RBC QN AUTO: 23.8 PG (ref 26–34)
MCHC RBC AUTO-ENTMCNC: 28.9 G/DL (ref 30–36.5)
MCV RBC AUTO: 82.6 FL (ref 80–99)
MONOCYTES # BLD: 1.6 K/UL (ref 0–1)
MONOCYTES NFR BLD: 9 % (ref 5–13)
NEUTS SEG # BLD: 13.9 K/UL (ref 1.8–8)
NEUTS SEG NFR BLD: 79 % (ref 32–75)
NRBC # BLD: 0 K/UL (ref 0–0.01)
NRBC BLD-RTO: 0 PER 100 WBC
PLATELET # BLD AUTO: 606 K/UL (ref 150–400)
PMV BLD AUTO: 8.8 FL (ref 8.9–12.9)
POTASSIUM SERPL-SCNC: 3.4 MMOL/L (ref 3.5–5.1)
RBC # BLD AUTO: 2.98 M/UL (ref 4.1–5.7)
RBC MORPH BLD: ABNORMAL
RBC MORPH BLD: ABNORMAL
SODIUM SERPL-SCNC: 136 MMOL/L (ref 136–145)
SPECIMEN EXP DATE BLD: NORMAL
STATUS OF UNIT,%ST: NORMAL
UNIT DIVISION, %UDIV: 0
WBC # BLD AUTO: 17.6 K/UL (ref 4.1–11.1)

## 2022-01-28 PROCEDURE — 74011000250 HC RX REV CODE- 250: Performed by: SURGERY

## 2022-01-28 PROCEDURE — 87040 BLOOD CULTURE FOR BACTERIA: CPT

## 2022-01-28 PROCEDURE — 80048 BASIC METABOLIC PNL TOTAL CA: CPT

## 2022-01-28 PROCEDURE — 36415 COLL VENOUS BLD VENIPUNCTURE: CPT

## 2022-01-28 PROCEDURE — 85025 COMPLETE CBC W/AUTO DIFF WBC: CPT

## 2022-01-28 PROCEDURE — 74011250637 HC RX REV CODE- 250/637: Performed by: STUDENT IN AN ORGANIZED HEALTH CARE EDUCATION/TRAINING PROGRAM

## 2022-01-28 PROCEDURE — 74011250636 HC RX REV CODE- 250/636: Performed by: INTERNAL MEDICINE

## 2022-01-28 PROCEDURE — 74011250637 HC RX REV CODE- 250/637: Performed by: INTERNAL MEDICINE

## 2022-01-28 PROCEDURE — 74011250637 HC RX REV CODE- 250/637: Performed by: GENERAL ACUTE CARE HOSPITAL

## 2022-01-28 PROCEDURE — 74011250636 HC RX REV CODE- 250/636: Performed by: STUDENT IN AN ORGANIZED HEALTH CARE EDUCATION/TRAINING PROGRAM

## 2022-01-28 PROCEDURE — 65660000001 HC RM ICU INTERMED STEPDOWN

## 2022-01-28 PROCEDURE — 99232 SBSQ HOSP IP/OBS MODERATE 35: CPT | Performed by: SURGERY

## 2022-01-28 PROCEDURE — 77030019905 HC CATH URETH INTMIT MDII -A

## 2022-01-28 PROCEDURE — 74011000258 HC RX REV CODE- 258: Performed by: GENERAL ACUTE CARE HOSPITAL

## 2022-01-28 PROCEDURE — 74011000258 HC RX REV CODE- 258: Performed by: STUDENT IN AN ORGANIZED HEALTH CARE EDUCATION/TRAINING PROGRAM

## 2022-01-28 PROCEDURE — 74011000250 HC RX REV CODE- 250: Performed by: STUDENT IN AN ORGANIZED HEALTH CARE EDUCATION/TRAINING PROGRAM

## 2022-01-28 PROCEDURE — 74011000250 HC RX REV CODE- 250: Performed by: GENERAL ACUTE CARE HOSPITAL

## 2022-01-28 RX ORDER — OXYCODONE AND ACETAMINOPHEN 10; 325 MG/1; MG/1
1 TABLET ORAL
Status: DISCONTINUED | OUTPATIENT
Start: 2022-01-28 | End: 2022-02-02 | Stop reason: HOSPADM

## 2022-01-28 RX ORDER — MORPHINE SULFATE 2 MG/ML
2 INJECTION, SOLUTION INTRAMUSCULAR; INTRAVENOUS
Status: DISCONTINUED | OUTPATIENT
Start: 2022-01-28 | End: 2022-02-02 | Stop reason: HOSPADM

## 2022-01-28 RX ORDER — ENOXAPARIN SODIUM 100 MG/ML
30 INJECTION SUBCUTANEOUS DAILY
Status: DISCONTINUED | OUTPATIENT
Start: 2022-01-28 | End: 2022-02-02 | Stop reason: HOSPADM

## 2022-01-28 RX ORDER — SODIUM CHLORIDE AND POTASSIUM CHLORIDE .9; .15 G/100ML; G/100ML
SOLUTION INTRAVENOUS CONTINUOUS
Status: DISCONTINUED | OUTPATIENT
Start: 2022-01-28 | End: 2022-01-31

## 2022-01-28 RX ORDER — HYDROMORPHONE HYDROCHLORIDE 1 MG/ML
0.5 INJECTION, SOLUTION INTRAMUSCULAR; INTRAVENOUS; SUBCUTANEOUS
Status: DISCONTINUED | OUTPATIENT
Start: 2022-01-28 | End: 2022-02-02 | Stop reason: HOSPADM

## 2022-01-28 RX ORDER — FOLIC ACID 1 MG/1
1 TABLET ORAL DAILY
Status: DISCONTINUED | OUTPATIENT
Start: 2022-01-29 | End: 2022-02-02 | Stop reason: HOSPADM

## 2022-01-28 RX ADMIN — VANCOMYCIN HYDROCHLORIDE 1000 MG: 1 INJECTION, POWDER, LYOPHILIZED, FOR SOLUTION INTRAVENOUS at 23:22

## 2022-01-28 RX ADMIN — OXYCODONE AND ACETAMINOPHEN 1 TABLET: 10; 325 TABLET ORAL at 22:20

## 2022-01-28 RX ADMIN — SODIUM CHLORIDE, PRESERVATIVE FREE 10 ML: 5 INJECTION INTRAVENOUS at 23:23

## 2022-01-28 RX ADMIN — METRONIDAZOLE 500 MG: 250 TABLET ORAL at 16:22

## 2022-01-28 RX ADMIN — MORPHINE SULFATE 2 MG: 2 INJECTION, SOLUTION INTRAMUSCULAR; INTRAVENOUS at 21:44

## 2022-01-28 RX ADMIN — VANCOMYCIN HYDROCHLORIDE 1000 MG: 1 INJECTION, POWDER, LYOPHILIZED, FOR SOLUTION INTRAVENOUS at 00:07

## 2022-01-28 RX ADMIN — CEFEPIME HYDROCHLORIDE 1 G: 1 INJECTION, POWDER, FOR SOLUTION INTRAMUSCULAR; INTRAVENOUS at 21:40

## 2022-01-28 RX ADMIN — SODIUM CHLORIDE 100 ML/HR: 9 INJECTION, SOLUTION INTRAVENOUS at 01:18

## 2022-01-28 RX ADMIN — OXYCODONE AND ACETAMINOPHEN 1 TABLET: 10; 325 TABLET ORAL at 18:12

## 2022-01-28 RX ADMIN — POTASSIUM CHLORIDE AND SODIUM CHLORIDE: 900; 150 INJECTION, SOLUTION INTRAVENOUS at 13:16

## 2022-01-28 RX ADMIN — OXYCODONE AND ACETAMINOPHEN 1 TABLET: 10; 325 TABLET ORAL at 13:15

## 2022-01-28 RX ADMIN — DAKIN'S SOLUTION 0.125% (QUARTER STRENGTH): 0.12 SOLUTION at 10:20

## 2022-01-28 RX ADMIN — CEFEPIME HYDROCHLORIDE 1 G: 1 INJECTION, POWDER, FOR SOLUTION INTRAMUSCULAR; INTRAVENOUS at 13:15

## 2022-01-28 RX ADMIN — OXYCODONE 5 MG: 5 TABLET ORAL at 01:19

## 2022-01-28 RX ADMIN — OXYCODONE 5 MG: 5 TABLET ORAL at 09:14

## 2022-01-28 RX ADMIN — SODIUM CHLORIDE, PRESERVATIVE FREE 10 ML: 5 INJECTION INTRAVENOUS at 13:19

## 2022-01-28 RX ADMIN — CEFEPIME HYDROCHLORIDE 1 G: 1 INJECTION, POWDER, FOR SOLUTION INTRAMUSCULAR; INTRAVENOUS at 05:05

## 2022-01-28 RX ADMIN — SODIUM CHLORIDE, PRESERVATIVE FREE 10 ML: 5 INJECTION INTRAVENOUS at 05:05

## 2022-01-28 RX ADMIN — FOLIC ACID: 5 INJECTION, SOLUTION INTRAMUSCULAR; INTRAVENOUS; SUBCUTANEOUS at 08:33

## 2022-01-28 RX ADMIN — OXYCODONE 5 MG: 5 TABLET ORAL at 05:05

## 2022-01-28 RX ADMIN — VANCOMYCIN HYDROCHLORIDE 1000 MG: 1 INJECTION, POWDER, LYOPHILIZED, FOR SOLUTION INTRAVENOUS at 08:33

## 2022-01-28 RX ADMIN — ACETAMINOPHEN 650 MG: 325 TABLET ORAL at 11:00

## 2022-01-28 RX ADMIN — VANCOMYCIN HYDROCHLORIDE 1000 MG: 1 INJECTION, POWDER, LYOPHILIZED, FOR SOLUTION INTRAVENOUS at 16:22

## 2022-01-28 NOTE — PROGRESS NOTES
Feels well.    AVSS. Dressing intact. Discussed wound with RN. Still with turbid d/c on dressing. WBC remains elevated but Ecoli UTI as well. Debrided more slough with scissors. No bleeding. Irrigated and repacked. Will increase dressing changed to BID given d/c. Will f/u on Monday. Please call if needed over the weekend.

## 2022-01-28 NOTE — PROGRESS NOTES
01/28/22 0733   Vitals   Temp 99.6 °F (37.6 °C)   Temp Source Oral   Pulse (Heart Rate) (!) 117   Heart Rate Source Monitor   Resp Rate 18   O2 Sat (%) 99 %   Level of Consciousness Alert (0)   /70   MAP (Calculated) 86   BP 1 Location Left upper arm   BP 1 Method Automatic   BP Patient Position At rest   Cardiac Rhythm Sinus Tachy   MEWS Score 3   Mew elevated r/t HR and temp., MD aware of pt condition.  Pt is tachy at baseline

## 2022-01-28 NOTE — PROGRESS NOTES
Spiritual Care Assessment/Progress Note  [facility]      NAME: Jud Lora      MRN: [id number]  AGE: 23 y.o. SEX: male  Bahai Affiliation: Hindu   Language: English     1/28/2022     Total Time (in minutes): 11     Spiritual Assessment begun in [facility]ARD[facility] through conversation with:         [x]Patient        [x] Family    [] Friend(s)        Reason for Consult: Initial/Spiritual assessment, patient floor     Spiritual beliefs: (Please include comment if needed)     [] Identifies with a perla tradition:         [] Supported by a perla community:            [] Claims no spiritual orientation:           [] Seeking spiritual identity:                [] Adheres to an individual form of spirituality:           [x] Not able to assess:                           Identified resources for coping:      [] Prayer                               [] Music                  [] Guided Imagery     [x] Family/friends                 [] Pet visits     [] Devotional reading                         [] Unknown     [] Other:                                              Interventions offered during this visit: (See comments for more details)    Patient Interventions: Affirmation of emotions/emotional suffering,Catharsis/review of pertinent events in supportive environment,Coping skills reviewed/reinforced,Initial/Spiritual assessment, patient floor     Family/Friend(s):  Affirmation of emotions/emotional suffering,Catharsis/review of pertinent events in supportive environment,Coping skills reviewed/reinforced     Plan of Care:     [] Support spiritual and/or cultural needs    [] Support AMD and/or advance care planning process      [] Support grieving process   [] Coordinate Rites and/or Rituals    [] Coordination with community clergy   [] No spiritual needs identified at this time   [] Detailed Plan of Care below (See Comments)  [] Make referral to Music Therapy  [] Make referral to Pet Therapy [] Make referral to Addiction services  [] Make referral to [facility]  [] Make referral to Spiritual Care Partner  [] No future visits requested        [x] Contact Spiritual Care for further referrals     Comments:     Initial Spiritual Care Assessment visit for Mr. Gume Dial in 2220. Reviewed the chart before visit. Patient was alert, his girl friend was present during the visit.  introduced self and role, explored their needs. They did not understand why  is visiting him.  explained him again the goal of visit, he declined  support.     [id number] [facility] MARY Shah.Div,Th.M, [address] Provider   Paging Service [phone])

## 2022-01-28 NOTE — PROGRESS NOTES
01/28/22 1052   Vitals   Temp 100.4 °F (38 °C)   Temp Source Oral   Pulse (Heart Rate) (!) 121   Heart Rate Source Monitor   Resp Rate 16   O2 Sat (%) 98 %   Level of Consciousness Alert (0)   BP (!) 113/49   MAP (Calculated) 70   BP 1 Location Left upper arm   BP 1 Method Automatic   BP Patient Position At rest   Cardiac Rhythm Sinus Tachy   MEWS Score 3   Mews elevated r/t temp and HR. MD aware of pt condition. Will give tylenol for fever.

## 2022-01-28 NOTE — PROGRESS NOTES
Hospitalist Progress Note    NAME: Jose Enrique Daly   :  1995   MRN:  [id number]       Assessment / Plan:  Staph sp Bacteremia POA-  EColiUTI POA  Causing severe Sepsis POA- improving, WBC still veronica at 17k, fever trended down  Stage IV sacral decubitus ulcers POA  Paraplegia-wheelchair-bound  CT abdomen/pelvis=  1. Extensive sacral decubitus soft tissue wounds with a large collection of  fluid and air in the right posterior soft tissues. There is sclerosis and  remodeling of the posterior ischii and sacrum. Osteomyelitis is not excluded.     2. Urinary bladder air can be seen with catheterization or infection    Follow Wound cultures, Blood culture & Urine cx  Repeat blood cx today for clearance  Cont empiric IV ABx- vancomycin, cefepime and Flagyl for now  Cont Probiotic  IP Wound care consulted- following  IP General surgery consulted- following, s/p Bedside Debridement in ER noted  -Case management consulted for the possible needs at home. Cont IV fluids and pain management.     Normocytic anemia  -Hemoglobin 7.8- >7.1->6.6->7.1 today AM  Ordered iron profile with ferritin, B12 and folate. Transfused 1 PRBC yesterday ()       Code Status: Full code  Surrogate Decision Maker:     DVT Prophylaxis: Lovenox  GI Prophylaxis: not indicated     Baseline: Wheelchair-bound at home    Estimated discharge date:   Barriers: home DMEs, surgical clearance    Recommended Disposition: Home w/Family and HH PT, OT, RN likely for wound care     Subjective:     Chief Complaint / Reason for Physician Visit : F/U sepsis, bacteremia, Decubitus ulcers, UTI, Paraplegia  \"I am feeling better today\". Discussed with RN events overnight.      Review of Systems:  Symptom Y/N Comments  Symptom Y/N Comments   Fever/Chills n   Chest Pain n    Poor Appetite n   Edema n    Cough n   Abdominal Pain n    Sputum n   Joint Pain n    SOB/BILLINGS n   Pruritis/Rash     Nausea/vomit    Tolerating PT/OT n    Diarrhea    Tolerating Diet y    Constipation    Other       Could NOT obtain due to:      Objective:     VITALS:   Last 24hrs VS reviewed since prior progress note. Most recent are:  Patient Vitals for the past 24 hrs:   Temp Pulse Resp BP SpO2   01/28/22 0733 99.6 °F (37.6 °C) (!) 117 18 117/70 99 %   01/28/22 0301 99.6 °F (37.6 °C) 97 14 113/71 --   01/28/22 0006 98.3 °F (36.8 °C) 89 14 (!) 103/56 --   01/27/22 2000 99.4 °F (37.4 °C) (!) 102 15 106/60 --   01/27/22 1603 -- -- -- (!) 113/58 --   01/27/22 1449 99.4 °F (37.4 °C) (!) 113 13 98/69 100 %   01/27/22 1345 99.6 °F (37.6 °C) (!) 115 14 107/71 100 %   01/27/22 1315 99.3 °F (37.4 °C) (!) 102 14 106/61 100 %   01/27/22 1301 -- (!) 116 14 (!) 104/56 100 %   01/27/22 1230 99.5 °F (37.5 °C) (!) 103 11 112/71 100 %   01/27/22 1215 98 °F (36.7 °C) (!) 104 12 109/62 100 %   01/27/22 1201 98 °F (36.7 °C) (!) 113 13 109/69 100 %   01/27/22 1037 98.7 °F (37.1 °C) (!) 114 13 (!) 104/51 99 %   01/27/22 1001 -- (!) 114 15 (!) 101/57 --       Intake/Output Summary (Last 24 hours) at 1/28/2022 0949  Last data filed at 1/28/2022 8782  Gross per 24 hour   Intake 463.8 ml   Output --   Net 463.8 ml        I had a face to face encounter and independently examined this patient on 1/28/2022, as outlined below:  PHYSICAL EXAM:  General: WD, WN. Alert, cooperative, no acute distress    EENT:  EOMI. Anicteric sclerae. MMM  Resp:  CTA bilaterally, no wheezing or rales. No accessory muscle use  CV:  Regular  rhythm,  No edema  GI:  Soft, Non distended, Non tender. +Bowel sounds  Neurologic:  Alert and oriented X 3, normal speech,   Psych:   Good insight.  Not anxious nor agitated  Skin:  R Buttock pressure Ulcer noted +    Reviewed most current lab test results and cultures  YES  Reviewed most current radiology test results   YES  Review and summation of old records today    NO  Reviewed patient's current orders and MAR    YES  PMH/SH reviewed - no change compared to H&P  ________________________________________________________________________  Care Plan discussed with:    Comments   Patient x    Family      RN x    Care Manager x    Consultant                    x Multidiciplinary team rounds were held today with , nursing, pharmacist and clinical coordinator. Patient's plan of care was discussed; medications were reviewed and discharge planning was addressed. ________________________________________________________________________  Total NON critical care TIME:  36   Minutes    Total CRITICAL CARE TIME Spent:   Minutes non procedure based      Comments   >50% of visit spent in counseling and coordination of care x    ________________________________________________________________________  Viry Gill MD     Procedures: see electronic medical records for all procedures/Xrays and details which were not copied into this note but were reviewed prior to creation of Plan. LABS:  I reviewed today's most current labs and imaging studies. Pertinent labs include:  Recent Labs     01/28/22  0303 01/27/22  0525 01/26/22  1520   WBC 17.6* 16.4* 15.1*   HGB 7.1* 6.6* 7.1*   HCT 24.6* 23.1* 25.4*   * 590* 624*     Recent Labs     01/28/22  0303 01/27/22  0525 01/26/22  1520 01/25/22  2323 01/25/22  2323    137 134*   < > 131*   K 3.4* 3.7 3.9   < > 3.6    107 103   < > 99   CO2 25 23 23   < > 26   GLU 82 88 83   < > 100   BUN 5* 8 13   < > 11   CREA 0.25* 0.38* 0.57*   < > 0.44*   CA 7.5* 8.0* 8.2*   < > 8.5   MG  --   --  2.4  --   --    ALB  --   --  1.6*  --  1.7*   TBILI  --   --  0.9  --  0.9   ALT  --   --  8*  --  7*    < > = values in this interval not displayed.        Signed: Viry Gill MD

## 2022-01-28 NOTE — PROGRESS NOTES
Problem: Pressure Injury - Risk of  Goal: *Prevention of pressure injury  Description: Document Dimitri Scale and appropriate interventions in the flowsheet. Outcome: Progressing Towards Goal  Note: Pressure Injury Interventions:  Sensory Interventions: Float heels,Keep linens dry and wrinkle-free,Maintain/enhance activity level,Minimize linen layers,Pressure redistribution bed/mattress (bed type),Turn and reposition approx. every two hours (pillows and wedges if needed)    Moisture Interventions: Absorbent underpads,Minimize layers,Internal/External urinary devices    Activity Interventions: Pressure redistribution bed/mattress(bed type)    Mobility Interventions: HOB 30 degrees or less,Pressure redistribution bed/mattress (bed type),Turn and reposition approx. every two hours(pillow and wedges)    Nutrition Interventions: Document food/fluid/supplement intake    Friction and Shear Interventions: HOB 30 degrees or less,Lift sheet,Minimize layers                Problem: Patient Education: Go to Patient Education Activity  Goal: Patient/Family Education  Outcome: Progressing Towards Goal     Problem: Falls - Risk of  Goal: *Absence of Falls  Description: Document Ezekiel Fall Risk and appropriate interventions in the flowsheet.   Outcome: Progressing Towards Goal  Note: Fall Risk Interventions:  Mobility Interventions: Communicate number of staff needed for ambulation/transfer,Bed/chair exit alarm,OT consult for ADLs,Patient to call before getting OOB,PT Consult for mobility concerns,PT Consult for assist device competence,Utilize walker, cane, or other assistive device,Strengthening exercises (ROM-active/passive)         Medication Interventions: Bed/chair exit alarm,Evaluate medications/consider consulting pharmacy,Patient to call before getting OOB,Teach patient to arise slowly    Elimination Interventions: Bed/chair exit alarm,Call light in reach,Patient to call for help with toileting needs              Problem: Patient Education: Go to Patient Education Activity  Goal: Patient/Family Education  Outcome: Progressing Towards Goal     Problem: Impaired Skin Integrity/Pressure Injury Treatment  Goal: *Improvement of Existing Pressure Injury  Outcome: Progressing Towards Goal  Goal: *Prevention of pressure injury  Description: Document Dimitri Scale and appropriate interventions in the flowsheet. Outcome: Progressing Towards Goal  Note: Pressure Injury Interventions:  Sensory Interventions: Float heels,Keep linens dry and wrinkle-free,Maintain/enhance activity level,Minimize linen layers,Pressure redistribution bed/mattress (bed type),Turn and reposition approx. every two hours (pillows and wedges if needed)    Moisture Interventions: Absorbent underpads,Minimize layers,Internal/External urinary devices    Activity Interventions: Pressure redistribution bed/mattress(bed type)    Mobility Interventions: HOB 30 degrees or less,Pressure redistribution bed/mattress (bed type),Turn and reposition approx.  every two hours(pillow and wedges)    Nutrition Interventions: Document food/fluid/supplement intake    Friction and Shear Interventions: HOB 30 degrees or less,Lift sheet,Minimize layers                Problem: Patient Education: Go to Patient Education Activity  Goal: Patient/Family Education  Outcome: Progressing Towards Goal

## 2022-01-28 NOTE — PROGRESS NOTES
Problem: Pressure Injury - Risk of  Goal: *Prevention of pressure injury  Description: Document Dimitri Scale and appropriate interventions in the flowsheet. Outcome: Progressing Towards Goal  Note: Pressure Injury Interventions:  Sensory Interventions: Assess changes in LOC,Float heels,Keep linens dry and wrinkle-free,Maintain/enhance activity level,Minimize linen layers,Monitor skin under medical devices    Moisture Interventions: Absorbent underpads,Internal/External urinary devices,Limit adult briefs,Minimize layers    Activity Interventions: Pressure redistribution bed/mattress(bed type),Assess need for specialty bed    Mobility Interventions: Assess need for specialty bed,Pressure redistribution bed/mattress (bed type)    Nutrition Interventions: Document food/fluid/supplement intake    Friction and Shear Interventions: Apply protective barrier, creams and emollients,Feet elevated on foot rest,Lift sheet,Minimize layers                Problem: Patient Education: Go to Patient Education Activity  Goal: Patient/Family Education  Outcome: Progressing Towards Goal     Problem: Falls - Risk of  Goal: *Absence of Falls  Description: Document Ezekiel Fall Risk and appropriate interventions in the flowsheet.   Outcome: Progressing Towards Goal  Note: Fall Risk Interventions:  Mobility Interventions: Bed/chair exit alarm,Patient to call before getting OOB         Medication Interventions: Bed/chair exit alarm,Patient to call before getting OOB,Teach patient to arise slowly    Elimination Interventions: Bed/chair exit alarm,Call light in reach,Toilet paper/wipes in reach,Stay With Me (per policy)              Problem: Patient Education: Go to Patient Education Activity  Goal: Patient/Family Education  Outcome: Progressing Towards Goal     Problem: Impaired Skin Integrity/Pressure Injury Treatment  Goal: *Improvement of Existing Pressure Injury  Outcome: Progressing Towards Goal  Goal: *Prevention of pressure injury  Description: Document Dimitri Scale and appropriate interventions in the flowsheet.   Outcome: Progressing Towards Goal  Note: Pressure Injury Interventions:  Sensory Interventions: Assess changes in LOC,Float heels,Keep linens dry and wrinkle-free,Maintain/enhance activity level,Minimize linen layers,Monitor skin under medical devices    Moisture Interventions: Absorbent underpads,Internal/External urinary devices,Limit adult briefs,Minimize layers    Activity Interventions: Pressure redistribution bed/mattress(bed type),Assess need for specialty bed    Mobility Interventions: Assess need for specialty bed,Pressure redistribution bed/mattress (bed type)    Nutrition Interventions: Document food/fluid/supplement intake    Friction and Shear Interventions: Apply protective barrier, creams and emollients,Feet elevated on foot rest,Lift sheet,Minimize layers                Problem: Patient Education: Go to Patient Education Activity  Goal: Patient/Family Education  Outcome: Progressing Towards Goal     Problem: Urinary Tract Infection - Adult  Goal: *Absence of infection signs and symptoms  Outcome: Progressing Towards Goal     Problem: Patient Education: Go to Patient Education Activity  Goal: Patient/Family Education  Outcome: Progressing Towards Goal     Problem: Patient Education: Go to Patient Education Activity  Goal: Patient/Family Education  Outcome: Progressing Towards Goal     Problem: Sepsis: Day of Diagnosis  Goal: Off Pathway (Use only if patient is Off Pathway)  Outcome: Progressing Towards Goal  Goal: *Fluid resuscitation  Outcome: Progressing Towards Goal  Goal: *Paired blood cultures prior to first dose of antibiotic  Outcome: Progressing Towards Goal  Goal: *First dose of  appropriate antibiotic within 3 hours of arrival to ED, within 1 hour of arrival to ICU  Outcome: Progressing Towards Goal  Goal: *Lactic acid with first set of blood cultures  Outcome: Progressing Towards Goal  Goal: *Pneumococcal immunization (if eligible)  Outcome: Progressing Towards Goal  Goal: *Influenza immunization (if eligible)  Outcome: Progressing Towards Goal  Goal: Activity/Safety  Outcome: Progressing Towards Goal  Goal: Consults, if ordered  Outcome: Progressing Towards Goal  Goal: Diagnostic Test/Procedures  Outcome: Progressing Towards Goal  Goal: Nutrition/Diet  Outcome: Progressing Towards Goal  Goal: Discharge Planning  Outcome: Progressing Towards Goal  Goal: Medications  Outcome: Progressing Towards Goal  Goal: Respiratory  Outcome: Progressing Towards Goal  Goal: Treatments/Interventions/Procedures  Outcome: Progressing Towards Goal  Goal: Psychosocial  Outcome: Progressing Towards Goal     Problem: Sepsis: Day 2  Goal: Off Pathway (Use only if patient is Off Pathway)  Outcome: Progressing Towards Goal  Goal: *Central Venous Pressure maintained at 8-12 mm Hg  Outcome: Progressing Towards Goal  Goal: *Hemodynamically stable  Outcome: Progressing Towards Goal  Goal: *Tolerating diet  Outcome: Progressing Towards Goal  Goal: Activity/Safety  Outcome: Progressing Towards Goal  Goal: Consults, if ordered  Outcome: Progressing Towards Goal  Goal: Diagnostic Test/Procedures  Outcome: Progressing Towards Goal  Goal: Nutrition/Diet  Outcome: Progressing Towards Goal  Goal: Discharge Planning  Outcome: Progressing Towards Goal  Goal: Medications  Outcome: Progressing Towards Goal  Goal: Respiratory  Outcome: Progressing Towards Goal  Goal: Treatments/Interventions/Procedures  Outcome: Progressing Towards Goal  Goal: Psychosocial  Outcome: Progressing Towards Goal     Problem: Sepsis: Day 3  Goal: Off Pathway (Use only if patient is Off Pathway)  Outcome: Progressing Towards Goal  Goal: *Central Venous Pressure maintained at 8-12 mm Hg  Outcome: Progressing Towards Goal  Goal: *Oxygen saturation within defined limits  Outcome: Progressing Towards Goal  Goal: *Vital sign stability  Outcome: Progressing Towards Goal  Goal: *Tolerating diet  Outcome: Progressing Towards Goal  Goal: *Demonstrates progressive activity  Outcome: Progressing Towards Goal  Goal: Activity/Safety  Outcome: Progressing Towards Goal  Goal: Consults, if ordered  Outcome: Progressing Towards Goal  Goal: Diagnostic Test/Procedures  Outcome: Progressing Towards Goal  Goal: Nutrition/Diet  Outcome: Progressing Towards Goal  Goal: Discharge Planning  Outcome: Progressing Towards Goal  Goal: Medications  Outcome: Progressing Towards Goal  Goal: Respiratory  Outcome: Progressing Towards Goal  Goal: Treatments/Interventions/Procedures  Outcome: Progressing Towards Goal  Goal: Psychosocial  Outcome: Progressing Towards Goal     Problem: Sepsis: Day 4  Goal: Off Pathway (Use only if patient is Off Pathway)  Outcome: Progressing Towards Goal  Goal: Activity/Safety  Outcome: Progressing Towards Goal  Goal: Consults, if ordered  Outcome: Progressing Towards Goal  Goal: Diagnostic Test/Procedures  Outcome: Progressing Towards Goal  Goal: Nutrition/Diet  Outcome: Progressing Towards Goal  Goal: Discharge Planning  Outcome: Progressing Towards Goal  Goal: Medications  Outcome: Progressing Towards Goal  Goal: Respiratory  Outcome: Progressing Towards Goal  Goal: Treatments/Interventions/Procedures  Outcome: Progressing Towards Goal  Goal: Psychosocial  Outcome: Progressing Towards Goal  Goal: *Oxygen saturation within defined limits  Outcome: Progressing Towards Goal  Goal: *Hemodynamically stable  Outcome: Progressing Towards Goal  Goal: *Vital signs within defined limits  Outcome: Progressing Towards Goal  Goal: *Tolerating diet  Outcome: Progressing Towards Goal  Goal: *Demonstrates progressive activity  Outcome: Progressing Towards Goal  Goal: *Fluid volume maintenance  Outcome: Progressing Towards Goal     Problem: Sepsis: Day 5  Goal: Off Pathway (Use only if patient is Off Pathway)  Outcome: Progressing Towards Goal  Goal: *Oxygen saturation within defined limits  Outcome: Progressing Towards Goal  Goal: *Vital signs within defined limits  Outcome: Progressing Towards Goal  Goal: *Tolerating diet  Outcome: Progressing Towards Goal  Goal: *Demonstrates progressive activity  Outcome: Progressing Towards Goal  Goal: *Discharge plan identified  Outcome: Progressing Towards Goal  Goal: Activity/Safety  Outcome: Progressing Towards Goal  Goal: Consults, if ordered  Outcome: Progressing Towards Goal  Goal: Diagnostic Test/Procedures  Outcome: Progressing Towards Goal  Goal: Nutrition/Diet  Outcome: Progressing Towards Goal  Goal: Discharge Planning  Outcome: Progressing Towards Goal  Goal: Medications  Outcome: Progressing Towards Goal  Goal: Respiratory  Outcome: Progressing Towards Goal  Goal: Treatments/Interventions/Procedures  Outcome: Progressing Towards Goal  Goal: Psychosocial  Outcome: Progressing Towards Goal     Problem: Sepsis: Day 6  Goal: Off Pathway (Use only if patient is Off Pathway)  Outcome: Progressing Towards Goal  Goal: *Oxygen saturation within defined limits  Outcome: Progressing Towards Goal  Goal: *Vital signs within defined limits  Outcome: Progressing Towards Goal  Goal: *Tolerating diet  Outcome: Progressing Towards Goal  Goal: *Demonstrates progressive activity  Outcome: Progressing Towards Goal  Goal: Influenza immunization  Outcome: Progressing Towards Goal  Goal: *Pneumococcal immunization  Outcome: Progressing Towards Goal  Goal: Activity/Safety  Outcome: Progressing Towards Goal  Goal: Diagnostic Test/Procedures  Outcome: Progressing Towards Goal  Goal: Nutrition/Diet  Outcome: Progressing Towards Goal  Goal: Discharge Planning  Outcome: Progressing Towards Goal  Goal: Medications  Outcome: Progressing Towards Goal  Goal: Respiratory  Outcome: Progressing Towards Goal  Goal: Treatments/Interventions/Procedures  Outcome: Progressing Towards Goal  Goal: Psychosocial  Outcome: Progressing Towards Goal     Problem: Sepsis: Discharge Outcomes  Goal: *Vital signs within defined limits  Outcome: Progressing Towards Goal  Goal: *Tolerating diet  Outcome: Progressing Towards Goal  Goal: *Verbalizes understanding and describes prescribed diet  Outcome: Progressing Towards Goal  Goal: *Demonstrates progressive activity  Outcome: Progressing Towards Goal  Goal: *Describes follow-up/return visits to physicians  Outcome: Progressing Towards Goal  Goal: *Verbalizes name, dosage, time, side effects, and number of days to continue medications  Outcome: Progressing Towards Goal  Goal: *Influenza immunization (Oct-Mar only)  Outcome: Progressing Towards Goal  Goal: *Pneumococcal immunization  Outcome: Progressing Towards Goal  Goal: *Lungs clear or at baseline  Outcome: Progressing Towards Goal  Goal: *Oxygen saturation returns to baseline or 90% or better on room air  Outcome: Progressing Towards Goal  Goal: *Glycemic control  Outcome: Progressing Towards Goal  Goal: *Absence of deep venous thrombosis signs and symptoms(Stroke Metric)  Outcome: Progressing Towards Goal  Goal: *Describes available resources and support systems  Outcome: Progressing Towards Goal  Goal: *Optimal pain control at patient's stated goal  Outcome: Progressing Towards Goal

## 2022-01-28 NOTE — PROGRESS NOTES
Problem: Pressure Injury - Risk of  Goal: *Prevention of pressure injury  Description: Document Dimitri Scale and appropriate interventions in the flowsheet. Outcome: Progressing Towards Goal  Note: Pressure Injury Interventions:  Sensory Interventions: Assess changes in LOC,Assess need for specialty bed,Float heels,Keep linens dry and wrinkle-free,Maintain/enhance activity level,Minimize linen layers    Moisture Interventions: Absorbent underpads,Internal/External urinary devices    Activity Interventions: Assess need for specialty bed,Pressure redistribution bed/mattress(bed type)    Mobility Interventions: HOB 30 degrees or less,Pressure redistribution bed/mattress (bed type)    Nutrition Interventions: Document food/fluid/supplement intake    Friction and Shear Interventions: Apply protective barrier, creams and emollients,HOB 30 degrees or less,Lift sheet,Minimize layers                Problem: Falls - Risk of  Goal: *Absence of Falls  Description: Document Ezekiel Fall Risk and appropriate interventions in the flowsheet.   Outcome: Progressing Towards Goal  Note: Fall Risk Interventions:  Mobility Interventions: Bed/chair exit alarm,Communicate number of staff needed for ambulation/transfer,Patient to call before getting OOB,PT Consult for mobility concerns,Utilize walker, cane, or other assistive device         Medication Interventions: Bed/chair exit alarm,Patient to call before getting OOB,Teach patient to arise slowly    Elimination Interventions: Bed/chair exit alarm,Call light in reach,Patient to call for help with toileting needs              Problem: Impaired Skin Integrity/Pressure Injury Treatment  Goal: *Improvement of Existing Pressure Injury  Outcome: Progressing Towards Goal

## 2022-01-28 NOTE — PROGRESS NOTES
0700: Bedside and Verbal shift change report given to Alba Arechiga RN (oncoming nurse) by Maria Teresa Kerr (offgoing nurse). Report included the following information SBAR, Kardex, Intake/Output, MAR, Recent Results and Cardiac Rhythm Sinus Tachy. 0745: Pt threatening to \"check out\". Spoke with pt about the importance of staying and notified him that the doctor will be rounding this morning to see him. 8941: Pt refused oral antibiotic. Pt educated on the importance of this medication. Pt verbalizes understanding of what medication is for and still refusing medication. Pt reporting pain but also refusing pain meds. 8961: Pt called out now requesting pain medication for 10/10 pain. Pt only wants Roxicodone. Medication given. 1105: Pt refused oral flagyl. PT again educated on the importance of the medication. Pt requesting to speak with MD because he would like to leave. Perfect Serve sent to MD Kit    1200: Pt refusing lovenox injection. Pt educated on the importance of the medication. Pt states \"I don't get blood clots\". 1400: Pt straight cathed himself obtaining 450 mL of urine. 1530: MD Wander at bedside. 1850: Pt straight cathed himself obtained 600 mL or urine. 1900:   End of Shift Note    Bedside shift change report given to Aliza Dutta (oncoming nurse) by Alba Arechiga RN (offgoing nurse).   Report included the following information SBAR, Kardex, Intake/Output, MAR, Recent Results and Cardiac Rhythm NSR to ST    Shift worked:  6662-2091     Shift summary and any significant changes:     See above     Concerns for physician to address:  none     Zone phone for oncoming shift:          Activity:  Activity Level: Bed Rest  Number times ambulated in hallways past shift: 0  Number of times OOB to chair past shift: 0    Cardiac:   Cardiac Monitoring: Yes      Cardiac Rhythm: Sinus Rhythm    Access:   Current line(s): PIV     Genitourinary:   Urinary status: straight cath and self cath    Respiratory:   O2 Device: None (Room air)  Chronic home O2 use?: NO  Incentive spirometer at bedside: NO     GI:     Current diet:  ADULT DIET Regular  DIET ONE TIME MESSAGE  ADULT ORAL NUTRITION SUPPLEMENT Breakfast, Lunch, Dinner; Standard High Calorie/High Protein  ADULT ORAL NUTRITION SUPPLEMENT Lunch, Dinner; Frozen Supplement  Passing flatus: YES  Tolerating current diet: YES       Pain Management:   Patient states pain is manageable on current regimen: YES    Skin:  Dimitri Score: 13  Interventions: speciality bed, float heels and limit briefs    Patient Safety:  Fall Score:  Total Score: 1  Interventions: bed/chair alarm, assistive device (walker, cane, etc), gripper socks, pt to call before getting OOB and stay with me (per policy)  High Fall Risk: Yes    Length of Stay:  Expected LOS: - - -  Actual LOS: 2      Javid Bai RN

## 2022-01-28 NOTE — PROGRESS NOTES
0700: End of Shift Note    Bedside shift change report given to Mahin Villalta RN (oncoming nurse) by Adalberto Parra (offgoing nurse). Report included the following information SBAR, Kardex, Intake/Output, MAR, Recent Results and Cardiac Rhythm ST    Shift worked:  0646-8004     Shift summary and any significant changes:     Akilah given twice for leg pain     Concerns for physician to address:       Zone phone for oncoming shift:          Activity:  Activity Level: Bed Rest  Number times ambulated in hallways past shift: 0  Number of times OOB to chair past shift: 0    Cardiac:   Cardiac Monitoring: Yes      Cardiac Rhythm: Sinus Tachy    Access:   Current line(s): PIV     Genitourinary:   Urinary status: self cath    Respiratory:   O2 Device: None (Room air)  Chronic home O2 use?: NO  Incentive spirometer at bedside: NO     GI:     Current diet:  ADULT DIET Regular  DIET ONE TIME MESSAGE  ADULT ORAL NUTRITION SUPPLEMENT Breakfast, Lunch, Dinner; Standard High Calorie/High Protein  ADULT ORAL NUTRITION SUPPLEMENT Lunch, Dinner; Frozen Supplement  Passing flatus: YES  Tolerating current diet: YES       Pain Management:   Patient states pain is manageable on current regimen: YES    Skin:  Dimitri Score: 13  Interventions: speciality bed, foam dressing and internal/external urinary devices    Patient Safety:  Fall Score:  Total Score: 3  Interventions: bed/chair alarm  High Fall Risk: Yes    Length of Stay:  Expected LOS: - - -  Actual LOS: [address] [facility]

## 2022-01-29 LAB
ANION GAP SERPL CALC-SCNC: 6 MMOL/L (ref 5–15)
BACTERIA SPEC CULT: ABNORMAL
BUN SERPL-MCNC: 5 MG/DL (ref 6–20)
BUN/CREAT SERPL: 19 (ref 12–20)
CALCIUM SERPL-MCNC: 7.7 MG/DL (ref 8.5–10.1)
CC UR VC: ABNORMAL
CHLORIDE SERPL-SCNC: 105 MMOL/L (ref 97–108)
CO2 SERPL-SCNC: 26 MMOL/L (ref 21–32)
CREAT SERPL-MCNC: 0.27 MG/DL (ref 0.7–1.3)
DATE LAST DOSE: NORMAL
ERYTHROCYTE [DISTWIDTH] IN BLOOD BY AUTOMATED COUNT: 16.7 % (ref 11.5–14.5)
GLUCOSE SERPL-MCNC: 81 MG/DL (ref 65–100)
GRAM STN SPEC: ABNORMAL
HCT VFR BLD AUTO: 23.8 % (ref 36.6–50.3)
HGB BLD-MCNC: 6.9 G/DL (ref 12.1–17)
MCH RBC QN AUTO: 24 PG (ref 26–34)
MCHC RBC AUTO-ENTMCNC: 29 G/DL (ref 30–36.5)
MCV RBC AUTO: 82.6 FL (ref 80–99)
NRBC # BLD: 0 K/UL (ref 0–0.01)
NRBC BLD-RTO: 0 PER 100 WBC
PLATELET # BLD AUTO: 589 K/UL (ref 150–400)
PMV BLD AUTO: 8.5 FL (ref 8.9–12.9)
POTASSIUM SERPL-SCNC: 3.4 MMOL/L (ref 3.5–5.1)
RBC # BLD AUTO: 2.88 M/UL (ref 4.1–5.7)
REPORTED DOSE,DOSE: NORMAL UNITS
REPORTED DOSE/TIME,TMG: NORMAL
SERVICE CMNT-IMP: ABNORMAL
SODIUM SERPL-SCNC: 137 MMOL/L (ref 136–145)
VANCOMYCIN TROUGH SERPL-MCNC: 9.8 UG/ML (ref 5–10)
WBC # BLD AUTO: 17.6 K/UL (ref 4.1–11.1)

## 2022-01-29 PROCEDURE — 85027 COMPLETE CBC AUTOMATED: CPT

## 2022-01-29 PROCEDURE — 74011250636 HC RX REV CODE- 250/636: Performed by: INTERNAL MEDICINE

## 2022-01-29 PROCEDURE — 77030019905 HC CATH URETH INTMIT MDII -A

## 2022-01-29 PROCEDURE — 74011250637 HC RX REV CODE- 250/637: Performed by: STUDENT IN AN ORGANIZED HEALTH CARE EDUCATION/TRAINING PROGRAM

## 2022-01-29 PROCEDURE — 74011250637 HC RX REV CODE- 250/637: Performed by: INTERNAL MEDICINE

## 2022-01-29 PROCEDURE — 74011250636 HC RX REV CODE- 250/636: Performed by: STUDENT IN AN ORGANIZED HEALTH CARE EDUCATION/TRAINING PROGRAM

## 2022-01-29 PROCEDURE — 65660000001 HC RM ICU INTERMED STEPDOWN

## 2022-01-29 PROCEDURE — 74011000250 HC RX REV CODE- 250: Performed by: STUDENT IN AN ORGANIZED HEALTH CARE EDUCATION/TRAINING PROGRAM

## 2022-01-29 PROCEDURE — 36415 COLL VENOUS BLD VENIPUNCTURE: CPT

## 2022-01-29 PROCEDURE — 80048 BASIC METABOLIC PNL TOTAL CA: CPT

## 2022-01-29 PROCEDURE — 80202 ASSAY OF VANCOMYCIN: CPT

## 2022-01-29 PROCEDURE — 74011000258 HC RX REV CODE- 258: Performed by: STUDENT IN AN ORGANIZED HEALTH CARE EDUCATION/TRAINING PROGRAM

## 2022-01-29 RX ADMIN — CEFEPIME HYDROCHLORIDE 1 G: 1 INJECTION, POWDER, FOR SOLUTION INTRAMUSCULAR; INTRAVENOUS at 06:13

## 2022-01-29 RX ADMIN — HYDROMORPHONE HYDROCHLORIDE 0.5 MG: 1 INJECTION, SOLUTION INTRAMUSCULAR; INTRAVENOUS; SUBCUTANEOUS at 15:41

## 2022-01-29 RX ADMIN — SODIUM CHLORIDE, PRESERVATIVE FREE 10 ML: 5 INJECTION INTRAVENOUS at 23:41

## 2022-01-29 RX ADMIN — SODIUM CHLORIDE, PRESERVATIVE FREE 10 ML: 5 INJECTION INTRAVENOUS at 14:36

## 2022-01-29 RX ADMIN — METRONIDAZOLE 500 MG: 250 TABLET ORAL at 08:13

## 2022-01-29 RX ADMIN — Medication 1 LOZENGE: at 13:32

## 2022-01-29 RX ADMIN — OXYCODONE AND ACETAMINOPHEN 1 TABLET: 10; 325 TABLET ORAL at 08:47

## 2022-01-29 RX ADMIN — VANCOMYCIN HYDROCHLORIDE 1000 MG: 1 INJECTION, POWDER, LYOPHILIZED, FOR SOLUTION INTRAVENOUS at 16:51

## 2022-01-29 RX ADMIN — OXYCODONE AND ACETAMINOPHEN 1 TABLET: 10; 325 TABLET ORAL at 14:27

## 2022-01-29 RX ADMIN — METRONIDAZOLE 500 MG: 250 TABLET ORAL at 16:51

## 2022-01-29 RX ADMIN — OXYCODONE AND ACETAMINOPHEN 1 TABLET: 10; 325 TABLET ORAL at 18:43

## 2022-01-29 RX ADMIN — HYDROMORPHONE HYDROCHLORIDE 0.5 MG: 1 INJECTION, SOLUTION INTRAMUSCULAR; INTRAVENOUS; SUBCUTANEOUS at 11:55

## 2022-01-29 RX ADMIN — POTASSIUM CHLORIDE AND SODIUM CHLORIDE: 900; 150 INJECTION, SOLUTION INTRAVENOUS at 13:28

## 2022-01-29 RX ADMIN — Medication 1 CAPSULE: at 10:47

## 2022-01-29 RX ADMIN — FOLIC ACID 1 MG: 1 TABLET ORAL at 10:47

## 2022-01-29 RX ADMIN — VANCOMYCIN HYDROCHLORIDE 1000 MG: 1 INJECTION, POWDER, LYOPHILIZED, FOR SOLUTION INTRAVENOUS at 23:41

## 2022-01-29 RX ADMIN — OXYCODONE AND ACETAMINOPHEN 1 TABLET: 10; 325 TABLET ORAL at 02:24

## 2022-01-29 RX ADMIN — CEFEPIME HYDROCHLORIDE 1 G: 1 INJECTION, POWDER, FOR SOLUTION INTRAMUSCULAR; INTRAVENOUS at 14:27

## 2022-01-29 RX ADMIN — METRONIDAZOLE 500 MG: 250 TABLET ORAL at 11:39

## 2022-01-29 RX ADMIN — SODIUM CHLORIDE, PRESERVATIVE FREE 10 ML: 5 INJECTION INTRAVENOUS at 08:19

## 2022-01-29 RX ADMIN — HYDROMORPHONE HYDROCHLORIDE 0.5 MG: 1 INJECTION, SOLUTION INTRAMUSCULAR; INTRAVENOUS; SUBCUTANEOUS at 23:47

## 2022-01-29 RX ADMIN — CEFEPIME HYDROCHLORIDE 1 G: 1 INJECTION, POWDER, FOR SOLUTION INTRAMUSCULAR; INTRAVENOUS at 22:52

## 2022-01-29 RX ADMIN — VANCOMYCIN HYDROCHLORIDE 1000 MG: 1 INJECTION, POWDER, LYOPHILIZED, FOR SOLUTION INTRAVENOUS at 08:14

## 2022-01-29 RX ADMIN — DAKIN'S SOLUTION 0.125% (QUARTER STRENGTH): 0.12 SOLUTION at 13:46

## 2022-01-29 NOTE — PROGRESS NOTES
01/29/22 0827   Vitals   Temp 98.4 °F (36.9 °C)   Temp Source Oral   Pulse (Heart Rate) (!) 113   Resp Rate 22   O2 Sat (%) 98 %   Level of Consciousness Alert (0)   /65   MAP (Monitor) 81   MAP (Calculated) 84   BP 1 Location Left upper arm   BP 1 Method Automatic   BP Patient Position At rest   MEWS Score 4   Box Number hw   Electrodes Replaced No   Pain 1   Pain Scale 1 Numeric (0 - 10)   Pain Intensity 1 10   Patient Stated Pain Goal 5   Pain Onset 1 acute   Pain Location 1 Leg   Pain Orientation 1 Left   Pain Description 1 Aching   Pain Intervention(s) 1 Emotional support   Patent HR elevated due to pain. Will notify provider and charge nurse.   Continue to monitor patient after pain meds administered

## 2022-01-29 NOTE — PROGRESS NOTES
Hospitalist Progress Note    NAME: Elizabeth Penny   :  1995   MRN:  [id number]       Assessment / Plan:  Streptococcal & Staphylococcal sp Bacteremia POA-  EColi UTI POA  Causing severe Sepsis POA- improving, WBC still at 17k but stable,  fever trended down  Stage IV sacral decubitus ulcers POA  Paraplegia-wheelchair-bound  CT abdomen/pelvis=  1. Extensive sacral decubitus soft tissue wounds with a large collection of  fluid and air in the right posterior soft tissues. There is sclerosis and  remodeling of the posterior ischii and sacrum. Osteomyelitis is not excluded.     2. Urinary bladder air can be seen with catheterization or infection    Follow Wound cultures, Blood culture & Urine cx  Repeat blood cx ()= neg in 19 hrs  Cont empiric IV ABx- vancomycin, cefepime and Flagyl for now  Cont Probiotic  IP Wound care consulted- following  IP General surgery consulted- following, s/p Bedside Debridement in ER and at bedside yesterday () noted  -Case management consulted for the possible needs at home. Cont IV fluids and pain management.     Normocytic anemia  -Hemoglobin 7.8- >7.1->6.6->7.1->6.9 today AM  Ordered iron profile with ferritin, B12 and folate. Transfused 1 PRBC ()  Follow CBC in AM  No transfusion today       Code Status: Full code  Surrogate Decision Maker: girl friend? ?     DVT Prophylaxis: Lovenox  GI Prophylaxis: not indicated     Baseline: Wheelchair-bound at home    Estimated discharge date: ? Barriers: home DMEs, surgical clearance    Recommended Disposition: Home w/Family and HH PT, OT, RN likely for wound care     Subjective:     Chief Complaint / Reason for Physician Visit : F/U sepsis, bacteremia, Decubitus ulcers, UTI, Paraplegia  \"I am feeling better today\". Discussed with RN events overnight.      Review of Systems:  Symptom Y/N Comments  Symptom Y/N Comments   Fever/Chills n   Chest Pain n    Poor Appetite n   Edema n    Cough n   Abdominal Pain n    Sputum n   Joint Pain n    SOB/BILLINGS n   Pruritis/Rash     Nausea/vomit    Tolerating PT/OT n    Diarrhea    Tolerating Diet y    Constipation    Other       Could NOT obtain due to:      Objective:     VITALS:   Last 24hrs VS reviewed since prior progress note. Most recent are:  Patient Vitals for the past 24 hrs:   Temp Pulse Resp BP SpO2   01/29/22 0827 98.4 °F (36.9 °C) (!) 113 22 122/65 98 %   01/29/22 0321 98.9 °F (37.2 °C) 97 13 (!) 95/55 99 %   01/28/22 2329 99.9 °F (37.7 °C) (!) 110 13 108/66 98 %   01/28/22 1913 98.9 °F (37.2 °C) 96 12 107/68 98 %   01/28/22 1420 98.8 °F (37.1 °C) 94 18 107/63 99 %   01/28/22 1052 100.4 °F (38 °C) (!) 121 16 (!) 113/49 98 %       Intake/Output Summary (Last 24 hours) at 1/29/2022 0940  Last data filed at 1/28/2022 1853  Gross per 24 hour   Intake 951.67 ml   Output 1050 ml   Net -98.33 ml        I had a face to face encounter and independently examined this patient on 1/29/2022, as outlined below:  PHYSICAL EXAM:  General: WD, WN. Alert, cooperative, no acute distress    EENT:  EOMI. Anicteric sclerae. MMM  Resp:  CTA bilaterally, no wheezing or rales. No accessory muscle use  CV:  Regular  rhythm,  No edema  GI:  Soft, Non distended, Non tender. +Bowel sounds  Neurologic:  Alert and oriented X 3, normal speech,   Psych:   Good insight. Not anxious nor agitated  Skin:  R Buttock pressure Ulcer noted +    Reviewed most current lab test results and cultures  YES  Reviewed most current radiology test results   YES  Review and summation of old records today    NO  Reviewed patient's current orders and MAR    YES  PMH/SH reviewed - no change compared to H&P  ________________________________________________________________________  Care Plan discussed with:    Comments   Patient x    Family      RN x    Care Manager     Consultant                     Multidiciplinary team rounds were held today with , nursing, pharmacist and clinical coordinator.   Patient's plan of care was discussed; medications were reviewed and discharge planning was addressed. ________________________________________________________________________  Total NON critical care TIME:  36   Minutes    Total CRITICAL CARE TIME Spent:   Minutes non procedure based      Comments   >50% of visit spent in counseling and coordination of care x    ________________________________________________________________________  Miracle Stokes MD     Procedures: see electronic medical records for all procedures/Xrays and details which were not copied into this note but were reviewed prior to creation of Plan. LABS:  I reviewed today's most current labs and imaging studies. Pertinent labs include:  Recent Labs     01/29/22  0632 01/28/22  0303 01/27/22  0525   WBC 17.6* 17.6* 16.4*   HGB 6.9* 7.1* 6.6*   HCT 23.8* 24.6* 23.1*   * 606* 590*     Recent Labs     01/29/22  0632 01/28/22  0303 01/27/22  0525 01/26/22  1520 01/26/22  1520    136 137   < > 134*   K 3.4* 3.4* 3.7   < > 3.9    107 107   < > 103   CO2 26 25 23   < > 23   GLU 81 82 88   < > 83   BUN 5* 5* 8   < > 13   CREA 0.27* 0.25* 0.38*   < > 0.57*   CA 7.7* 7.5* 8.0*   < > 8.2*   MG  --   --   --   --  2.4   ALB  --   --   --   --  1.6*   TBILI  --   --   --   --  0.9   ALT  --   --   --   --  8*    < > = values in this interval not displayed.        Signed: Miracle Stokes MD

## 2022-01-29 NOTE — PROGRESS NOTES
0700: End of Shift Note    Bedside shift change report given to Mandy Rivas RN (oncoming nurse) by Glenn Edge (offgoing nurse). Report included the following information SBAR, Kardex, Intake/Output, MAR, Recent Results and Cardiac Rhythm Sinus Tach    Shift worked:  Night     Shift summary and any significant changes:     Patient medicated for pain multiple times overnight. Wound care done once. Concerns for physician to address:       Zone phone for oncoming shift:          Activity:  Activity Level: Bed Rest  Number times ambulated in hallways past shift: 0  Number of times OOB to chair past shift: 0    Cardiac:   Cardiac Monitoring: Yes      Cardiac Rhythm: Sinus Tachy    Access:   Current line(s): PIV     Genitourinary:   Urinary status: self cath    Respiratory:   O2 Device: None (Room air)  Chronic home O2 use?: NO  Incentive spirometer at bedside: NO     GI:     Current diet:  ADULT DIET Regular  DIET ONE TIME MESSAGE  ADULT ORAL NUTRITION SUPPLEMENT Breakfast, Lunch, Dinner; Standard High Calorie/High Protein  ADULT ORAL NUTRITION SUPPLEMENT Lunch, Dinner; Frozen Supplement  Passing flatus: YES  Tolerating current diet: YES       Pain Management:   Patient states pain is manageable on current regimen: YES    Skin:  Dimitri Score: 13  Interventions: increase time out of bed and PT/OT consult    Patient Safety:  Fall Score:  Total Score: 1  Interventions: bed/chair alarm, gripper socks and pt to call before getting OOB  High Fall Risk: Yes    Length of Stay:  Expected LOS: - - -  Actual LOS: 3      Esther Lomas

## 2022-01-29 NOTE — PROGRESS NOTES
Problem: Pressure Injury - Risk of  Goal: *Prevention of pressure injury  Description: Document Dimitri Scale and appropriate interventions in the flowsheet.   Outcome: Progressing Towards Goal  Note: Pressure Injury Interventions:  Sensory Interventions: Assess changes in LOC,Minimize linen layers,Maintain/enhance activity level    Moisture Interventions: Absorbent underpads,Minimize layers    Activity Interventions: Pressure redistribution bed/mattress(bed type),PT/OT evaluation    Mobility Interventions: Pressure redistribution bed/mattress (bed type),PT/OT evaluation    Nutrition Interventions: Document food/fluid/supplement intake    Friction and Shear Interventions: Minimize layers

## 2022-01-30 LAB
ANION GAP SERPL CALC-SCNC: 5 MMOL/L (ref 5–15)
BACTERIA SPEC CULT: ABNORMAL
BUN SERPL-MCNC: 4 MG/DL (ref 6–20)
BUN/CREAT SERPL: 16 (ref 12–20)
CALCIUM SERPL-MCNC: 7.7 MG/DL (ref 8.5–10.1)
CHLORIDE SERPL-SCNC: 106 MMOL/L (ref 97–108)
CO2 SERPL-SCNC: 28 MMOL/L (ref 21–32)
CREAT SERPL-MCNC: 0.25 MG/DL (ref 0.7–1.3)
ERYTHROCYTE [DISTWIDTH] IN BLOOD BY AUTOMATED COUNT: 17 % (ref 11.5–14.5)
GLUCOSE SERPL-MCNC: 83 MG/DL (ref 65–100)
HCT VFR BLD AUTO: 25.8 % (ref 36.6–50.3)
HGB BLD-MCNC: 7.5 G/DL (ref 12.1–17)
MCH RBC QN AUTO: 24.1 PG (ref 26–34)
MCHC RBC AUTO-ENTMCNC: 29.1 G/DL (ref 30–36.5)
MCV RBC AUTO: 83 FL (ref 80–99)
NRBC # BLD: 0.02 K/UL (ref 0–0.01)
NRBC BLD-RTO: 0.2 PER 100 WBC
PLATELET # BLD AUTO: 690 K/UL (ref 150–400)
PMV BLD AUTO: 8.3 FL (ref 8.9–12.9)
POTASSIUM SERPL-SCNC: 3.6 MMOL/L (ref 3.5–5.1)
RBC # BLD AUTO: 3.11 M/UL (ref 4.1–5.7)
SERVICE CMNT-IMP: ABNORMAL
SERVICE CMNT-IMP: ABNORMAL
SODIUM SERPL-SCNC: 139 MMOL/L (ref 136–145)
WBC # BLD AUTO: 12.5 K/UL (ref 4.1–11.1)

## 2022-01-30 PROCEDURE — 74011250637 HC RX REV CODE- 250/637: Performed by: STUDENT IN AN ORGANIZED HEALTH CARE EDUCATION/TRAINING PROGRAM

## 2022-01-30 PROCEDURE — 65660000001 HC RM ICU INTERMED STEPDOWN

## 2022-01-30 PROCEDURE — 74011250636 HC RX REV CODE- 250/636: Performed by: STUDENT IN AN ORGANIZED HEALTH CARE EDUCATION/TRAINING PROGRAM

## 2022-01-30 PROCEDURE — 74011000258 HC RX REV CODE- 258: Performed by: STUDENT IN AN ORGANIZED HEALTH CARE EDUCATION/TRAINING PROGRAM

## 2022-01-30 PROCEDURE — 74011250636 HC RX REV CODE- 250/636: Performed by: INTERNAL MEDICINE

## 2022-01-30 PROCEDURE — 36415 COLL VENOUS BLD VENIPUNCTURE: CPT

## 2022-01-30 PROCEDURE — 74011250637 HC RX REV CODE- 250/637: Performed by: INTERNAL MEDICINE

## 2022-01-30 PROCEDURE — 80048 BASIC METABOLIC PNL TOTAL CA: CPT

## 2022-01-30 PROCEDURE — 74011000250 HC RX REV CODE- 250: Performed by: STUDENT IN AN ORGANIZED HEALTH CARE EDUCATION/TRAINING PROGRAM

## 2022-01-30 PROCEDURE — 2709999900 HC NON-CHARGEABLE SUPPLY

## 2022-01-30 PROCEDURE — 85027 COMPLETE CBC AUTOMATED: CPT

## 2022-01-30 RX ADMIN — CEFEPIME HYDROCHLORIDE 1 G: 1 INJECTION, POWDER, FOR SOLUTION INTRAMUSCULAR; INTRAVENOUS at 06:44

## 2022-01-30 RX ADMIN — CEFEPIME HYDROCHLORIDE 1 G: 1 INJECTION, POWDER, FOR SOLUTION INTRAMUSCULAR; INTRAVENOUS at 18:06

## 2022-01-30 RX ADMIN — OXYCODONE AND ACETAMINOPHEN 1 TABLET: 10; 325 TABLET ORAL at 18:02

## 2022-01-30 RX ADMIN — OXYCODONE AND ACETAMINOPHEN 1 TABLET: 10; 325 TABLET ORAL at 12:27

## 2022-01-30 RX ADMIN — OXYCODONE AND ACETAMINOPHEN 1 TABLET: 10; 325 TABLET ORAL at 08:23

## 2022-01-30 RX ADMIN — SODIUM CHLORIDE, PRESERVATIVE FREE 10 ML: 5 INJECTION INTRAVENOUS at 22:01

## 2022-01-30 RX ADMIN — METRONIDAZOLE 500 MG: 250 TABLET ORAL at 12:37

## 2022-01-30 RX ADMIN — CEFEPIME HYDROCHLORIDE 1 G: 1 INJECTION, POWDER, FOR SOLUTION INTRAMUSCULAR; INTRAVENOUS at 22:01

## 2022-01-30 RX ADMIN — NYSTATIN 5 ML: 100000 SUSPENSION ORAL at 13:01

## 2022-01-30 RX ADMIN — Medication 1 CAPSULE: at 09:45

## 2022-01-30 RX ADMIN — POTASSIUM CHLORIDE AND SODIUM CHLORIDE: 900; 150 INJECTION, SOLUTION INTRAVENOUS at 09:44

## 2022-01-30 RX ADMIN — VANCOMYCIN HYDROCHLORIDE 1000 MG: 1 INJECTION, POWDER, LYOPHILIZED, FOR SOLUTION INTRAVENOUS at 18:58

## 2022-01-30 RX ADMIN — VANCOMYCIN HYDROCHLORIDE 1000 MG: 1 INJECTION, POWDER, LYOPHILIZED, FOR SOLUTION INTRAVENOUS at 09:45

## 2022-01-30 RX ADMIN — METRONIDAZOLE 500 MG: 250 TABLET ORAL at 09:45

## 2022-01-30 RX ADMIN — OXYCODONE AND ACETAMINOPHEN 1 TABLET: 10; 325 TABLET ORAL at 22:01

## 2022-01-30 RX ADMIN — NYSTATIN 5 ML: 100000 SUSPENSION ORAL at 18:03

## 2022-01-30 RX ADMIN — FOLIC ACID 1 MG: 1 TABLET ORAL at 09:45

## 2022-01-30 RX ADMIN — OXYCODONE AND ACETAMINOPHEN 1 TABLET: 10; 325 TABLET ORAL at 02:35

## 2022-01-30 RX ADMIN — DAKIN'S SOLUTION 0.125% (QUARTER STRENGTH): 0.12 SOLUTION at 09:54

## 2022-01-30 RX ADMIN — SODIUM CHLORIDE, PRESERVATIVE FREE 10 ML: 5 INJECTION INTRAVENOUS at 16:05

## 2022-01-30 RX ADMIN — SODIUM CHLORIDE, PRESERVATIVE FREE 10 ML: 5 INJECTION INTRAVENOUS at 06:44

## 2022-01-30 NOTE — PROGRESS NOTES
Hospitalist Progress Note    NAME: Amara Forbes   :  1995   MRN:  [id number]       Assessment / Plan:  Streptococcal & Staphylococcal sp Bacteremia POA-  EColi UTI POA  Causing severe Sepsis POA- improving, WBC still at 17k but stable,  fever trended down  Stage IV sacral decubitus ulcers POA  Paraplegia-wheelchair-bound  Oropharyngeal Candidiasis ()  CT abdomen/pelvis=  1. Extensive sacral decubitus soft tissue wounds with a large collection of  fluid and air in the right posterior soft tissues. There is sclerosis and  remodeling of the posterior ischii and sacrum. Osteomyelitis is not excluded.     2. Urinary bladder air can be seen with catheterization or infection    Follow Wound cultures, Blood culture & Urine cx  Repeat blood cx ()= neg in 48 hrs  Cont empiric IV ABx- vancomycin, cefepime and Flagyl for now  Cont Probiotic  IP Wound care consulted- following  IP General surgery consulted- following, s/p Bedside Debridement in ER and at bedside  noted, will await follow up on Monday for DC planning recommendations  -Case management consulted for the possible needs at home. Cont IV fluids and pain management. Nystatin S/S today     Normocytic anemia  -Hemoglobin 7.8- >7.1->6.6->7.1->6.9 - >Pending today  Ordered iron profile with ferritin, B12 and folate. Transfused 1 PRBC ()  Follow CBC daily--   No transfusion today for now       Code Status: Full code  Surrogate Decision Maker: girl friend? ?     DVT Prophylaxis: Lovenox  GI Prophylaxis: not indicated     Baseline: Wheelchair-bound at home    Estimated discharge date: ? Barriers: home DMEs, surgical clearance    Recommended Disposition: Home w/Family and HH PT, OT, RN likely for wound care     Subjective:     Chief Complaint / Reason for Physician Visit : F/U sepsis, bacteremia, Decubitus ulcers, UTI, Paraplegia, oral  Thursh  \"I am feeling better today\". Discussed with RN events overnight.      Review of Systems:  Symptom Y/N Comments  Symptom Y/N Comments   Fever/Chills n   Chest Pain n    Poor Appetite n   Edema n    Cough n   Abdominal Pain n    Sputum n   Joint Pain n    SOB/BILLINGS n   Pruritis/Rash     Nausea/vomit    Tolerating PT/OT n    Diarrhea    Tolerating Diet y    Constipation    Other       Could NOT obtain due to:      Objective:     VITALS:   Last 24hrs VS reviewed since prior progress note. Most recent are:  Patient Vitals for the past 24 hrs:   Temp Pulse Resp BP SpO2   01/30/22 0803 -- 88 10 122/70 --   01/30/22 0252 98 °F (36.7 °C) 93 16 (!) 121/95 93 %   01/29/22 2318 98.4 °F (36.9 °C) 98 17 112/72 100 %   01/29/22 1904 99.4 °F (37.4 °C) 83 12 119/78 98 %   01/29/22 1529 -- (!) 101 18 -- 98 %   01/29/22 1142 99.1 °F (37.3 °C) (!) 110 20 106/84 98 %       Intake/Output Summary (Last 24 hours) at 1/30/2022 1051  Last data filed at 1/30/2022 0803  Gross per 24 hour   Intake 360 ml   Output 2250 ml   Net -1890 ml        I had a face to face encounter and independently examined this patient on 1/30/2022, as outlined below:  PHYSICAL EXAM:  General: WD, WN. Alert, cooperative, no acute distress    EENT:  EOMI. Anicteric sclerae. MMM  Resp:  CTA bilaterally, no wheezing or rales. No accessory muscle use  CV:  Regular  rhythm,  No edema  GI:  Soft, Non distended, Non tender. +Bowel sounds  Neurologic:  Alert and oriented X 3, normal speech,   Psych:   Good insight.  Not anxious nor agitated  Skin:  R Buttock pressure Ulcer noted +    Reviewed most current lab test results and cultures  YES  Reviewed most current radiology test results   YES  Review and summation of old records today    NO  Reviewed patient's current orders and MAR    YES  PMH/SH reviewed - no change compared to H&P  ________________________________________________________________________  Care Plan discussed with:    Comments   Patient x    Family      RN x    Care Manager     Consultant                     Multidiciplinary team rounds were held today with , nursing, pharmacist and clinical coordinator. Patient's plan of care was discussed; medications were reviewed and discharge planning was addressed. ________________________________________________________________________  Total NON critical care TIME:  36   Minutes    Total CRITICAL CARE TIME Spent:   Minutes non procedure based      Comments   >50% of visit spent in counseling and coordination of care x    ________________________________________________________________________  Anuj Cadet MD     Procedures: see electronic medical records for all procedures/Xrays and details which were not copied into this note but were reviewed prior to creation of Plan. LABS:  I reviewed today's most current labs and imaging studies.   Pertinent labs include:  Recent Labs     01/29/22  0632 01/28/22  0303   WBC 17.6* 17.6*   HGB 6.9* 7.1*   HCT 23.8* 24.6*   * 606*     Recent Labs     01/30/22  0439 01/29/22  0632 01/28/22  0303    137 136   K 3.6 3.4* 3.4*    105 107   CO2 28 26 25   GLU 83 81 82   BUN 4* 5* 5*   CREA 0.25* 0.27* 0.25*   CA 7.7* 7.7* 7.5*       Signed: Anuj Cadet MD

## 2022-01-30 NOTE — PROGRESS NOTES
Problem: Pressure Injury - Risk of  Goal: *Prevention of pressure injury  Description: Document Dimitri Scale and appropriate interventions in the flowsheet. Outcome: Progressing Towards Goal  Note: Pressure Injury Interventions:  Sensory Interventions: Assess changes in LOC    Moisture Interventions: Absorbent underpads    Activity Interventions: Pressure redistribution bed/mattress(bed type)    Mobility Interventions: Float heels,HOB 30 degrees or less    Nutrition Interventions: Document food/fluid/supplement intake    Friction and Shear Interventions: Apply protective barrier, creams and emollients,Lift sheet                Problem: Pressure Injury - Risk of  Goal: *Prevention of pressure injury  Description: Document Dimitri Scale and appropriate interventions in the flowsheet.   Outcome: Progressing Towards Goal  Note: Pressure Injury Interventions:  Sensory Interventions: Assess changes in LOC    Moisture Interventions: Absorbent underpads    Activity Interventions: Pressure redistribution bed/mattress(bed type)    Mobility Interventions: Float heels,HOB 30 degrees or less    Nutrition Interventions: Document food/fluid/supplement intake    Friction and Shear Interventions: Apply protective barrier, creams and emollients,Lift sheet                Problem: Patient Education: Go to Patient Education Activity  Goal: Patient/Family Education  Outcome: Progressing Towards Goal     Problem: Patient Education: Go to Patient Education Activity  Goal: Patient/Family Education  Outcome: Progressing Towards Goal

## 2022-01-30 NOTE — PROGRESS NOTES
End of Shift Note    Bedside shift change report given to Raymond Kerr RN (oncoming nurse) by Juan Rios RN (offgoing nurse). Report included the following information SBAR and Recent Results    Shift worked:  Days     Shift summary and any significant changes:     Patient has severe pain due to pressure injuries. Gave pain medicine multiple times during shift. Wound care performed. Concerns for physician to address:       Zone phone for oncoming shift:          Activity:  Activity Level: Bed Rest  Number times ambulated in hallways past shift: 0  Number of times OOB to chair past shift: 0    Cardiac:   Cardiac Monitoring: Yes      Cardiac Rhythm: Sinus Tachy    Access:   Current line(s): PIV     Genitourinary:   Urinary status: self cath    Respiratory:   O2 Device: None (Room air)  Chronic home O2 use?: NO  Incentive spirometer at bedside: NO     GI:  Last Bowel Movement Date: 01/29/22  Current diet:  ADULT DIET Regular  DIET ONE TIME MESSAGE  ADULT ORAL NUTRITION SUPPLEMENT Breakfast, Lunch, Dinner; Standard High Calorie/High Protein  ADULT ORAL NUTRITION SUPPLEMENT Lunch, Dinner; Frozen Supplement  Passing flatus: YES  Tolerating current diet: YES       Pain Management:   Patient states pain is manageable on current regimen: YES    Skin:  Dimitri Score: 14  Interventions: nutritional support     Patient Safety:  Fall Score:  Total Score: 2  Interventions: pt to call before getting OOB  High Fall Risk: Yes    Length of Stay:  Expected LOS: - - -  Actual LOS: [address], RN

## 2022-01-30 NOTE — PROGRESS NOTES
0700: Bedside shift change report given to Pippa Lee RN (oncoming nurse) by Kenna Senior RN (offgoing nurse). Report included the following information SBAR, Kardex, Intake/Output, MAR, Recent Results and Cardiac Rhythm NSR.    1007: TRANSFER - OUT REPORT:    Verbal report given to Agata Baker RN(name) on Charity Martinez  being transferred to [facility] (unit) for environmental cleaning. Report consisted of patients Situation, Background, Assessment and   Recommendations(SBAR). Information from the following report(s) SBAR, Kardex, Intake/Output, MAR, Recent Results and Cardiac Rhythm NSR was reviewed with the receiving nurse. Lines:   Peripheral IV 01/28/22 Anterior;Right Forearm (Active)   Site Assessment Clean, dry, & intact 01/30/22 0729   Phlebitis Assessment 0 01/30/22 0729   Infiltration Assessment 0 01/30/22 0729   Dressing Status Clean, dry, & intact 01/30/22 0729   Dressing Type Tape;Transparent 01/30/22 0729   Hub Color/Line Status Blue; Infusing;Flushed 01/30/22 0729   Action Taken Open ports on tubing capped 01/30/22 0729   Alcohol Cap Used Yes 01/30/22 0729        Opportunity for questions and clarification was provided.       Patient transported with:   Monitor  Registered Nurse

## 2022-01-31 PROBLEM — I47.29 NSVT (NONSUSTAINED VENTRICULAR TACHYCARDIA) (HCC): Status: ACTIVE | Noted: 2022-01-31

## 2022-01-31 LAB
ANION GAP SERPL CALC-SCNC: 4 MMOL/L (ref 5–15)
BACTERIA SPEC CULT: NORMAL
BUN SERPL-MCNC: 4 MG/DL (ref 6–20)
BUN/CREAT SERPL: 16 (ref 12–20)
CALCIUM SERPL-MCNC: 8 MG/DL (ref 8.5–10.1)
CHLORIDE SERPL-SCNC: 107 MMOL/L (ref 97–108)
CO2 SERPL-SCNC: 26 MMOL/L (ref 21–32)
CREAT SERPL-MCNC: 0.25 MG/DL (ref 0.7–1.3)
DATE LAST DOSE: ABNORMAL
ERYTHROCYTE [DISTWIDTH] IN BLOOD BY AUTOMATED COUNT: 17.8 % (ref 11.5–14.5)
GLUCOSE SERPL-MCNC: 80 MG/DL (ref 65–100)
HCT VFR BLD AUTO: 27.7 % (ref 36.6–50.3)
HGB BLD-MCNC: 7.8 G/DL (ref 12.1–17)
MCH RBC QN AUTO: 23.8 PG (ref 26–34)
MCHC RBC AUTO-ENTMCNC: 28.2 G/DL (ref 30–36.5)
MCV RBC AUTO: 84.5 FL (ref 80–99)
NRBC # BLD: 0.03 K/UL (ref 0–0.01)
NRBC BLD-RTO: 0.3 PER 100 WBC
PLATELET # BLD AUTO: 705 K/UL (ref 150–400)
PMV BLD AUTO: 8.3 FL (ref 8.9–12.9)
POTASSIUM SERPL-SCNC: 3.8 MMOL/L (ref 3.5–5.1)
RBC # BLD AUTO: 3.28 M/UL (ref 4.1–5.7)
REPORTED DOSE,DOSE: ABNORMAL UNITS
REPORTED DOSE/TIME,TMG: ABNORMAL
SERVICE CMNT-IMP: NORMAL
SODIUM SERPL-SCNC: 137 MMOL/L (ref 136–145)
VANCOMYCIN TROUGH SERPL-MCNC: 13.8 UG/ML (ref 5–10)
WBC # BLD AUTO: 10.2 K/UL (ref 4.1–11.1)

## 2022-01-31 PROCEDURE — 77030018786 HC NDL GD F/USND BARD -B

## 2022-01-31 PROCEDURE — 74011250636 HC RX REV CODE- 250/636: Performed by: INTERNAL MEDICINE

## 2022-01-31 PROCEDURE — APPSS180 APP SPLIT SHARED TIME > 60 MINUTES: Performed by: NURSE PRACTITIONER

## 2022-01-31 PROCEDURE — 76937 US GUIDE VASCULAR ACCESS: CPT

## 2022-01-31 PROCEDURE — C1751 CATH, INF, PER/CENT/MIDLINE: HCPCS

## 2022-01-31 PROCEDURE — 2709999900 HC NON-CHARGEABLE SUPPLY

## 2022-01-31 PROCEDURE — 99231 SBSQ HOSP IP/OBS SF/LOW 25: CPT | Performed by: SURGERY

## 2022-01-31 PROCEDURE — 74011000250 HC RX REV CODE- 250: Performed by: SURGERY

## 2022-01-31 PROCEDURE — 80048 BASIC METABOLIC PNL TOTAL CA: CPT

## 2022-01-31 PROCEDURE — 74011000258 HC RX REV CODE- 258: Performed by: STUDENT IN AN ORGANIZED HEALTH CARE EDUCATION/TRAINING PROGRAM

## 2022-01-31 PROCEDURE — 65660000001 HC RM ICU INTERMED STEPDOWN

## 2022-01-31 PROCEDURE — 74011000258 HC RX REV CODE- 258: Performed by: INTERNAL MEDICINE

## 2022-01-31 PROCEDURE — 74011250636 HC RX REV CODE- 250/636: Performed by: STUDENT IN AN ORGANIZED HEALTH CARE EDUCATION/TRAINING PROGRAM

## 2022-01-31 PROCEDURE — 80202 ASSAY OF VANCOMYCIN: CPT

## 2022-01-31 PROCEDURE — 74011000250 HC RX REV CODE- 250: Performed by: INTERNAL MEDICINE

## 2022-01-31 PROCEDURE — 99223 1ST HOSP IP/OBS HIGH 75: CPT | Performed by: INTERNAL MEDICINE

## 2022-01-31 PROCEDURE — 36573 INSJ PICC RS&I 5 YR+: CPT | Performed by: INTERNAL MEDICINE

## 2022-01-31 PROCEDURE — 74011000250 HC RX REV CODE- 250: Performed by: STUDENT IN AN ORGANIZED HEALTH CARE EDUCATION/TRAINING PROGRAM

## 2022-01-31 PROCEDURE — 74011250637 HC RX REV CODE- 250/637: Performed by: INTERNAL MEDICINE

## 2022-01-31 PROCEDURE — 74011250637 HC RX REV CODE- 250/637: Performed by: STUDENT IN AN ORGANIZED HEALTH CARE EDUCATION/TRAINING PROGRAM

## 2022-01-31 PROCEDURE — 85027 COMPLETE CBC AUTOMATED: CPT

## 2022-01-31 RX ORDER — METOPROLOL TARTRATE 25 MG/1
12.5 TABLET, FILM COATED ORAL 2 TIMES DAILY
Status: DISCONTINUED | OUTPATIENT
Start: 2022-01-31 | End: 2022-01-31

## 2022-01-31 RX ORDER — SODIUM CHLORIDE 9 MG/ML
125 INJECTION, SOLUTION INTRAVENOUS CONTINUOUS
Status: DISCONTINUED | OUTPATIENT
Start: 2022-01-31 | End: 2022-02-01

## 2022-01-31 RX ORDER — BACITRACIN 500 UNIT/G
1 PACKET (EA) TOPICAL AS NEEDED
Status: DISCONTINUED | OUTPATIENT
Start: 2022-01-31 | End: 2022-02-02 | Stop reason: HOSPADM

## 2022-01-31 RX ORDER — METOPROLOL TARTRATE 5 MG/5ML
2.5 INJECTION INTRAVENOUS ONCE
Status: COMPLETED | OUTPATIENT
Start: 2022-01-31 | End: 2022-01-31

## 2022-01-31 RX ORDER — HEPARIN 100 UNIT/ML
300 SYRINGE INTRAVENOUS AS NEEDED
Status: DISCONTINUED | OUTPATIENT
Start: 2022-01-31 | End: 2022-02-02 | Stop reason: HOSPADM

## 2022-01-31 RX ORDER — METRONIDAZOLE 250 MG/1
500 TABLET ORAL EVERY 12 HOURS
Status: DISCONTINUED | OUTPATIENT
Start: 2022-01-31 | End: 2022-02-01

## 2022-01-31 RX ORDER — METOPROLOL TARTRATE 25 MG/1
12.5 TABLET, FILM COATED ORAL 2 TIMES DAILY
Status: DISCONTINUED | OUTPATIENT
Start: 2022-01-31 | End: 2022-02-02 | Stop reason: HOSPADM

## 2022-01-31 RX ORDER — LEVOFLOXACIN 5 MG/ML
750 INJECTION, SOLUTION INTRAVENOUS EVERY 24 HOURS
Status: DISCONTINUED | OUTPATIENT
Start: 2022-01-31 | End: 2022-02-01

## 2022-01-31 RX ADMIN — SODIUM CHLORIDE 125 ML/HR: 9 INJECTION, SOLUTION INTRAVENOUS at 12:52

## 2022-01-31 RX ADMIN — OXYCODONE AND ACETAMINOPHEN 1 TABLET: 10; 325 TABLET ORAL at 08:13

## 2022-01-31 RX ADMIN — LEVOFLOXACIN 750 MG: 5 INJECTION, SOLUTION INTRAVENOUS at 12:11

## 2022-01-31 RX ADMIN — OXYCODONE AND ACETAMINOPHEN 1 TABLET: 10; 325 TABLET ORAL at 16:16

## 2022-01-31 RX ADMIN — NYSTATIN 5 ML: 100000 SUSPENSION ORAL at 16:28

## 2022-01-31 RX ADMIN — OXYCODONE AND ACETAMINOPHEN 1 TABLET: 10; 325 TABLET ORAL at 20:25

## 2022-01-31 RX ADMIN — MORPHINE SULFATE 2 MG: 2 INJECTION, SOLUTION INTRAMUSCULAR; INTRAVENOUS at 15:08

## 2022-01-31 RX ADMIN — FOLIC ACID 1 MG: 1 TABLET ORAL at 08:13

## 2022-01-31 RX ADMIN — SODIUM CHLORIDE, PRESERVATIVE FREE 10 ML: 5 INJECTION INTRAVENOUS at 23:03

## 2022-01-31 RX ADMIN — DAKIN'S SOLUTION 0.125% (QUARTER STRENGTH): 0.12 SOLUTION at 08:13

## 2022-01-31 RX ADMIN — METOPROLOL TARTRATE 12.5 MG: 25 TABLET, FILM COATED ORAL at 16:16

## 2022-01-31 RX ADMIN — CEFEPIME HYDROCHLORIDE 1 G: 1 INJECTION, POWDER, FOR SOLUTION INTRAMUSCULAR; INTRAVENOUS at 08:13

## 2022-01-31 RX ADMIN — CEFEPIME HYDROCHLORIDE 1 G: 1 INJECTION, POWDER, FOR SOLUTION INTRAMUSCULAR; INTRAVENOUS at 06:23

## 2022-01-31 RX ADMIN — NYSTATIN 5 ML: 100000 SUSPENSION ORAL at 06:27

## 2022-01-31 RX ADMIN — SODIUM CHLORIDE, PRESERVATIVE FREE 10 ML: 5 INJECTION INTRAVENOUS at 06:55

## 2022-01-31 RX ADMIN — Medication 1 CAPSULE: at 08:13

## 2022-01-31 RX ADMIN — METRONIDAZOLE 500 MG: 250 TABLET ORAL at 08:13

## 2022-01-31 RX ADMIN — METRONIDAZOLE 500 MG: 250 TABLET ORAL at 20:25

## 2022-01-31 RX ADMIN — NYSTATIN 5 ML: 100000 SUSPENSION ORAL at 00:16

## 2022-01-31 RX ADMIN — OXYCODONE AND ACETAMINOPHEN 1 TABLET: 10; 325 TABLET ORAL at 02:48

## 2022-01-31 RX ADMIN — OXYCODONE AND ACETAMINOPHEN 1 TABLET: 10; 325 TABLET ORAL at 12:11

## 2022-01-31 RX ADMIN — NYSTATIN 5 ML: 100000 SUSPENSION ORAL at 23:02

## 2022-01-31 RX ADMIN — METOPROLOL TARTRATE 2.5 MG: 5 INJECTION INTRAVENOUS at 12:52

## 2022-01-31 RX ADMIN — NYSTATIN 5 ML: 100000 SUSPENSION ORAL at 12:11

## 2022-01-31 RX ADMIN — VANCOMYCIN HYDROCHLORIDE 1000 MG: 1 INJECTION, POWDER, LYOPHILIZED, FOR SOLUTION INTRAVENOUS at 00:39

## 2022-01-31 RX ADMIN — VANCOMYCIN HYDROCHLORIDE 1000 MG: 1 INJECTION, POWDER, LYOPHILIZED, FOR SOLUTION INTRAVENOUS at 08:14

## 2022-01-31 RX ADMIN — HYDROMORPHONE HYDROCHLORIDE 0.5 MG: 1 INJECTION, SOLUTION INTRAMUSCULAR; INTRAVENOUS; SUBCUTANEOUS at 19:15

## 2022-01-31 NOTE — PROGRESS NOTES
2/1/22 Junior 5 follow-up PCP transitional care appointment has been scheduled with NP Isabela Bell on 2/8/22 at 1430. Pending patient discharge. [place] Patient, Care Management Specialist     Attempted to schedule hospital follow up PCP appointment. Trying to get the patient a NEW PATIENT appointment with [facility]. Awaiting callback from [facility]. Pending patient discharge.  [place] Patient, Care Management Specialist

## 2022-01-31 NOTE — PROGRESS NOTES
PICC (Peripherally Inserted Central Catheter) line insertion  procedure note :     Procedure explained to patient along with risks and benefits  and patient agreed to proceed. Informed   consent obtained from  patient. Patient teaching completed. Timeout completed. Pre-procedure assessment done. Maximum sterile barrier precautions observed throughout procedure. Lidocaine 1%  2.0    ml sq given prior to cannulation. Cannulated basilic  vein using ultrasound guidance and modified seldinger technique. Inserted 4  [place] single  lumen PICC to right arm using Conjectur Tip Location System and  [place] 1898. Pt has    sinus   rhythm. PICC tip location was confirmed by 3 CG   tip positioning system, indicating tall P wave and no negative deflection before P wave which would indicate that the PICC tip is properly placed in the distal SVC or at the [place]. PICC tip location was  confirmed by 2 PICC nurses and printout placed on patient's chart. Blood return verified and flushed with 20 ml normal saline in each port. Sterile dressing applied with biopatch, statLock and occlusive dressing as per protocol. Curos caps applied to each port. Patient tolerated procedure well with minimal blood loss ( less than 5 ml.)  PICC procedure performed by  :  Frankie Donnelly RN. JOSEPH. CMSRN. PICC nurse. Vascular Access Team.   Assisted by :   Selwyn Anne RN  PICC nurse  Reason for access : reliable access / MD order /   Long term IV medication administration for 4 weeks  / Discharge with PICC line   Complications related to insertion  : none  X-Ray : not applicable  Notified primary nurse  Aleena Garcia RN  that  PICC line can be used. Trimmed Length :  39   cm   External Length :   At the [facility]     PICC line site arm circumference:    26    cm   PICC catheter occupies   16   % of vein  Type of PICC: Bard Solo Power PICC    Ref # :    [id number]     Lot # :  [id number]  Expiration Date :    2023-02-28 Evie Reardon RN. BSN. JOSEPH,CMSRN.  PICC Nurse, Vascular Access Team.

## 2022-01-31 NOTE — PROGRESS NOTES
Patient denies pain. I removed the dressing. There was no packing in the tunneling area. The tissues are all viable and there is minimal turbid fluid when the tunneling areas are probed with a finger. I discussed the need to pack into the tunneling areas with the floor nurse. This should be done twice daily and the tunneling area should be irrigated before repacking. Assessment and plan: Chronic right greater trochanter pressure wound. Adequately debrided. Continue twice daily packing changes being careful to get packing into the undermining areas that extend cephalad and anteriorly. The patient should have follow-up arranged with the wound care center as this is a chronic wound and will need long-term care. He does not need to see me in the office. Will sign off. Please call with concerns.

## 2022-01-31 NOTE — PROGRESS NOTES
Hospitalist Progress Note    NAME: Elizabeth Penny   :  1995   MRN:  [id number]       Assessment / Plan:  Streptococcal & Staphylococcal sp Bacteremia POA-  EColi UTI POA  Causing severe Sepsis POA- improving, WBC down to 10k today,  Afebrile now in past 48 hrs  Stage IV sacral decubitus ulcers POA  Paraplegia-wheelchair-bound  Oropharyngeal Candidiasis ()  CT abdomen/pelvis=  1. Extensive sacral decubitus soft tissue wounds with a large collection of  fluid and air in the right posterior soft tissues. There is sclerosis and  remodeling of the posterior ischii and sacrum. Osteomyelitis is not excluded.     2. Urinary bladder air can be seen with catheterization or infection    Follow Wound cultures, Blood culture & Urine cx  Repeat blood cx ()= neg in 3 days  Cont empiric IV ABx- vancomycin, cefepime and Flagyl for now, will need atleast 2-4 weeks of IV Abx on discharge per Surgery recommendations today-- will get PICC line today  Cont Probiotic  IP Wound care consulted- following  IP General surgery consulted- following, s/p Bedside Debridement in ER and at bedside  noted, will await follow up today for DC planning recommendations  -Case management consulted for the possible needs at home. Cont IV fluids and pain management. Cont Nystatin S/S       Normocytic anemia  -Hemoglobin 7.8- >7.1->6.6->7.1->6.9 - >7.5->7.8 today  Ordered iron profile with ferritin, B12 and folate. S/p Transfused 1 PRBC ()  Follow CBC daily-- Hb trended up today  No transfusion now       Code Status: Full code  Surrogate Decision Maker: girl friend? ?     DVT Prophylaxis: Lovenox  GI Prophylaxis: not indicated     Baseline: Wheelchair-bound at home    Estimated discharge date:   Barriers: home DMEs, surgical clearance? , PICC line for IV Abx at home    Recommended Disposition: Home w/Family and HH PT, OT, RN likely for wound care & IV Abx likely     Subjective:     Chief Complaint / Reason for Physician Visit : F/U sepsis, bacteremia, Decubitus ulcers, UTI, Paraplegia, oral  Thursh  \"I am ok\". Discussed with RN events overnight. Review of Systems:  Symptom Y/N Comments  Symptom Y/N Comments   Fever/Chills n   Chest Pain n    Poor Appetite n   Edema n    Cough n   Abdominal Pain n    Sputum n   Joint Pain n    SOB/BILLINGS n   Pruritis/Rash     Nausea/vomit    Tolerating PT/OT n    Diarrhea    Tolerating Diet y    Constipation    Other       Could NOT obtain due to:      Objective:     VITALS:   Last 24hrs VS reviewed since prior progress note. Most recent are:  Patient Vitals for the past 24 hrs:   Temp Pulse Resp BP SpO2   01/31/22 0747 98.9 °F (37.2 °C) 95 18 114/73 94 %   01/31/22 0254 98 °F (36.7 °C) 89 20 110/72 93 %   01/30/22 2249 98.4 °F (36.9 °C) 83 14 115/82 94 %   01/30/22 1935 98.6 °F (37 °C) 91 14 109/68 95 %   01/30/22 1606 97.9 °F (36.6 °C) 78 13 94/63 --   01/30/22 1049 98.6 °F (37 °C) (!) 109 11 111/62 --       Intake/Output Summary (Last 24 hours) at 1/31/2022 0950  Last data filed at 1/31/2022 5505  Gross per 24 hour   Intake --   Output 900 ml   Net -900 ml        I had a face to face encounter and independently examined this patient on 1/31/2022, as outlined below:  PHYSICAL EXAM:  General: WD, WN. Alert, cooperative, no acute distress    EENT:  EOMI. Anicteric sclerae. MMM  Resp:  CTA bilaterally, no wheezing or rales. No accessory muscle use  CV:  Regular  rhythm,  No edema  GI:  Soft, Non distended, Non tender. +Bowel sounds  Neurologic:  Alert and oriented X 3, normal speech,   Psych:   Good insight.  Not anxious nor agitated  Skin:  R Buttock pressure Ulcer noted +    Reviewed most current lab test results and cultures  YES  Reviewed most current radiology test results   YES  Review and summation of old records today    NO  Reviewed patient's current orders and MAR    YES  PMH/SH reviewed - no change compared to H&P  ________________________________________________________________________  Care Plan discussed with:    Comments   Patient x    Family      RN x    Care Manager x    Consultant                    x Multidiciplinary team rounds were held today with , nursing, pharmacist and clinical coordinator. Patient's plan of care was discussed; medications were reviewed and discharge planning was addressed. ________________________________________________________________________  Total NON critical care TIME:  36   Minutes    Total CRITICAL CARE TIME Spent:   Minutes non procedure based      Comments   >50% of visit spent in counseling and coordination of care x    ________________________________________________________________________  Delmi Hummel MD     Procedures: see electronic medical records for all procedures/Xrays and details which were not copied into this note but were reviewed prior to creation of Plan. LABS:  I reviewed today's most current labs and imaging studies.   Pertinent labs include:  Recent Labs     01/31/22  0636 01/30/22  1229 01/29/22  0632   WBC 10.2 12.5* 17.6*   HGB 7.8* 7.5* 6.9*   HCT 27.7* 25.8* 23.8*   * 690* 589*     Recent Labs     01/31/22  0636 01/30/22  0439 01/29/22  0632    139 137   K 3.8 3.6 3.4*    106 105   CO2 26 28 26   GLU 80 83 81   BUN 4* 4* 5*   CREA 0.25* 0.25* 0.27*   CA 8.0* 7.7* 7.7*       Signed: Delmi Hummel MD

## 2022-01-31 NOTE — PROGRESS NOTES
Transition of Care Plan:     RUR:14% moderate risk  Disposition: home with HH (IV antibiotics)  Follow up appointments:pt needs new PCP and specialists as indicated  DME needed:pt has a w/c  Transportation at Discharge: shree will transport  [employer] or [employer] to access home: pt has access       IM Medicare Letter:n/a  Is patient a BCPI-A Bundle:n/a                      If yes, was Bundle Letter given?:    Is patient a  and connected with the [place]? If yes, was [employer] transfer form completed and [facility] notified? Caregiver Contact:aunt Keenan Padron [phone]  Discharge Caregiver contacted prior to discharge? Care Conference needed?:n/a    2:54pm- CM sent referral to CM Specialist to schedule pt a new PCP appointment. CM will continue to follow patient for discharge planning needs and arrange for services as deemed necessary.     Marlyn Sanz [address] [place] [address]  [phone]

## 2022-01-31 NOTE — PROGRESS NOTES
Pharmacy Antimicrobial Kinetic Dosing    Indication for Antimicrobials: SSTI  / Sepsis / UTI    Current Regimen of Each Antimicrobial:  Vancomycin 1000mg IV q8h (Start Date ; Day # )  Metronidazole 500mg PO TID (; day 5)  Cefepime 1g IV q8h (start ; day 5)    Previous Antimicrobial Therapy:    Vancomycin Goal Level: AUC: 400-600 mg/hr/Liter/day    Date Dose & Interval Measured (mcg/mL) Predicted AUC/PEGGY     @ 0838 Vanc 750 IV q8h 6.8 356    @ 0632 Vanc 1g IV q8h 9.8 434    1000 mg q8h 13.8      Date & time of next level:  07    Dosing calculator used: [employer] calculator    Significant Positive Cultures:    bcx - group B strep, staph aureus   bcx - group G strep, staph aureus   ucx - e. coli   wound cx - group G strep   bcx - ngtd   wound cx - group G strep   bcx - ngtd    Conditions for Dosing Consideration: Paralysis - paraplegia, bilateral aputaion    Labs:  Recent Labs     22  0636 22  0439 22  0632   CREA 0.25* 0.25* 0.27*   BUN 4* 4* 5*     Recent Labs     22  0636 22  1229 22  0632   WBC 10.2 12.5* 17.6*     Temp (24hrs), Av.4 °F (36.9 °C), Min:97.9 °F (36.6 °C), Max:98.9 °F (37.2 °C)    Creatinine Clearance (mL/min):   actual weight < IBW: CrCl (Actual Body Weight) 98.3    Impression/Plan:   Paraplegic patient  + B/l amputee  Afebrile and WBC improved  Continue Vanc, Metronidazole and Cefepime  Vancomycin level of 13.8 is therapeutic. Continue 1000 mg q8h for today, thn vancomycin ends  Antimicrobial stop date 5 days for vanc; Flagyl/cefepime through      Pharmacy will follow daily and adjust medications as appropriate for renal function and/or serum levels.     Thank you,  Pily Skinner PHARMD

## 2022-01-31 NOTE — PROGRESS NOTES
Bedside shift change report given to [address] (oncoming nurse) by Candelario Mcintosh (offgoing nurse). Report included the following information SBAR, Kardex, [facility] and Cardiac Rhythm NSR.     0824:  Patients IV burning and leaking after starting IV cefepime. Attempted to re-adjust IV more taught to the skin to prevent leaking, redress, and flush, but patient still complaining of unbearable burning. Slowed rate of antibioics and patient still in pain. IV site slightly red and swollen, IV infiltrated. Called PICC team to see if they can come sooner rather than later since this patient needs antibiotics. They said they have one patient leaving today who is going home on long term antibiotics and that patient will be there first case and they'll see if they can do him afterwards. Patient made aware. MD made aware. 2413:  PICC team at bedside, asked how many days patient is going to be on antibiotics. They asked me to reach out to [facility] to see and then they will evaluate what type of line is best to place. I told them he will likely go home with a PICC for long term antibiotic therapy. Reached out to [facility]. He said likely 4 weeks of being on antibiotics. PICC team made aware directly by attending. 5694:  Pt had 13 beat run of Jack Mustafa MD aware. Observe for now. 3381:  When asked if he was ready to do wound care, patient was very verbally aggressive and refusing. Pt stated \"Last time they tried they didn't know what they were doing and hurt me\". Did my best to explain the severity of his wounds/benefit of repacking but patient unwilling to let this RN complete wound care at this time. Patient states \" I just want to talk to the doctor\". Reached out to attending who said he will stop by around rounds at 10 am. Patient notified. Patient states \"Maybe we can do it later but i'm just not feeling it right now. \"    [id number]: Another 5 beat run of el Stanley aware. 1229:  Pt had 28 beats of el STANLEY aware    2589:  Pt sustaining in the 140s-150s, pt denies chest pain or SOB. Patient says he feels perfectly fine. Metoprolol ordered. 1356:  5 beats of vtach    1636:  Patient inquiring about pain medication. Went into room to give pain medication and patient states \"Can you get me whatever else i'm supposed to have now so I can just go to sleep after this. I'm annoyed and I don't wanna be bothered after this. And do I still need to be hooked up to the IV? . I explained to patient the reasoning behind scheduled medication and why he still needed IV and patient continued to shake his head and say \"just give me the shit now\". So, this RN gave percocet and all scheduled 6 pm meds around this time. MD aware and not concerned. Still hooked to IV fluids. Patient also mentioned he needed to straight cath himself soon. I placed a straight cath at his bedside for when he was ready. Patient became very disgruntled and with voice raised said \"I said I was going to need it in a minute, not right now. Aint nobody ever listen to anything I say. \" Did my best to empathize with patient until he raised his voice and said \"Yall better give me someone else tomorrow. \" Yung Forrest made aware that this patient does not want me to be his nurse tomorrow. End of Shift Note    Bedside shift change report given to Esther MARQUEZ (oncoming nurse) by Sammy Israel RN (offgoing nurse). Report included the following information SBAR, Kardex, MAR and Cardiac Rhythm ST    Shift worked:  day     Shift summary and any significant changes:    HR tach into 160s.  Cardio consulted; Stress test scheduled for tomorrow, NPO at midnight; Completed wound care; Gen surg signed off   Concerns for physician to address:  None     Zone phone for oncoming shift:   [phone]       Activity:  Activity Level: Bed Rest  Number times ambulated in hallways past shift: 0  Number of times OOB to chair past shift: 0    Cardiac:   Cardiac Monitoring: Yes      Cardiac Rhythm: Sinus Rhythm    Access:   Current line(s): PICC     Genitourinary:   Urinary status: straight cath    Respiratory:   O2 Device: None (Room air)  Chronic home O2 use?: NO  Incentive spirometer at bedside: NO     GI:  Last Bowel Movement Date: 01/31/22  Current diet:  ADULT DIET Regular  DIET ONE TIME MESSAGE  ADULT ORAL NUTRITION SUPPLEMENT Breakfast, Lunch, Dinner; Standard High Calorie/High Protein  ADULT ORAL NUTRITION SUPPLEMENT Lunch, Dinner; Frozen Supplement  DIET NPO Sips of Water with Meds  Passing flatus: YES  Tolerating current diet: YES       Pain Management:   Patient states pain is manageable on current regimen: YES    Skin:  Dimitri Score: 13  Interventions: float heels and increase time out of bed    Patient Safety:  Fall Score:  Total Score: 2  Interventions: gripper socks and pt to call before getting OOB  High Fall Risk: Yes    Length of Stay:  Expected LOS: - - -  Actual LOS: 5      Jairo Rich RN
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
## 2022-02-01 ENCOUNTER — TELEPHONE (OUTPATIENT)
Dept: INFECTIOUS DISEASES | Age: 27
End: 2022-02-01

## 2022-02-01 ENCOUNTER — APPOINTMENT (OUTPATIENT)
Dept: GENERAL RADIOLOGY | Age: 27
DRG: 720 | End: 2022-02-01
Attending: INTERNAL MEDICINE
Payer: MEDICAID

## 2022-02-01 ENCOUNTER — HOSPITAL ENCOUNTER (OUTPATIENT)
Dept: NON INVASIVE DIAGNOSTICS | Age: 27
Discharge: HOME | DRG: 720 | End: 2022-02-01
Attending: NURSE PRACTITIONER
Payer: MEDICAID

## 2022-02-01 ENCOUNTER — APPOINTMENT (OUTPATIENT)
Dept: NUCLEAR MEDICINE | Age: 27
DRG: 720 | End: 2022-02-01
Attending: NURSE PRACTITIONER
Payer: MEDICAID

## 2022-02-01 LAB
ANION GAP SERPL CALC-SCNC: 3 MMOL/L (ref 5–15)
BUN SERPL-MCNC: 4 MG/DL (ref 6–20)
BUN/CREAT SERPL: 24 (ref 12–20)
CALCIUM SERPL-MCNC: 7.7 MG/DL (ref 8.5–10.1)
CHLORIDE SERPL-SCNC: 107 MMOL/L (ref 97–108)
CO2 SERPL-SCNC: 28 MMOL/L (ref 21–32)
CREAT SERPL-MCNC: 0.17 MG/DL (ref 0.7–1.3)
ERYTHROCYTE [DISTWIDTH] IN BLOOD BY AUTOMATED COUNT: 18.3 % (ref 11.5–14.5)
ERYTHROCYTE [SEDIMENTATION RATE] IN BLOOD: 85 MM/HR (ref 0–15)
GLUCOSE SERPL-MCNC: 88 MG/DL (ref 65–100)
HCT VFR BLD AUTO: 23.4 % (ref 36.6–50.3)
HCT VFR BLD AUTO: 23.4 % (ref 36.6–50.3)
HGB BLD-MCNC: 6.8 G/DL (ref 12.1–17)
HGB BLD-MCNC: 6.9 G/DL (ref 12.1–17)
MAGNESIUM SERPL-MCNC: 1.9 MG/DL (ref 1.6–2.4)
MCH RBC QN AUTO: 24.7 PG (ref 26–34)
MCHC RBC AUTO-ENTMCNC: 29.5 G/DL (ref 30–36.5)
MCV RBC AUTO: 83.9 FL (ref 80–99)
NRBC # BLD: 0 K/UL (ref 0–0.01)
NRBC BLD-RTO: 0 PER 100 WBC
PLATELET # BLD AUTO: 541 K/UL (ref 150–400)
PMV BLD AUTO: 8.5 FL (ref 8.9–12.9)
POTASSIUM SERPL-SCNC: 4.2 MMOL/L (ref 3.5–5.1)
RBC # BLD AUTO: 2.79 M/UL (ref 4.1–5.7)
SODIUM SERPL-SCNC: 138 MMOL/L (ref 136–145)
WBC # BLD AUTO: 11.4 K/UL (ref 4.1–11.1)

## 2022-02-01 PROCEDURE — 85652 RBC SED RATE AUTOMATED: CPT

## 2022-02-01 PROCEDURE — APPSS45 APP SPLIT SHARED TIME 31-45 MINUTES: Performed by: NURSE PRACTITIONER

## 2022-02-01 PROCEDURE — 74011250636 HC RX REV CODE- 250/636: Performed by: INTERNAL MEDICINE

## 2022-02-01 PROCEDURE — 93017 CV STRESS TEST TRACING ONLY: CPT

## 2022-02-01 PROCEDURE — 83735 ASSAY OF MAGNESIUM: CPT

## 2022-02-01 PROCEDURE — 83605 ASSAY OF LACTIC ACID: CPT

## 2022-02-01 PROCEDURE — 36415 COLL VENOUS BLD VENIPUNCTURE: CPT

## 2022-02-01 PROCEDURE — 85027 COMPLETE CBC AUTOMATED: CPT

## 2022-02-01 PROCEDURE — 78451 HT MUSCLE IMAGE SPECT SING: CPT

## 2022-02-01 PROCEDURE — 74011250637 HC RX REV CODE- 250/637: Performed by: NURSE PRACTITIONER

## 2022-02-01 PROCEDURE — 74011250637 HC RX REV CODE- 250/637: Performed by: STUDENT IN AN ORGANIZED HEALTH CARE EDUCATION/TRAINING PROGRAM

## 2022-02-01 PROCEDURE — 72100 X-RAY EXAM L-S SPINE 2/3 VWS: CPT

## 2022-02-01 PROCEDURE — 65660000001 HC RM ICU INTERMED STEPDOWN

## 2022-02-01 PROCEDURE — 74011000250 HC RX REV CODE- 250: Performed by: STUDENT IN AN ORGANIZED HEALTH CARE EDUCATION/TRAINING PROGRAM

## 2022-02-01 PROCEDURE — 80048 BASIC METABOLIC PNL TOTAL CA: CPT

## 2022-02-01 PROCEDURE — 2709999900 HC NON-CHARGEABLE SUPPLY

## 2022-02-01 PROCEDURE — 99223 1ST HOSP IP/OBS HIGH 75: CPT | Performed by: INTERNAL MEDICINE

## 2022-02-01 PROCEDURE — 85018 HEMOGLOBIN: CPT

## 2022-02-01 PROCEDURE — 74011250637 HC RX REV CODE- 250/637: Performed by: INTERNAL MEDICINE

## 2022-02-01 PROCEDURE — 99232 SBSQ HOSP IP/OBS MODERATE 35: CPT | Performed by: INTERNAL MEDICINE

## 2022-02-01 PROCEDURE — 74011000250 HC RX REV CODE- 250: Performed by: INTERNAL MEDICINE

## 2022-02-01 RX ORDER — METRONIDAZOLE 250 MG/1
500 TABLET ORAL EVERY 12 HOURS
Status: DISCONTINUED | OUTPATIENT
Start: 2022-02-01 | End: 2022-02-02 | Stop reason: HOSPADM

## 2022-02-01 RX ORDER — ACETAMINOPHEN 325 MG/1
650 TABLET ORAL
Status: DISCONTINUED | OUTPATIENT
Start: 2022-02-01 | End: 2022-02-02 | Stop reason: HOSPADM

## 2022-02-01 RX ADMIN — OXYCODONE AND ACETAMINOPHEN 1 TABLET: 10; 325 TABLET ORAL at 18:49

## 2022-02-01 RX ADMIN — NYSTATIN 5 ML: 100000 SUSPENSION ORAL at 23:00

## 2022-02-01 RX ADMIN — FOLIC ACID 1 MG: 1 TABLET ORAL at 10:40

## 2022-02-01 RX ADMIN — WATER 2 G: 1 INJECTION INTRAMUSCULAR; INTRAVENOUS; SUBCUTANEOUS at 06:22

## 2022-02-01 RX ADMIN — METOPROLOL TARTRATE 12.5 MG: 25 TABLET, FILM COATED ORAL at 17:56

## 2022-02-01 RX ADMIN — Medication 1 CAPSULE: at 10:38

## 2022-02-01 RX ADMIN — METOPROLOL TARTRATE 12.5 MG: 25 TABLET, FILM COATED ORAL at 10:38

## 2022-02-01 RX ADMIN — HYDROMORPHONE HYDROCHLORIDE 0.5 MG: 1 INJECTION, SOLUTION INTRAMUSCULAR; INTRAVENOUS; SUBCUTANEOUS at 17:52

## 2022-02-01 RX ADMIN — SODIUM CHLORIDE, PRESERVATIVE FREE 10 ML: 5 INJECTION INTRAVENOUS at 17:59

## 2022-02-01 RX ADMIN — OXYCODONE AND ACETAMINOPHEN 1 TABLET: 10; 325 TABLET ORAL at 01:02

## 2022-02-01 RX ADMIN — OXYCODONE AND ACETAMINOPHEN 1 TABLET: 10; 325 TABLET ORAL at 14:26

## 2022-02-01 RX ADMIN — WATER 2 G: 1 INJECTION INTRAMUSCULAR; INTRAVENOUS; SUBCUTANEOUS at 19:50

## 2022-02-01 RX ADMIN — SODIUM CHLORIDE, PRESERVATIVE FREE 10 ML: 5 INJECTION INTRAVENOUS at 22:00

## 2022-02-01 RX ADMIN — ACETAMINOPHEN 650 MG: 325 TABLET ORAL at 19:49

## 2022-02-01 RX ADMIN — DAKIN'S SOLUTION 0.125% (QUARTER STRENGTH): 0.12 SOLUTION at 10:41

## 2022-02-01 RX ADMIN — SODIUM CHLORIDE, PRESERVATIVE FREE 10 ML: 5 INJECTION INTRAVENOUS at 06:39

## 2022-02-01 RX ADMIN — NYSTATIN 5 ML: 100000 SUSPENSION ORAL at 14:41

## 2022-02-01 RX ADMIN — OXYCODONE AND ACETAMINOPHEN 1 TABLET: 10; 325 TABLET ORAL at 06:59

## 2022-02-01 RX ADMIN — REGADENOSON 0.4 MG: 0.08 INJECTION, SOLUTION INTRAVENOUS at 09:21

## 2022-02-01 RX ADMIN — WATER 2 G: 1 INJECTION INTRAMUSCULAR; INTRAVENOUS; SUBCUTANEOUS at 14:30

## 2022-02-01 RX ADMIN — METRONIDAZOLE 500 MG: 250 TABLET ORAL at 10:38

## 2022-02-01 RX ADMIN — OXYCODONE AND ACETAMINOPHEN 1 TABLET: 10; 325 TABLET ORAL at 22:56

## 2022-02-01 RX ADMIN — NYSTATIN 5 ML: 100000 SUSPENSION ORAL at 06:39

## 2022-02-01 RX ADMIN — SODIUM CHLORIDE 125 ML/HR: 9 INJECTION, SOLUTION INTRAVENOUS at 04:19

## 2022-02-01 RX ADMIN — METRONIDAZOLE 500 MG: 250 TABLET ORAL at 21:15

## 2022-02-01 RX ADMIN — WATER 2 G: 1 INJECTION INTRAMUSCULAR; INTRAVENOUS; SUBCUTANEOUS at 01:06

## 2022-02-01 NOTE — PROGRESS NOTES
MRI Pending:    [facility]. Sign and fax to [phone]. Call [phone] once faxed.

## 2022-02-01 NOTE — PROGRESS NOTES
Hospitalist Progress Note    NAME: Anitha Beltre   :  1995   MRN:  [id number]       Assessment / Plan:  Streptococcal & Staphylococcal sp Bacteremia POA-  EColi UTI POA  Causing severe Sepsis POA- improving, WBC down to 11k now,  Afebrile now in past >72 hrs  Stage IV sacral decubitus ulcers POA  Paraplegia-wheelchair-bound  Oropharyngeal Candidiasis ()  CT abdomen/pelvis=  1. Extensive sacral decubitus soft tissue wounds with a large collection of  fluid and air in the right posterior soft tissues. There is sclerosis and  remodeling of the posterior ischii and sacrum. Osteomyelitis is not excluded.     2. Urinary bladder air can be seen with catheterization or infection    Follow Wound cultures, Blood culture & Urine cx  Repeat blood cx ()= neg in 4 days  s/p empiric IV ABx- vancomycin, cefepime and Flagyl- s/p PICC line now for long term Abx , ID consulted for DC Abx selection and duration- changed to IV Ancef + flagyl  Recommends MRI to r/o Osteomyelitis ---- if pt refuses will need to commit for 4-6 weeks of IV Abx  Cont Probiotic  IP Wound care consulted- following  IP General surgery consulted- following, signed off now, will need wound care at set up as outpatient [facility]  -Case management consulted for the possible needs at home. DC IV fluids and pain management. Cont Nystatin S/S    Paroxsymal VTachs ()  Started on low dose Metoprolol  Ip cardiology consulted--- following, stress test attempted today-- pt being non cooperative despite multiple explanations regarding need for it    Normocytic anemia  -Hemoglobin 7.8- >7.1->6.6->7.1->6.9 - >7.5->7.8->6.9 today  Ordered iron profile with ferritin, B12 and folate. S/p Transfused 1 PRBC ()  Follow CBC in AM  No transfusion now       Code Status: Full code  Surrogate Decision Maker: girl friend? ?     DVT Prophylaxis: Lovenox  GI Prophylaxis: not indicated     Baseline: Wheelchair-bound at home    Estimated discharge date:   Barriers: home DMEs, surgical clearance? , PICC line for IV Abx at home    Recommended Disposition: Home w/Family and HH PT, OT, RN likely for wound care & IV Abx likely     Subjective:     Chief Complaint / Reason for Physician Visit : F/U sepsis, bacteremia, Decubitus ulcers, UTI, Paraplegia, oral  Thursh  \"I am ok\". Discussed with RN events overnight. Review of Systems:  Symptom Y/N Comments  Symptom Y/N Comments   Fever/Chills n   Chest Pain n    Poor Appetite n   Edema n    Cough n   Abdominal Pain n    Sputum n   Joint Pain n    SOB/BILLINGS n   Pruritis/Rash     Nausea/vomit    Tolerating PT/OT n    Diarrhea    Tolerating Diet y    Constipation    Other       Could NOT obtain due to:      Objective:     VITALS:   Last 24hrs VS reviewed since prior progress note. Most recent are:  Patient Vitals for the past 24 hrs:   Temp Pulse Resp BP SpO2   02/01/22 1130 98.6 °F (37 °C) 98 17 114/68 99 %   02/01/22 0737 98.3 °F (36.8 °C) (!) 107 16 124/81 98 %   02/01/22 0250 98.4 °F (36.9 °C) 95 16 114/73 --   01/31/22 2244 98.7 °F (37.1 °C) 95 14 105/73 --   01/31/22 1913 98.5 °F (36.9 °C) (!) 105 18 (!) 121/91 --   01/31/22 1509 99 °F (37.2 °C) (!) 101 17 123/71 100 %   01/31/22 1300 -- (!) 106 -- -- --   01/31/22 1252 -- (!) 142 -- -- --       Intake/Output Summary (Last 24 hours) at 2/1/2022 1215  Last data filed at 2/1/2022 1131  Gross per 24 hour   Intake 480 ml   Output 4700 ml   Net -4220 ml        I had a face to face encounter and independently examined this patient on 2/1/2022, as outlined below:  PHYSICAL EXAM:  General: WD, WN. Alert, cooperative, no acute distress    EENT:  EOMI. Anicteric sclerae. MMM  Resp:  CTA bilaterally, no wheezing or rales. No accessory muscle use  CV:  Regular  rhythm,  No edema  GI:  Soft, Non distended, Non tender. +Bowel sounds  Neurologic:  Alert and oriented X 3, normal speech,   Psych:   Good insight.  Not anxious nor agitated  Skin:  R Buttock pressure Ulcer noted +    Reviewed most current lab test results and cultures  YES  Reviewed most current radiology test results   YES  Review and summation of old records today    NO  Reviewed patient's current orders and MAR    YES  PMH/SH reviewed - no change compared to H&P  ________________________________________________________________________  Care Plan discussed with:    Comments   Patient x    Family      RN x    Care Manager x    Consultant  x ID Dr Antonette Escobar, Stress                  x Multidiciplinary team rounds were held today with , nursing, pharmacist and clinical coordinator. Patient's plan of care was discussed; medications were reviewed and discharge planning was addressed. ________________________________________________________________________  Total NON critical care TIME:  36   Minutes    Total CRITICAL CARE TIME Spent:   Minutes non procedure based      Comments   >50% of visit spent in counseling and coordination of care x    ________________________________________________________________________  Michael Aquino MD     Procedures: see electronic medical records for all procedures/Xrays and details which were not copied into this note but were reviewed prior to creation of Plan. LABS:  I reviewed today's most current labs and imaging studies. Pertinent labs include:  Recent Labs     02/01/22  0642 02/01/22  0103 01/31/22  0636 01/30/22  1229 01/30/22  1229   WBC  --  11.4* 10.2  --  12.5*   HGB 6.8* 6.9* 7.8*   < > 7.5*   HCT 23.4* 23.4* 27.7*   < > 25.8*   PLT  --  541* 705*  --  690*    < > = values in this interval not displayed.      Recent Labs     02/01/22  0103 01/31/22  0636 01/30/22  0439    137 139   K 4.2 3.8 3.6    107 106   CO2 28 26 28   GLU 88 80 83   BUN 4* 4* 4*   CREA 0.17* 0.25* 0.25*   CA 7.7* 8.0* 7.7*   MG 1.9  --   --        Signed: Michael Aquino MD

## 2022-02-01 NOTE — PROGRESS NOTES
0700: End of Shift Note    Bedside shift change report given to Getachew Flores RN (oncoming nurse) by Jono Mariee (offgoing nurse). Report included the following information SBAR, Kardex, Intake/Output, MAR, Recent Results and Cardiac Rhythm NSR    Shift worked:  Night     Shift summary and any significant changes:     Wound care done this AM. Patient medicated multiple times for pain throughout shift. Concerns for physician to address:       Zone phone for oncoming shift:          Activity:  Activity Level: Bed Rest  Number times ambulated in hallways past shift: 0  Number of times OOB to chair past shift: 0    Cardiac:   Cardiac Monitoring: Yes      Cardiac Rhythm: Sinus Rhythm    Access:   Current line(s): PIV     Genitourinary:   Urinary status: self cath    Respiratory:   O2 Device: None (Room air)  Chronic home O2 use?: NO  Incentive spirometer at bedside: NO     GI:  Last Bowel Movement Date: 01/31/22  Current diet:  DIET ONE TIME MESSAGE  ADULT ORAL NUTRITION SUPPLEMENT Breakfast, Lunch, Dinner; Standard High Calorie/High Protein  ADULT ORAL NUTRITION SUPPLEMENT Lunch, Dinner; Frozen Supplement  DIET NPO Sips of Water with Meds  Passing flatus: YES  Tolerating current diet: YES       Pain Management:   Patient states pain is manageable on current regimen: YES    Skin:  Dimitri Score: 13  Interventions: increase time out of bed and PT/OT consult    Patient Safety:  Fall Score:  Total Score: 2  Interventions: bed/chair alarm, gripper socks and pt to call before getting OOB  High Fall Risk: Yes    Length of Stay:  Expected LOS: - - -  Actual LOS: 6      Esther Carmona
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
## 2022-02-01 NOTE — PROGRESS NOTES
Nuc Med: Lexiscan stress test completed. Stress images were acquired, but due to the increasesd Liver uptake the inferior wall of the heart is hard to visualize. Asked pt to repeat 7 min images, but patient refused. Discussed importance of repeating images and pt still refused. Relayed this info to Dr. Tejinder Pearl NP and his nurse Anthony Lindsey. Will try to get resting images in the am if the patient is still here. He states he just wants to go home.

## 2022-02-01 NOTE — PROGRESS NOTES
Received notification from bedside RN about patient with regards to: H/H 6.9/23.4  VS: /73, HR 95, RR 14, O2 sat 100% on RA    Intervention given: redraw H/H at 0600 ordered

## 2022-02-01 NOTE — WOUND CARE
Wound Care Nurse= Re-assessment  Of wounds. Patient has x3 Stage 4 PI to bilateral ischium's and sacrum. Dr Umer Anderson at bedside to assess wounds today. Patient refusing MRI and Stress Test today. He is refusing to allow Envella air fluidized bed be turned on so he is sitting on a hardened mattress. Patient refusing PICC line placement and states he is going home. Dr Umer Anderson and [facility] nurse asked him to stay at least one more day and get IV abxs and wound care straight but patient refused. He gives no real explanation besides he does not like it here and wants to go home. He has called his girlfriend to come pick him up. He did allow  nurse to change dressings and  nurse asked that he at least contact [facility] and be seen at their [facility] for wound treatment. Patient would not commit to [facility] nurse this suggestion. Right ischial still has a lot of drainage, now more sero-sang then thin purulent drainage:      Q-tips indicate x2 6cm tunnels of the right ischial wound. Tissue looks more granulated and slough is gone from the mid portion of wound. Both tunnels packed and rest of wound packed with 1/4 strength Dakins moist Kerlix and covered with dry 4x4's and ABD pad. Sacral and left ischial wounds also packed 1/4 Dakins moist packing and covered with foam dressings. New clean chux placed under patient and packed up extra dressings in room for him to take home. I spoke to Unit director about patient wanting to leave [place] and she went into room to speak with him as I left.     Naomi Purdy RN, Nithin Ng

## 2022-02-01 NOTE — PROGRESS NOTES
[address] [place] [address]  [phone]      Cardiology Progress Note      2/1/2022 150 PM    Admit Date: 1/26/2022    Admit Diagnosis:   Sepsis ([address] [A41.9]  UTI (urinary tract infection) [N39.0]  Decubitus ulcer of ankle, stage 4 (HCC) [L89.504]    Interval History/Subjective:     Jozef Kerr is a 23 y.o. male with PMH paraplegia who was  admitted for Sepsis ([address] [A41.9]  UTI (urinary tract infection) [N39.0]  Decubitus ulcer of ankle, stage 4 ([address] [L89.504]. -occ tachy  -Hg down to 6.8  -Mr. July Lund states he's going home and wants to leave AMA. His pain is currently okay. He has refused further testing today.      Visit Vitals  /68 (BP 1 Location: Left upper arm, BP Patient Position: At rest)   Pulse 98   Temp 98.6 °F (37 °C)   Resp 17   Ht 6' (1.829 m)   Wt 43.1 kg (95 lb 0.3 oz)   SpO2 99%   BMI 12.89 kg/m²       Current Facility-Administered Medications   Medication Dose Route Frequency    metroNIDAZOLE (FLAGYL) tablet 500 mg  500 mg Oral Q12H    ceFAZolin (ANCEF) 2 g in sterile water (preservative free) 20 mL IV syringe  2 g IntraVENous Q6H    bacitracin 500 unit/gram packet 1 Packet  1 Packet Topical PRN    heparin (porcine) pf 300 Units  300 Units InterCATHeter PRN    metoprolol tartrate (LOPRESSOR) tablet 12.5 mg  12.5 mg Oral BID    klack's mouthwash oral suspension (COMPOUNDED)  5 mL Oral Q6H    benzocaine-menthoL (CHLORASEPTIC MAX) lozenge 1 Lozenge  1 Lozenge Oral N4P PRN    folic acid (FOLVITE) tablet 1 mg  1 mg Oral DAILY    enoxaparin (LOVENOX) injection 30 mg  30 mg SubCUTAneous DAILY    morphine injection 2 mg  2 mg IntraVENous Q3H PRN    oxyCODONE-acetaminophen (PERCOCET 10)  mg per tablet 1 Tablet  1 Tablet Oral Q4H PRN    HYDROmorphone (DILAUDID) injection 0.5 mg  0.5 mg IntraVENous Q2H PRN    0.9% sodium chloride infusion 250 mL  250 mL IntraVENous PRN    sodium hypochlorite (QUARTER STRENGTH DAKIN'S) 0.125% irrigation (bottle) Topical DAILY    sodium chloride (NS) flush 5-40 mL  5-40 mL IntraVENous Q8H    sodium chloride (NS) flush 5-40 mL  5-40 mL IntraVENous PRN    polyethylene glycol (MIRALAX) packet 17 g  17 g Oral DAILY PRN    ondansetron (ZOFRAN ODT) tablet 4 mg  4 mg Oral Q8H PRN    Or    ondansetron (ZOFRAN) injection 4 mg  4 mg IntraVENous Q6H PRN    L.acidophilus-paracasei-S.thermophil-bifidobacter (RISAQUAD) 8 billion cell capsule  1 Capsule Oral DAILY       Objective:      Physical Exam:  General Appearance:  adult AAM resting in bed in NAD  Chest:   speaking full sentences   Cardiovascular:    Abdomen:     Extremities: LE wasting. Skin:  wound not visualized      ** full assessment not completed due to patient actively trying to leave. Dr. Claudell Second saw patient earlier in day. **    Data Review:   Recent Labs     02/01/22  0642 02/01/22  0103 01/31/22  0636 01/30/22  1229 01/30/22  1229   WBC  --  11.4* 10.2  --  12.5*   HGB 6.8* 6.9* 7.8*   < > 7.5*   HCT 23.4* 23.4* 27.7*   < > 25.8*   PLT  --  541* 705*  --  690*    < > = values in this interval not displayed. Recent Labs     02/01/22  0103 01/31/22  0636 01/30/22  0439    137 139   K 4.2 3.8 3.6    107 106   CO2 28 26 28   GLU 88 80 83   BUN 4* 4* 4*   CREA 0.17* 0.25* 0.25*   CA 7.7* 8.0* 7.7*   MG 1.9  --   --        No results for input(s): TROIQ, CPK, CKMB in the last 72 hours. Intake/Output Summary (Last 24 hours) at 2/1/2022 1457  Last data filed at 2/1/2022 1131  Gross per 24 hour   Intake 480 ml   Output 4700 ml   Net -4220 ml        Telemetry: SR to ST with NSVT   ECG: SVT vs a flutter   Echocardiogram:  EF 60-65%  CXRAY: no acute process     Assessment:     Active Problems:    UTI (urinary tract infection) (10/13/2017)      Sepsis ([facility] (1/26/2022)      Decubitus ulcer of ankle, stage 4 ([facility] (1/26/2022)      NS[facility] (nonsustained ventricular tachycardia) [facility] (1/31/2022)        Plan:     NSVT:  Continued bursts.   In setting of sepsis from decubitus ulcer. EF 60-65%. No further atrial tach/SVT bursts   · Continue BB  · Part of stress test completed today, but then patient refused to finish it. Can attempt additional images tomorrow if patient doesn't leave AMA and he agrees to more images     Sepsis/decubitus ulcer:  Improving  · Per hospitalist/ID    Мария Mendez, DONELL  DNP, RN, [facility]      Patient seen and examined by me with nurse practitioner. I personally performed all components of the history, physical, and medical decision making and agree with the assessment and plan as noted. leximyoview if patient consents to procedure. Continue BB. Normal LVEF.      Maday Osorio MD

## 2022-02-01 NOTE — PROGRESS NOTES
1215  Patient refused to complete the second part of stress test and MRI. Patient stated he \"wants to go home\".

## 2022-02-01 NOTE — PROGRESS NOTES
Comprehensive Nutrition Assessment    Type and Reason for Visit: Reassess    Nutrition Recommendations/Plan:   Continue regular diet  Ensure enlive TID (chocolate)  Please document % meals and supplements consumed in flowsheet I/O's under intake     Nutrition Assessment:      Chart reviewed and case discussed during IDR. Pt was NPO this morning for stress test. He had just returned and was very hungry during my visit today. He reports his appetite has been great and he is drinking all the Ensure enlive shakes, he did not like the Magic cups. Continue to encourage intake of meals and supplements d/t high needs for wound healing. Patient Vitals for the past 168 hrs:   % Diet Eaten   01/31/22 1509 26 - 50%   01/30/22 0803 26 - 50%   01/28/22 0733 51 - 75%     Wt Readings from Last 5 Encounters:   01/27/22 43.1 kg (95 lb 0.3 oz)   01/08/22 42.2 kg (93 lb)   02/22/20 44.6 kg (98 lb 5 oz)   10/27/19 48.5 kg (107 lb)   02/13/19 46.7 kg (103 lb)   [    Malnutrition Assessment:  Malnutrition Status: Moderate malnutrition    Context:  Acute illness     Findings of the 6 clinical characteristics of malnutrition:   Energy Intake:  7 - 50% or less of est energy requirements for 5 or more days  Weight Loss:  Unable to assess     Body Fat Loss:  1 - Mild body fat loss, Buccal region   Muscle Mass Loss:  1 - Mild muscle mass loss, Temples (temporalis)  Fluid Accumulation:  No significant fluid accumulation,     Strength:  Not performed         Estimated Daily Nutrient Needs:  Energy (kcal): 8472-1994 kcals (30-35 kcals/kg); Weight Used for Energy Requirements: Current  Protein (g): 86g (2.0g/kg); Weight Used for Protein Requirements: Current  Fluid (ml/day): 1400mL; Method Used for Fluid Requirements: 1 ml/kcal      Nutrition Related Findings:  Labs: Hgb 6.8. Meds: ancef, folvite, klacks mouthwash, risaquad, flagyl, NS, BM 1/31.       Wounds:    Multiple,Stage IV       Current Nutrition Therapies:  ADULT ORAL NUTRITION SUPPLEMENT Breakfast, Lunch, Dinner; Standard High Calorie/High Protein  ADULT DIET Regular    Anthropometric Measures:  · Height:  6' (182.9 cm)  · Current Body Wt:  43.1 kg (95 lb)   · Ideal Body Wt:  178 lbs:  53.4 %   · Adjusted Body Weight:  106.2; Weight Adjustment for: Amputation   · Adjusted BMI:  14.4    · BMI Category:  Underweight (BMI less than 18.5)       Nutrition Diagnosis:   · Increased nutrient needs related to  (wound healing) as evidenced by  (large stage 4 wounds)  Dx continues.     Nutrition Interventions:   Food and/or Nutrient Delivery: Continue current diet,Continue oral nutrition supplement  Nutrition Education and Counseling: No recommendations at this time  Coordination of Nutrition Care: Continue to monitor while inpatient,Interdisciplinary rounds    Goals:  PO intake >75% meals and supplements next 5-7 days       Nutrition Monitoring and Evaluation:   Behavioral-Environmental Outcomes: None identified  Food/Nutrient Intake Outcomes: Food and nutrient intake,Supplement intake  Physical Signs/Symptoms Outcomes: Biochemical data,GI status,Weight,Skin,Nutrition focused physical findings    Discharge Planning:    Continue oral nutrition supplement,Continue current diet     Electronically signed by Abraham Rowland RD on 2/1/2022 at 11:46 AM    Contact: [phone]

## 2022-02-02 VITALS
BODY MASS INDEX: 12.87 KG/M2 | TEMPERATURE: 98.2 F | DIASTOLIC BLOOD PRESSURE: 68 MMHG | HEIGHT: 72 IN | WEIGHT: 95.02 LBS | SYSTOLIC BLOOD PRESSURE: 111 MMHG | RESPIRATION RATE: 18 BRPM | OXYGEN SATURATION: 98 % | HEART RATE: 99 BPM

## 2022-02-02 LAB
ANION GAP SERPL CALC-SCNC: 2 MMOL/L (ref 5–15)
BUN SERPL-MCNC: 6 MG/DL (ref 6–20)
BUN/CREAT SERPL: 24 (ref 12–20)
CALCIUM SERPL-MCNC: 7.9 MG/DL (ref 8.5–10.1)
CHLORIDE SERPL-SCNC: 104 MMOL/L (ref 97–108)
CO2 SERPL-SCNC: 30 MMOL/L (ref 21–32)
CREAT SERPL-MCNC: 0.25 MG/DL (ref 0.7–1.3)
ERYTHROCYTE [DISTWIDTH] IN BLOOD BY AUTOMATED COUNT: 19 % (ref 11.5–14.5)
GLUCOSE SERPL-MCNC: 99 MG/DL (ref 65–100)
HCT VFR BLD AUTO: 23.7 % (ref 36.6–50.3)
HGB BLD-MCNC: 6.9 G/DL (ref 12.1–17)
LACTATE SERPL-SCNC: 0.8 MMOL/L (ref 0.4–2)
MCH RBC QN AUTO: 24.5 PG (ref 26–34)
MCHC RBC AUTO-ENTMCNC: 29.1 G/DL (ref 30–36.5)
MCV RBC AUTO: 84 FL (ref 80–99)
NRBC # BLD: 0 K/UL (ref 0–0.01)
NRBC BLD-RTO: 0 PER 100 WBC
PLATELET # BLD AUTO: 542 K/UL (ref 150–400)
PMV BLD AUTO: 8.2 FL (ref 8.9–12.9)
POTASSIUM SERPL-SCNC: 4.3 MMOL/L (ref 3.5–5.1)
RBC # BLD AUTO: 2.82 M/UL (ref 4.1–5.7)
SODIUM SERPL-SCNC: 136 MMOL/L (ref 136–145)
STRESS BASELINE DIAS BP: 66 MMHG
STRESS BASELINE HR: 79 BPM
STRESS BASELINE ST DEPRESSION: 0 MM
STRESS BASELINE SYS BP: 120 MMHG
STRESS ESTIMATED WORKLOAD: 1 METS
STRESS EXERCISE DUR MIN: NORMAL
STRESS O2 SAT PEAK: 100 %
STRESS O2 SAT REST: 100 %
STRESS PEAK DIAS BP: 69 MMHG
STRESS PEAK SYS BP: 128 MMHG
STRESS PERCENT HR ACHIEVED: 69 %
STRESS POST PEAK HR: 134 BPM
STRESS RATE PRESSURE PRODUCT: NORMAL BPM*MMHG
STRESS ST DEPRESSION: 0 MM
STRESS TARGET HR: 195 BPM
WBC # BLD AUTO: 11.6 K/UL (ref 4.1–11.1)

## 2022-02-02 PROCEDURE — 99233 SBSQ HOSP IP/OBS HIGH 50: CPT | Performed by: INTERNAL MEDICINE

## 2022-02-02 PROCEDURE — 74011250636 HC RX REV CODE- 250/636: Performed by: INTERNAL MEDICINE

## 2022-02-02 PROCEDURE — 74011250637 HC RX REV CODE- 250/637: Performed by: INTERNAL MEDICINE

## 2022-02-02 PROCEDURE — 74011250637 HC RX REV CODE- 250/637: Performed by: STUDENT IN AN ORGANIZED HEALTH CARE EDUCATION/TRAINING PROGRAM

## 2022-02-02 PROCEDURE — APPSS45 APP SPLIT SHARED TIME 31-45 MINUTES: Performed by: NURSE PRACTITIONER

## 2022-02-02 PROCEDURE — 80048 BASIC METABOLIC PNL TOTAL CA: CPT

## 2022-02-02 PROCEDURE — 99232 SBSQ HOSP IP/OBS MODERATE 35: CPT | Performed by: INTERNAL MEDICINE

## 2022-02-02 PROCEDURE — 74011000250 HC RX REV CODE- 250: Performed by: STUDENT IN AN ORGANIZED HEALTH CARE EDUCATION/TRAINING PROGRAM

## 2022-02-02 PROCEDURE — 74011000250 HC RX REV CODE- 250: Performed by: INTERNAL MEDICINE

## 2022-02-02 PROCEDURE — 85027 COMPLETE CBC AUTOMATED: CPT

## 2022-02-02 RX ORDER — METOPROLOL TARTRATE 25 MG/1
12.5 TABLET, FILM COATED ORAL 2 TIMES DAILY
Qty: 30 TABLET | Refills: 0 | Status: SHIPPED | OUTPATIENT
Start: 2022-02-02 | End: 2022-03-04

## 2022-02-02 RX ORDER — TETRAKIS(2-METHOXYISOBUTYLISOCYANIDE)COPPER(I) TETRAFLUOROBORATE 1 MG/ML
30 INJECTION, POWDER, LYOPHILIZED, FOR SOLUTION INTRAVENOUS
Status: DISCONTINUED | OUTPATIENT
Start: 2022-02-02 | End: 2022-02-02 | Stop reason: SDUPTHER

## 2022-02-02 RX ORDER — METRONIDAZOLE 500 MG/1
500 TABLET ORAL 2 TIMES DAILY
Qty: 18 TABLET | Refills: 0 | Status: SHIPPED | OUTPATIENT
Start: 2022-02-02 | End: 2022-02-11

## 2022-02-02 RX ORDER — TETRAKIS(2-METHOXYISOBUTYLISOCYANIDE)COPPER(I) TETRAFLUOROBORATE 1 MG/ML
30 INJECTION, POWDER, LYOPHILIZED, FOR SOLUTION INTRAVENOUS
Status: COMPLETED | OUTPATIENT
Start: 2022-02-02 | End: 2022-02-02

## 2022-02-02 RX ORDER — NYSTATIN 100000 [USP'U]/ML
1 SUSPENSION ORAL 4 TIMES DAILY
Qty: 140 ML | Refills: 0 | Status: SHIPPED | OUTPATIENT
Start: 2022-02-02 | End: 2022-02-09

## 2022-02-02 RX ORDER — OXYCODONE AND ACETAMINOPHEN 10; 325 MG/1; MG/1
1 TABLET ORAL
Qty: 20 TABLET | Refills: 0 | Status: SHIPPED | OUTPATIENT
Start: 2022-02-02 | End: 2022-02-05

## 2022-02-02 RX ADMIN — WATER 2 G: 1 INJECTION INTRAMUSCULAR; INTRAVENOUS; SUBCUTANEOUS at 15:05

## 2022-02-02 RX ADMIN — DAKIN'S SOLUTION 0.125% (QUARTER STRENGTH): 0.12 SOLUTION at 09:28

## 2022-02-02 RX ADMIN — Medication 1 CAPSULE: at 09:20

## 2022-02-02 RX ADMIN — WATER 2 G: 1 INJECTION INTRAMUSCULAR; INTRAVENOUS; SUBCUTANEOUS at 02:40

## 2022-02-02 RX ADMIN — OXYCODONE AND ACETAMINOPHEN 1 TABLET: 10; 325 TABLET ORAL at 13:36

## 2022-02-02 RX ADMIN — SODIUM CHLORIDE, PRESERVATIVE FREE 10 ML: 5 INJECTION INTRAVENOUS at 06:19

## 2022-02-02 RX ADMIN — FOLIC ACID 1 MG: 1 TABLET ORAL at 09:20

## 2022-02-02 RX ADMIN — OXYCODONE AND ACETAMINOPHEN 1 TABLET: 10; 325 TABLET ORAL at 09:20

## 2022-02-02 RX ADMIN — SODIUM CHLORIDE, PRESERVATIVE FREE 10 ML: 5 INJECTION INTRAVENOUS at 15:06

## 2022-02-02 RX ADMIN — OXYCODONE AND ACETAMINOPHEN 1 TABLET: 10; 325 TABLET ORAL at 17:15

## 2022-02-02 RX ADMIN — TETRAKIS(2-METHOXYISOBUTYLISOCYANIDE)COPPER(I) TETRAFLUOROBORATE 30 MILLICURIE: 1 INJECTION, POWDER, LYOPHILIZED, FOR SOLUTION INTRAVENOUS at 09:00

## 2022-02-02 RX ADMIN — WATER 2 G: 1 INJECTION INTRAMUSCULAR; INTRAVENOUS; SUBCUTANEOUS at 09:20

## 2022-02-02 RX ADMIN — OXYCODONE AND ACETAMINOPHEN 1 TABLET: 10; 325 TABLET ORAL at 03:00

## 2022-02-02 RX ADMIN — METOPROLOL TARTRATE 12.5 MG: 25 TABLET, FILM COATED ORAL at 09:20

## 2022-02-02 RX ADMIN — METRONIDAZOLE 500 MG: 250 TABLET ORAL at 09:20

## 2022-02-02 NOTE — PROGRESS NOTES
[address] [place] [address]  [phone]      Cardiology Progress Note      2/2/2022 230 PM    Admit Date: 1/26/2022    Admit Diagnosis:   Sepsis ([address] [A41.9]  UTI (urinary tract infection) [N39.0]  Decubitus ulcer of ankle, stage 4 (HCC) [L89.504]    Interval History/Subjective:     Tata Giang is a 23 y.o. male with PMH paraplegia who was  admitted for Sepsis ([address] [A41.9]  UTI (urinary tract infection) [N39.0]  Decubitus ulcer of ankle, stage 4 ([address] [L89.504]. -fever overnight   -Hg 6.9  -Mr. Darin Lima is feeling okay. Denies current pain or SOB or palpitations. Wants to go home.      Visit Vitals  /68   Pulse 99   Temp 98.2 °F (36.8 °C)   Resp 18   Ht 6' (1.829 m)   Wt 43.1 kg (95 lb 0.3 oz)   SpO2 98%   BMI 12.89 kg/m²       Current Facility-Administered Medications   Medication Dose Route Frequency    metroNIDAZOLE (FLAGYL) tablet 500 mg  500 mg Oral Q12H    ceFAZolin (ANCEF) 2 g in sterile water (preservative free) 20 mL IV syringe  2 g IntraVENous Q6H    acetaminophen (TYLENOL) tablet 650 mg  650 mg Oral Q6H PRN    bacitracin 500 unit/gram packet 1 Packet  1 Packet Topical PRN    heparin (porcine) pf 300 Units  300 Units InterCATHeter PRN    metoprolol tartrate (LOPRESSOR) tablet 12.5 mg  12.5 mg Oral BID    klack's mouthwash oral suspension (COMPOUNDED)  5 mL Oral Q6H    benzocaine-menthoL (CHLORASEPTIC MAX) lozenge 1 Lozenge  1 Lozenge Oral T4G PRN    folic acid (FOLVITE) tablet 1 mg  1 mg Oral DAILY    enoxaparin (LOVENOX) injection 30 mg  30 mg SubCUTAneous DAILY    morphine injection 2 mg  2 mg IntraVENous Q3H PRN    oxyCODONE-acetaminophen (PERCOCET 10)  mg per tablet 1 Tablet  1 Tablet Oral Q4H PRN    HYDROmorphone (DILAUDID) injection 0.5 mg  0.5 mg IntraVENous Q2H PRN    0.9% sodium chloride infusion 250 mL  250 mL IntraVENous PRN    sodium hypochlorite (QUARTER STRENGTH DAKIN'S) 0.125% irrigation (bottle)   Topical DAILY    sodium chloride (NS) flush 5-40 mL  5-40 mL IntraVENous Q8H    sodium chloride (NS) flush 5-40 mL  5-40 mL IntraVENous PRN    polyethylene glycol (MIRALAX) packet 17 g  17 g Oral DAILY PRN    ondansetron (ZOFRAN ODT) tablet 4 mg  4 mg Oral Q8H PRN    Or    ondansetron (ZOFRAN) injection 4 mg  4 mg IntraVENous Q6H PRN    L.acidophilus-paracasei-S.thermophil-bifidobacter (RISAQUAD) 8 billion cell capsule  1 Capsule Oral DAILY       Objective:      Physical Exam:  General Appearance:  adult AAM resting in bed in NAD  Chest:   speaking full sentences;  clear  Cardiovascular:   RRR, no MRG  Abdomen:   flat, soft, nontender   Extremities: LE wasting. Skin:  wound not visualized. Dressing in place          Data Review:   Recent Labs     02/02/22 0245 02/01/22 0642 02/01/22 0103 01/31/22  0636 01/31/22  0636   WBC 11.6*  --  11.4*  --  10.2   HGB 6.9* 6.8* 6.9*   < > 7.8*   HCT 23.7* 23.4* 23.4*   < > 27.7*   *  --  541*  --  705*    < > = values in this interval not displayed. Recent Labs     02/02/22 0245 02/01/22 0103 01/31/22  0636    138 137   K 4.3 4.2 3.8    107 107   CO2 30 28 26   GLU 99 88 80   BUN 6 4* 4*   CREA 0.25* 0.17* 0.25*   CA 7.9* 7.7* 8.0*   MG  --  1.9  --        No results for input(s): TROIQ, CPK, CKMB in the last 72 hours. Intake/Output Summary (Last 24 hours) at 2/2/2022 1521  Last data filed at 2/2/2022 0802  Gross per 24 hour   Intake --   Output 900 ml   Net -900 ml        Telemetry: SR to ST with NSVT   ECG: SVT vs a flutter   Echocardiogram:  EF 60-65%  CXRAY: no acute process     Assessment:     Active Problems:    UTI (urinary tract infection) (10/13/2017)      [facility]is ([facility] (1/26/2022)      Decubitus ulcer of ankle, stage 4 ([facility] (1/26/2022)      NSVT (nonsustained ventricular tachycardia) ([facility] (1/31/2022)        Plan:     NSVT:  Continued bursts. In setting of sepsis from decubitus ulcer. EF 60-65%. No further atrial tach/SVT bursts   · Continue BB.   BP too borderline to increase dose. · Patient refused 2nd part of stress test again today. Unsure what partial test will show. Sepsis/decubitus ulcer:  Improving  · Per hospitalist/ID    Carmen Knapp, DONELL  DNP, RN, [facility]      Patient seen and examined by me with nurse practitioner. I personally performed all components of the history, physical, and medical decision making and agree with the assessment and plan as noted. Patient refusing stress test. Continue BB. Normal LVEF. Will follow as needed. Please call if can be of any further assistance.      Cortes Nava MD

## 2022-02-02 NOTE — PROGRESS NOTES
Problem: Pressure Injury - Risk of  Goal: *Prevention of pressure injury  Description: Document Dimitri Scale and appropriate interventions in the flowsheet. Outcome: Progressing Towards Goal  Note: Pressure Injury Interventions:  Sensory Interventions: Assess changes in LOC,Minimize linen layers,Maintain/enhance activity level    Moisture Interventions: Absorbent underpads,Minimize layers    Activity Interventions: Pressure redistribution bed/mattress(bed type),PT/OT evaluation    Mobility Interventions: Pressure redistribution bed/mattress (bed type),PT/OT evaluation    Nutrition Interventions: Document food/fluid/supplement intake    Friction and Shear Interventions: Minimize layers                Problem: Patient Education: Go to Patient Education Activity  Goal: Patient/Family Education  Outcome: Progressing Towards Goal     Problem: Falls - Risk of  Goal: *Absence of Falls  Description: Document Ezekiel Fall Risk and appropriate interventions in the flowsheet. Outcome: Progressing Towards Goal  Note: Fall Risk Interventions:  Mobility Interventions: Bed/chair exit alarm,Communicate number of staff needed for ambulation/transfer,Patient to call before getting OOB         Medication Interventions: Bed/chair exit alarm,Evaluate medications/consider consulting pharmacy    Elimination Interventions: Bed/chair exit alarm,Call light in reach              Problem: Patient Education: Go to Patient Education Activity  Goal: Patient/Family Education  Outcome: Progressing Towards Goal     Problem: Impaired Skin Integrity/Pressure Injury Treatment  Goal: *Improvement of Existing Pressure Injury  Outcome: Progressing Towards Goal  Goal: *Prevention of pressure injury  Description: Document Dimitri Scale and appropriate interventions in the flowsheet.   Outcome: Progressing Towards Goal  Note: Pressure Injury Interventions:  Sensory Interventions: Assess changes in LOC,Minimize linen layers,Maintain/enhance activity level    Moisture Interventions: Absorbent underpads,Minimize layers    Activity Interventions: Pressure redistribution bed/mattress(bed type),PT/OT evaluation    Mobility Interventions: Pressure redistribution bed/mattress (bed type),PT/OT evaluation    Nutrition Interventions: Document food/fluid/supplement intake    Friction and Shear Interventions: Minimize layers                Problem: Patient Education: Go to Patient Education Activity  Goal: Patient/Family Education  Outcome: Progressing Towards Goal     Problem: Urinary Tract Infection - Adult  Goal: *Absence of infection signs and symptoms  Outcome: Progressing Towards Goal     Problem: Patient Education: Go to Patient Education Activity  Goal: Patient/Family Education  Outcome: Progressing Towards Goal     Problem: Sepsis: Day 3  Goal: *Tolerating diet  Outcome: Progressing Towards Goal     Problem: Sepsis: Day 4  Goal: Nutrition/Diet  Outcome: Progressing Towards Goal  Goal: Discharge Planning  Outcome: Progressing Towards Goal     Problem: Sepsis: Day 6  Goal: Off Pathway (Use only if patient is Off Pathway)  Outcome: Progressing Towards Goal  Goal: *Oxygen saturation within defined limits  Outcome: Progressing Towards Goal  Goal: *Vital signs within defined limits  Outcome: Progressing Towards Goal  Goal: *Tolerating diet  Outcome: Progressing Towards Goal  Goal: *Demonstrates progressive activity  Outcome: Progressing Towards Goal  Goal: Influenza immunization  Outcome: Progressing Towards Goal  Goal: *Pneumococcal immunization  Outcome: Progressing Towards Goal  Goal: Activity/Safety  Outcome: Progressing Towards Goal  Goal: Diagnostic Test/Procedures  Outcome: Progressing Towards Goal  Goal: Nutrition/Diet  Outcome: Progressing Towards Goal  Goal: Discharge Planning  Outcome: Progressing Towards Goal  Goal: Medications  Outcome: Progressing Towards Goal  Goal: Respiratory  Outcome: Progressing Towards Goal  Goal: Treatments/Interventions/Procedures  Outcome: Progressing Towards Goal  Goal: Psychosocial  Outcome: Progressing Towards Goal     Problem: Sepsis: Discharge Outcomes  Goal: *Vital signs within defined limits  Outcome: Progressing Towards Goal  Goal: *Tolerating diet  Outcome: Progressing Towards Goal  Goal: *Verbalizes understanding and describes prescribed diet  Outcome: Progressing Towards Goal  Goal: *Demonstrates progressive activity  Outcome: Progressing Towards Goal  Goal: *Describes follow-up/return visits to physicians  Outcome: Progressing Towards Goal  Goal: *Verbalizes name, dosage, time, side effects, and number of days to continue medications  Outcome: Progressing Towards Goal  Goal: *Influenza immunization (Oct-Mar only)  Outcome: Progressing Towards Goal  Goal: *Pneumococcal immunization  Outcome: Progressing Towards Goal  Goal: *Lungs clear or at baseline  Outcome: Progressing Towards Goal  Goal: *Oxygen saturation returns to baseline or 90% or better on room air  Outcome: Progressing Towards Goal  Goal: *Glycemic control  Outcome: Progressing Towards Goal  Goal: *Absence of deep venous thrombosis signs and symptoms(Stroke Metric)  Outcome: Progressing Towards Goal  Goal: *Describes available resources and support systems  Outcome: Progressing Towards Goal  Goal: *Optimal pain control at patient's stated goal  Outcome: Progressing Towards Goal
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
## 2022-02-02 NOTE — PROGRESS NOTES
0730 Bedside shift change report given to [address] (oncoming nurse) by Berny Jauregui RN (offgoing nurse). Report included the following information SBAR and Kardex.

## 2022-02-02 NOTE — PROGRESS NOTES
1931: Patient temperature is 101.2. Messaged NP for tylenol order. Got orders for paired blood cultures and lactic acid but patient is refusing lab draws.
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
## 2022-02-02 NOTE — DISCHARGE INSTRUCTIONS
HOSPITALIST DISCHARGE INSTRUCTIONS    NAME: Isaiah Nageotte   :  1995   MRN:  [id number]     Date/Time:  2022 4:09 PM    ADMIT DATE: 2022     DISCHARGE DATE: 2022     DISCHARGE DIAGNOSIS:  Streptococcal & Staphylococcal sp Bacteremia POA- Cont IV Abx Ancef till 3/11/2022 via PICC line, OP ID followup with Dr Dave Bahena UTI POA  Causing severe Sepsis POA- resolved  Stage IV sacral decubitus ulcers POA  Paraplegia-wheelchair-bound  Oropharyngeal Candidiasis ()  Paroxsymal Vtach- started on low dose Metoprolol, Echo ok, s/p Incomplete Stress test as pt refused the second part of stress test  Normocytic anemia  Full code    Active Problems:    UTI (urinary tract infection) (10/13/2017)      Sepsis ([facility] [address] (2022)      Decubitus ulcer of ankle, stage 4 ([address] (2022)      NSVT (nonsustained ventricular tachycardia) ([facility] [address] (2022)         MEDICATIONS:  As per medication reconciliation  list  · It is important that you take the medication exactly as they are prescribed. · Keep your medication in the bottles provided by the pharmacist and keep a list of the medication names, dosages, and times to be taken in your wallet. · Do not take other medications without consulting your doctor. Pain Management: per above medications    What to do at Home    Recommended diet:  Cardiac Diet    Recommended activity: Activity as tolerated    If you have questions regarding the hospital related prescriptions or hospital related issues please call [facility] osmin Monsalve at [phone].     If you experience any of the following symptoms then please call your primary care physician or return to the emergency room if you cannot get hold of your doctor:  Fever, chills, nausea, vomiting, diarrhea, change in mentation, falling, bleeding, shortness of breath,     Follow Up:  Dr Hima Marr (ID doctor) as arranged with Virtual visit  [facility] wound care clinic as desired  [facility] for IV Abx and home wound care. Information obtained by :  I understand that if any problems occur once I am at home I am to contact my physician. I understand and acknowledge receipt of the instructions indicated above.                                                                                                                                            Physician's or R.N.'s Signature                                                                  Date/Time                                                                                                                                              Patient or Representative Signature                                                          Date/Time
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
## 2022-02-02 NOTE — PROGRESS NOTES
Received notification from bedside RN about patient with regards to: fever, needs PRN medication  VS: Temp 101.2, /76, HR 86, RR 16, O2 sat 100% on RA    Intervention given: Tylenol PO PRN, lactic acid and pair BC ordered

## 2022-02-02 NOTE — PROGRESS NOTES
0700: End of Shift Note    Bedside shift change report given to MARTIN PICKARD RN (oncoming nurse) by Yi Jaramillo (offgoing nurse). Report included the following information SBAR, Kardex, Intake/Output, MAR, Recent Results and Cardiac Rhythm Sinus Tach    Shift worked:  Night     Shift summary and any significant changes:     Wound care done once overnight. Patient medicated for pain multiple times throughout the night. Lactic drawn at 42-30-72-51 for fever but resulted under 02/01/2022. Concerns for physician to address:       Zone phone for oncoming shift:          Activity:  Activity Level: Bed Rest  Number times ambulated in hallways past shift: 0  Number of times OOB to chair past shift: 0    Cardiac:   Cardiac Monitoring: Yes      Cardiac Rhythm: Sinus Tachy    Access:   Current line(s): PIV     Genitourinary:   Urinary status: voiding    Respiratory:   O2 Device: None (Room air)  Chronic home O2 use?: NO  Incentive spirometer at bedside: NO     GI:  Last Bowel Movement Date: 02/01/22  Current diet:  ADULT ORAL NUTRITION SUPPLEMENT Breakfast, Lunch, Dinner; Standard High Calorie/High Protein  ADULT DIET Regular  Passing flatus: YES  Tolerating current diet: YES       Pain Management:   Patient states pain is manageable on current regimen: YES    Skin:  Dimitri Score: 13  Interventions: increase time out of bed and PT/OT consult    Patient Safety:  Fall Score:  Total Score: 2  Interventions: bed/chair alarm, gripper socks and pt to call before getting OOB  High Fall Risk: Yes    Length of Stay:  Expected LOS: - - -  Actual LOS: 7      Esther Lewis

## 2022-02-02 NOTE — PROGRESS NOTES
Nuc Med: Alisa Pemberton to dose patient for resting images and now patient is refusing again. Study will be terminated and processed as is.

## 2022-02-02 NOTE — PROGRESS NOTES
Transition of Care Plan:     RUR:14% moderate risk  Disposition: home with HH ([address] and IV antibiotics and [facility]  Follow up appointments:pt needs new PCP and specialists as indicated  DME needed:pt has a w/c  Transportation at [address] will transport  [address] or means to access home: pt has access       IM Medicare Letter:n/a  Is patient a BCPI-A Bundle:n/a                      If yes, was Bundle Letter given?:    Is patient a  and connected with the [place]?                If yes, was [employer] transfer form completed and [facility] notified? Caregiver Contact:aunt Daija Fonseca [phone]  Discharge Caregiver contacted prior to discharge? Care Conference needed?:n/a    4:00pm-No further CM needs identified. CM notified pt's nurse of d/c.    3:40pm- CM informed Georgiana Hanley with [facility] that pt will be discharging today    3:30pm- CM met with pt at bedside to discuss d/c plan. Pt inquired if he could leave. CM informed pt that he would need to get with the MD. CM informed pt that everything has been arranged except for the DME. Pt was ready to leave and stated that he did not want CM to order DME. CM informed pt that he could order DME from the [address] [place] [address]. 12:45pm- Georgiana Hanley with [facility] did IV teaching with pt.     12:30pm- [address] accepted referral for [facility]. AVS updated. 11:00am-  sent referrral to [facility] for home health via [employer]    10:05am- Georgiana Hanley with [facility] [phone] called CM via phone to inform CM that pt is 100% covered and if CM needed assistance with finding home health. CM informed Georgiana Hanley that CM needed assistance with finding home health.     9:50am- CM sent referral to multiple [facility] agencies via [address] [facility] and [employer]. 9:30am- CM sent referral to [facility] for IV antibiotics via [employer]. 9:00am- CM met with pt at bedside to discuss d/c plan. CM informed pt that CM is trying to arrange home health and IV antibiotics for him.  CM inquired with pt about address he will be living at. Pt provided CM with address:  [address] [facility] [id number]. .    Care Management Interventions  PCP Verified by CM: No  Palliative Care Criteria Met (RRAT>21 & CHF Dx)?: No  Mode of Transport at Discharge:  (Pt's girlfrient will transport at d/c.)  Transition of Care Consult (CM Consult): Home Health (Home with [address] and IV antibiotics)  [facility] - INPATIENT: No  Reason Outside [place]: Unable to staff case  Discharge Durable Medical Equipment: No  Physical Therapy Consult: No  Occupational Therapy Consult: No  Speech Therapy Consult: No  Support Systems: Spouse/Significant Other,Other Family Member(s)  Confirm Follow Up Transport: Other (see comment)  [employer] Resource Information Provided?: No  Discharge Location  Patient Expects to be Discharged to[de-identified]  (Home with Home Health ([address]) and IV antibiotics)    Patricia Eisenmenger, [address]  [phone]

## 2022-02-02 NOTE — PROGRESS NOTES
Nuc Med: spoke with Adrianna Farias this morning and he is agreeing to resting images to complete his NucMed MPI study. Dose ordered. Will dose pt approximately at 9:30 am and schedule for 10:15 for images. Pt may eat prior to this procedure.

## 2022-02-03 LAB
BACTERIA SPEC CULT: NORMAL
SERVICE CMNT-IMP: NORMAL

## 2022-02-03 NOTE — PROGRESS NOTES
Physician Progress Note      PATIENT:               Nadia Polk  CSN #:                  [id number]  :                       1995  ADMIT DATE:       2022 12:57 AM  DISCH DATE:        2022 5:53 PM  RESPONDING  PROVIDER #:        Franklin Lopez MD          QUERY TEXT:    Dr Lisa Gonzalez  Pt admitted with UTI. Pt noted to straight cath at home. If possible, please document in the progress notes and discharge summary if you are evaluating and/or treating any of the following: The medical record reflects the following:  Risk Factors: presents with sepsis, UTI, DUx2; paraplegia  Clinical Indicators: noted to self cath at home  Treatment: Maxipime, 1 liter NS bolus, Vancomycin,  Options provided:  -- UTI due to self catheterization  -- UTI not due to self catheterization  -- Other - I will add my own diagnosis  -- Disagree - Not applicable / Not valid  -- Disagree - Clinically unable to determine / Unknown  -- Refer to Clinical Documentation Reviewer    PROVIDER RESPONSE TEXT:    UTI is due to self catheterization. Query created by:  Anurag Self on 2/3/2022 12:53 PM      Electronically signed by:  Franklin Lopez MD 2/3/2022 12:56 PM

## 2022-02-03 NOTE — PROGRESS NOTES
DISCHARGE SUMMARY FROM [facility] NURSE    The patient is stable for discharge. I have reviewed the discharge instructions with the patient. The patient verbalized understanding. All questions were fully answered. The patient verbalized no complaints. Hard scripts and medication handouts were given and reviewed with the patient. Appropriate educational materials and medication side effects teaching were also provided. Cardiac monitor and IV line(s) were removed. The following personal items collected during admission were returned to the patient/family  Home medications: None   Dental Appliance: Dental Appliances: None  Vision: Visual Aid: None  Hearing Aid:    Jewelry: Jewelry: None  Clothing: Clothing: At bedside  Other Valuables: Other Valuables: Cell Phone  Valuables sent to safe: There were no personal belongings, valuables or home medications left at patient's bedside,  or safe.

## 2022-02-09 ENCOUNTER — TELEPHONE (OUTPATIENT)
Dept: FAMILY MEDICINE CLINIC | Age: 27
End: 2022-02-09

## 2022-02-09 NOTE — TELEPHONE ENCOUNTER
Spoke w/ Caron at [employer]; two patient identifiers confirmed. Informed that lab results were received via fax and given to Dr. Veronica Jang for review this morning. Caron verbalized understanding.     Keshia Collazo LPN

## 2022-02-09 NOTE — TELEPHONE ENCOUNTER
Pls call Crystal with [facility]     Wants to confirm labs were received by this office (she faxed them)     Best number to reach her is [phone]

## 2022-02-14 ENCOUNTER — TELEPHONE (OUTPATIENT)
Dept: FAMILY MEDICINE CLINIC | Age: 27
End: 2022-02-14

## 2022-02-14 NOTE — TELEPHONE ENCOUNTER
Richa gr/[facility]     She saw patient today - reports he has not gone to [facility] for [facility]t  He has PIC line that has no blood return   Vitals - Pulse 120  BP 90/60 Temp 99.6    She does not think infection is getting better  His living environment is very dirty     Best number to reach her is [phone]

## 2022-02-15 NOTE — TELEPHONE ENCOUNTER
Spoke with [facility] [employer]. She said that PICC flushing, just can't get blood return. She said that patient is moving around. Sleeping on floor on a mattress. Wound dirty & draining through dressing. Having leg pain. Temp 99.7 tem , Bp 90/60 P-120 and he feels warm to touch. Dr. Huston Ards notified. Per Dr. Huston Ards patient needs to go to ER. [facility] said that she would notify patient.

## 2022-02-15 NOTE — TELEPHONE ENCOUNTER
Please ask nurse to flush PICC per their protocol. Use cath flow if needed. If picc not working send to ED.

## 2022-02-17 ENCOUNTER — TELEPHONE (OUTPATIENT)
Dept: FAMILY MEDICINE CLINIC | Age: 27
End: 2022-02-17

## 2022-02-17 NOTE — TELEPHONE ENCOUNTER
Called home health manager, PICC line was removed safely and without incident. Pt directed to seek ED if he requires or desires care. No further concerns at this time. Home health has discharged him.

## 2022-02-17 NOTE — TELEPHONE ENCOUNTER
They may remove PICC due to unsafe situation .  If patient requires further care please ask them to direct him to ED

## 2022-02-17 NOTE — TELEPHONE ENCOUNTER
Alexys gr/[facility]     States she cannot reach patient by phone and went to address and was told he doesn't live there     PSR did let her know that Holly Pruitt from her company made contact with patient on 2/14/22 and had they checked with her (?) She said evette is no longer with the company     Pls advise       Best number to reach her is [phone]

## 2022-02-17 NOTE — TELEPHONE ENCOUNTER
Called and verified name and  with home health nurse, multiple nurses have tried to get a hold of him, phone is not being answered, address is incorrect as someone else lives there, gave alternative address to home health nurse to try.

## 2022-02-22 ENCOUNTER — APPOINTMENT (OUTPATIENT)
Dept: CT IMAGING | Age: 27
DRG: 710 | End: 2022-02-22
Attending: EMERGENCY MEDICINE
Payer: MEDICAID

## 2022-02-22 ENCOUNTER — HOSPITAL ENCOUNTER (INPATIENT)
Age: 27
LOS: 10 days | Discharge: HOME HEALTH | DRG: 710 | End: 2022-03-04
Attending: EMERGENCY MEDICINE | Admitting: INTERNAL MEDICINE
Payer: MEDICAID

## 2022-02-22 ENCOUNTER — APPOINTMENT (OUTPATIENT)
Dept: GENERAL RADIOLOGY | Age: 27
DRG: 710 | End: 2022-02-22
Attending: EMERGENCY MEDICINE
Payer: MEDICAID

## 2022-02-22 DIAGNOSIS — B96.20 E. COLI UTI: ICD-10-CM

## 2022-02-22 DIAGNOSIS — N39.0 E. COLI URINARY TRACT INFECTION: ICD-10-CM

## 2022-02-22 DIAGNOSIS — R78.81 STREPTOCOCCAL BACTEREMIA: ICD-10-CM

## 2022-02-22 DIAGNOSIS — R19.7 DIARRHEA OF PRESUMED INFECTIOUS ORIGIN: ICD-10-CM

## 2022-02-22 DIAGNOSIS — M86.652 CHRONIC OSTEOMYELITIS OF PELVIS, LEFT (HCC): ICD-10-CM

## 2022-02-22 DIAGNOSIS — M86.651 CHRONIC OSTEOMYELITIS OF PELVIS, RIGHT (HCC): ICD-10-CM

## 2022-02-22 DIAGNOSIS — R78.81 BACTEREMIA DUE TO GRAM-NEGATIVE BACTERIA: ICD-10-CM

## 2022-02-22 DIAGNOSIS — G82.20 PARAPLEGIA (HCC): ICD-10-CM

## 2022-02-22 DIAGNOSIS — N30.00 ACUTE CYSTITIS WITHOUT HEMATURIA: ICD-10-CM

## 2022-02-22 DIAGNOSIS — M00.9 PYOGENIC ARTHRITIS OF RIGHT HIP, DUE TO UNSPECIFIED ORGANISM (HCC): Primary | ICD-10-CM

## 2022-02-22 DIAGNOSIS — B95.5 STREPTOCOCCAL BACTEREMIA: ICD-10-CM

## 2022-02-22 DIAGNOSIS — A49.8 PSEUDOMONAS INFECTION: ICD-10-CM

## 2022-02-22 DIAGNOSIS — A41.9 SEPSIS WITHOUT ACUTE ORGAN DYSFUNCTION, DUE TO UNSPECIFIED ORGANISM (HCC): ICD-10-CM

## 2022-02-22 DIAGNOSIS — A49.8 INFECTION DUE TO DIPHTHEROID BACTERIA: ICD-10-CM

## 2022-02-22 DIAGNOSIS — R65.20 SEVERE SEPSIS (HCC): ICD-10-CM

## 2022-02-22 DIAGNOSIS — S31.000A WOUND OF SACRAL REGION, INITIAL ENCOUNTER: ICD-10-CM

## 2022-02-22 DIAGNOSIS — M00.051 STAPHYLOCOCCAL ARTHRITIS OF RIGHT HIP (HCC): ICD-10-CM

## 2022-02-22 DIAGNOSIS — R78.81 STAPHYLOCOCCUS AUREUS BACTEREMIA: ICD-10-CM

## 2022-02-22 DIAGNOSIS — B95.7 COAGULASE-NEGATIVE STAPHYLOCOCCAL INFECTION: ICD-10-CM

## 2022-02-22 DIAGNOSIS — I47.29 NSVT (NONSUSTAINED VENTRICULAR TACHYCARDIA): ICD-10-CM

## 2022-02-22 DIAGNOSIS — A04.8: ICD-10-CM

## 2022-02-22 DIAGNOSIS — A41.9 SEVERE SEPSIS (HCC): ICD-10-CM

## 2022-02-22 DIAGNOSIS — L89.514 PRESSURE INJURY OF RIGHT ANKLE, STAGE 4 (HCC): ICD-10-CM

## 2022-02-22 DIAGNOSIS — B96.20 E. COLI URINARY TRACT INFECTION: ICD-10-CM

## 2022-02-22 DIAGNOSIS — M00.851 ARTHRITIS OF RIGHT HIP DUE TO OTHER BACTERIA (HCC): ICD-10-CM

## 2022-02-22 DIAGNOSIS — B95.61 STAPHYLOCOCCUS AUREUS BACTEREMIA: ICD-10-CM

## 2022-02-22 DIAGNOSIS — N39.0 E. COLI UTI: ICD-10-CM

## 2022-02-22 DIAGNOSIS — I47.29 NONSUSTAINED VENTRICULAR TACHYCARDIA: ICD-10-CM

## 2022-02-22 LAB
ALBUMIN SERPL-MCNC: 2 G/DL (ref 3.5–5)
ALBUMIN/GLOB SERPL: 0.3 {RATIO} (ref 1.1–2.2)
ALP SERPL-CCNC: 134 U/L (ref 45–117)
ALT SERPL-CCNC: 9 U/L (ref 12–78)
ANION GAP SERPL CALC-SCNC: 6 MMOL/L (ref 5–15)
AST SERPL-CCNC: 11 U/L (ref 15–37)
BASE EXCESS BLD CALC-SCNC: 1.9 MMOL/L
BASOPHILS # BLD: 0 K/UL (ref 0–0.1)
BASOPHILS NFR BLD: 0 % (ref 0–1)
BILIRUB SERPL-MCNC: 0.2 MG/DL (ref 0.2–1)
BUN SERPL-MCNC: 5 MG/DL (ref 6–20)
BUN/CREAT SERPL: 12 (ref 12–20)
CA-I BLD-MCNC: 1.18 MMOL/L (ref 1.12–1.32)
CALCIUM SERPL-MCNC: 8.5 MG/DL (ref 8.5–10.1)
CHLORIDE BLD-SCNC: 99 MMOL/L (ref 100–108)
CHLORIDE SERPL-SCNC: 99 MMOL/L (ref 97–108)
CO2 BLD-SCNC: 28 MMOL/L (ref 19–24)
CO2 SERPL-SCNC: 29 MMOL/L (ref 21–32)
CREAT SERPL-MCNC: 0.42 MG/DL (ref 0.7–1.3)
CREAT UR-MCNC: <0.3 MG/DL (ref 0.6–1.3)
CRP SERPL-MCNC: 12.9 MG/DL (ref 0–0.6)
DIFFERENTIAL METHOD BLD: ABNORMAL
EOSINOPHIL # BLD: 0.1 K/UL (ref 0–0.4)
EOSINOPHIL NFR BLD: 1 % (ref 0–7)
ERYTHROCYTE [DISTWIDTH] IN BLOOD BY AUTOMATED COUNT: 18.2 % (ref 11.5–14.5)
ERYTHROCYTE [SEDIMENTATION RATE] IN BLOOD: 99 MM/HR (ref 0–15)
GLOBULIN SER CALC-MCNC: 6.4 G/DL (ref 2–4)
GLUCOSE BLD STRIP.AUTO-MCNC: 85 MG/DL (ref 74–106)
GLUCOSE SERPL-MCNC: 85 MG/DL (ref 65–100)
HCO3 BLDA-SCNC: 27 MMOL/L
HCT VFR BLD AUTO: 30.2 % (ref 36.6–50.3)
HGB BLD-MCNC: 8.5 G/DL (ref 12.1–17)
IMM GRANULOCYTES # BLD AUTO: 0 K/UL (ref 0–0.04)
IMM GRANULOCYTES NFR BLD AUTO: 0 % (ref 0–0.5)
LACTATE BLD-SCNC: 1.33 MMOL/L (ref 0.4–2)
LYMPHOCYTES # BLD: 1.9 K/UL (ref 0.8–3.5)
LYMPHOCYTES NFR BLD: 27 % (ref 12–49)
MCH RBC QN AUTO: 23.9 PG (ref 26–34)
MCHC RBC AUTO-ENTMCNC: 28.1 G/DL (ref 30–36.5)
MCV RBC AUTO: 85.1 FL (ref 80–99)
MONOCYTES # BLD: 0.8 K/UL (ref 0–1)
MONOCYTES NFR BLD: 12 % (ref 5–13)
NEUTS SEG # BLD: 4.1 K/UL (ref 1.8–8)
NEUTS SEG NFR BLD: 60 % (ref 32–75)
NRBC # BLD: 0 K/UL (ref 0–0.01)
NRBC BLD-RTO: 0 PER 100 WBC
PCO2 BLDV: 43.5 MMHG (ref 41–51)
PH BLDV: 7.4 [PH] (ref 7.32–7.42)
PLATELET # BLD AUTO: 709 K/UL (ref 150–400)
PMV BLD AUTO: 8.3 FL (ref 8.9–12.9)
PO2 BLDV: 16 MMHG (ref 25–40)
POTASSIUM BLD-SCNC: 3.6 MMOL/L (ref 3.5–5.5)
POTASSIUM SERPL-SCNC: 3.5 MMOL/L (ref 3.5–5.1)
PROT SERPL-MCNC: 8.4 G/DL (ref 6.4–8.2)
RBC # BLD AUTO: 3.55 M/UL (ref 4.1–5.7)
RBC MORPH BLD: ABNORMAL
RBC MORPH BLD: ABNORMAL
SODIUM BLD-SCNC: 139 MMOL/L (ref 136–145)
SODIUM SERPL-SCNC: 134 MMOL/L (ref 136–145)
SPECIMEN SITE: ABNORMAL
TROPONIN-HIGH SENSITIVITY: 4 NG/L (ref 0–76)
WBC # BLD AUTO: 6.9 K/UL (ref 4.1–11.1)

## 2022-02-22 PROCEDURE — 81001 URINALYSIS AUTO W/SCOPE: CPT

## 2022-02-22 PROCEDURE — 96365 THER/PROPH/DIAG IV INF INIT: CPT

## 2022-02-22 PROCEDURE — 85652 RBC SED RATE AUTOMATED: CPT

## 2022-02-22 PROCEDURE — 74011000258 HC RX REV CODE- 258: Performed by: INTERNAL MEDICINE

## 2022-02-22 PROCEDURE — 96375 TX/PRO/DX INJ NEW DRUG ADDON: CPT

## 2022-02-22 PROCEDURE — 74011000258 HC RX REV CODE- 258: Performed by: EMERGENCY MEDICINE

## 2022-02-22 PROCEDURE — 80053 COMPREHEN METABOLIC PANEL: CPT

## 2022-02-22 PROCEDURE — 86140 C-REACTIVE PROTEIN: CPT

## 2022-02-22 PROCEDURE — 87040 BLOOD CULTURE FOR BACTERIA: CPT

## 2022-02-22 PROCEDURE — 85025 COMPLETE CBC W/AUTO DIFF WBC: CPT

## 2022-02-22 PROCEDURE — 74011250637 HC RX REV CODE- 250/637: Performed by: EMERGENCY MEDICINE

## 2022-02-22 PROCEDURE — 51702 INSERT TEMP BLADDER CATH: CPT

## 2022-02-22 PROCEDURE — 82947 ASSAY GLUCOSE BLOOD QUANT: CPT

## 2022-02-22 PROCEDURE — 99285 EMERGENCY DEPT VISIT HI MDM: CPT

## 2022-02-22 PROCEDURE — 74011000250 HC RX REV CODE- 250: Performed by: INTERNAL MEDICINE

## 2022-02-22 PROCEDURE — 74011000636 HC RX REV CODE- 636: Performed by: EMERGENCY MEDICINE

## 2022-02-22 PROCEDURE — 36415 COLL VENOUS BLD VENIPUNCTURE: CPT

## 2022-02-22 PROCEDURE — 93005 ELECTROCARDIOGRAM TRACING: CPT

## 2022-02-22 PROCEDURE — 84484 ASSAY OF TROPONIN QUANT: CPT

## 2022-02-22 PROCEDURE — 74011250636 HC RX REV CODE- 250/636: Performed by: INTERNAL MEDICINE

## 2022-02-22 PROCEDURE — 74011250636 HC RX REV CODE- 250/636: Performed by: EMERGENCY MEDICINE

## 2022-02-22 PROCEDURE — 71045 X-RAY EXAM CHEST 1 VIEW: CPT

## 2022-02-22 PROCEDURE — 65270000029 HC RM PRIVATE

## 2022-02-22 PROCEDURE — 87086 URINE CULTURE/COLONY COUNT: CPT

## 2022-02-22 PROCEDURE — 74177 CT ABD & PELVIS W/CONTRAST: CPT

## 2022-02-22 PROCEDURE — 74011250637 HC RX REV CODE- 250/637: Performed by: INTERNAL MEDICINE

## 2022-02-22 RX ORDER — SODIUM CHLORIDE 0.9 % (FLUSH) 0.9 %
5-40 SYRINGE (ML) INJECTION AS NEEDED
Status: DISCONTINUED | OUTPATIENT
Start: 2022-02-22 | End: 2022-03-05 | Stop reason: HOSPADM

## 2022-02-22 RX ORDER — SODIUM CHLORIDE 0.9 % (FLUSH) 0.9 %
5-10 SYRINGE (ML) INJECTION AS NEEDED
Status: DISCONTINUED | OUTPATIENT
Start: 2022-02-22 | End: 2022-02-26 | Stop reason: SDUPTHER

## 2022-02-22 RX ORDER — ENOXAPARIN SODIUM 100 MG/ML
40 INJECTION SUBCUTANEOUS DAILY
Status: DISCONTINUED | OUTPATIENT
Start: 2022-02-23 | End: 2022-03-05 | Stop reason: HOSPADM

## 2022-02-22 RX ORDER — ACETAMINOPHEN 325 MG/1
650 TABLET ORAL
Status: DISCONTINUED | OUTPATIENT
Start: 2022-02-22 | End: 2022-03-05 | Stop reason: HOSPADM

## 2022-02-22 RX ORDER — ACETAMINOPHEN 500 MG
1000 TABLET ORAL
Status: COMPLETED | OUTPATIENT
Start: 2022-02-22 | End: 2022-02-22

## 2022-02-22 RX ORDER — OXYCODONE HYDROCHLORIDE 5 MG/1
10 TABLET ORAL
Status: COMPLETED | OUTPATIENT
Start: 2022-02-22 | End: 2022-02-22

## 2022-02-22 RX ORDER — MORPHINE SULFATE 2 MG/ML
4 INJECTION, SOLUTION INTRAMUSCULAR; INTRAVENOUS
Status: COMPLETED | OUTPATIENT
Start: 2022-02-22 | End: 2022-02-22

## 2022-02-22 RX ORDER — POLYETHYLENE GLYCOL 3350 17 G/17G
17 POWDER, FOR SOLUTION ORAL DAILY PRN
Status: DISCONTINUED | OUTPATIENT
Start: 2022-02-22 | End: 2022-03-05 | Stop reason: HOSPADM

## 2022-02-22 RX ORDER — SODIUM CHLORIDE 0.9 % (FLUSH) 0.9 %
5-40 SYRINGE (ML) INJECTION EVERY 8 HOURS
Status: DISCONTINUED | OUTPATIENT
Start: 2022-02-22 | End: 2022-03-05 | Stop reason: HOSPADM

## 2022-02-22 RX ORDER — FENTANYL CITRATE 50 UG/ML
50 INJECTION, SOLUTION INTRAMUSCULAR; INTRAVENOUS
Status: COMPLETED | OUTPATIENT
Start: 2022-02-22 | End: 2022-02-22

## 2022-02-22 RX ORDER — MORPHINE SULFATE 2 MG/ML
1 INJECTION, SOLUTION INTRAMUSCULAR; INTRAVENOUS
Status: DISCONTINUED | OUTPATIENT
Start: 2022-02-22 | End: 2022-02-23

## 2022-02-22 RX ADMIN — PIPERACILLIN AND TAZOBACTAM 3.38 G: 3; .375 INJECTION, POWDER, LYOPHILIZED, FOR SOLUTION INTRAVENOUS at 17:36

## 2022-02-22 RX ADMIN — SODIUM CHLORIDE 1000 ML: 9 INJECTION, SOLUTION INTRAVENOUS at 18:17

## 2022-02-22 RX ADMIN — FENTANYL CITRATE 50 MCG: 50 INJECTION INTRAMUSCULAR; INTRAVENOUS at 16:34

## 2022-02-22 RX ADMIN — OXYCODONE 10 MG: 5 TABLET ORAL at 22:52

## 2022-02-22 RX ADMIN — MORPHINE SULFATE 4 MG: 2 INJECTION, SOLUTION INTRAMUSCULAR; INTRAVENOUS at 18:21

## 2022-02-22 RX ADMIN — MORPHINE SULFATE 1 MG: 2 INJECTION, SOLUTION INTRAMUSCULAR; INTRAVENOUS at 20:12

## 2022-02-22 RX ADMIN — IOPAMIDOL 100 ML: 755 INJECTION, SOLUTION INTRAVENOUS at 17:20

## 2022-02-22 RX ADMIN — VANCOMYCIN HYDROCHLORIDE 1000 MG: 1 INJECTION, POWDER, LYOPHILIZED, FOR SOLUTION INTRAVENOUS at 18:22

## 2022-02-22 RX ADMIN — ACETAMINOPHEN 1000 MG: 500 TABLET ORAL at 16:34

## 2022-02-22 RX ADMIN — PIPERACILLIN AND TAZOBACTAM 3.38 G: 3; .375 INJECTION, POWDER, LYOPHILIZED, FOR SOLUTION INTRAVENOUS at 19:53

## 2022-02-22 RX ADMIN — SODIUM CHLORIDE, PRESERVATIVE FREE 10 ML: 5 INJECTION INTRAVENOUS at 22:52
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
## 2022-02-23 ENCOUNTER — ANESTHESIA EVENT (OUTPATIENT)
Dept: SURGERY | Age: 27
DRG: 710 | End: 2022-02-23
Payer: MEDICAID

## 2022-02-23 LAB
ANION GAP SERPL CALC-SCNC: 5 MMOL/L (ref 5–15)
APPEARANCE UR: CLEAR
ATRIAL RATE: 109 BPM
BACTERIA URNS QL MICRO: ABNORMAL /HPF
BILIRUB UR QL: NEGATIVE
BUN SERPL-MCNC: 5 MG/DL (ref 6–20)
BUN/CREAT SERPL: 16 (ref 12–20)
CALCIUM SERPL-MCNC: 8.3 MG/DL (ref 8.5–10.1)
CALCULATED P AXIS, ECG09: 61 DEGREES
CALCULATED R AXIS, ECG10: 55 DEGREES
CALCULATED T AXIS, ECG11: 67 DEGREES
CHLORIDE SERPL-SCNC: 104 MMOL/L (ref 97–108)
CO2 SERPL-SCNC: 28 MMOL/L (ref 21–32)
COLOR UR: ABNORMAL
COVID-19 RAPID TEST, COVR: NOT DETECTED
CREAT SERPL-MCNC: 0.31 MG/DL (ref 0.7–1.3)
DIAGNOSIS, 93000: NORMAL
EPITH CASTS URNS QL MICRO: ABNORMAL /LPF
ERYTHROCYTE [DISTWIDTH] IN BLOOD BY AUTOMATED COUNT: 18 % (ref 11.5–14.5)
GLUCOSE SERPL-MCNC: 115 MG/DL (ref 65–100)
GLUCOSE UR STRIP.AUTO-MCNC: NEGATIVE MG/DL
HCT VFR BLD AUTO: 28.5 % (ref 36.6–50.3)
HGB BLD-MCNC: 8 G/DL (ref 12.1–17)
HGB UR QL STRIP: NEGATIVE
KETONES UR QL STRIP.AUTO: NEGATIVE MG/DL
LEUKOCYTE ESTERASE UR QL STRIP.AUTO: ABNORMAL
MCH RBC QN AUTO: 24.5 PG (ref 26–34)
MCHC RBC AUTO-ENTMCNC: 28.1 G/DL (ref 30–36.5)
MCV RBC AUTO: 87.4 FL (ref 80–99)
NITRITE UR QL STRIP.AUTO: NEGATIVE
NRBC # BLD: 0 K/UL (ref 0–0.01)
NRBC BLD-RTO: 0 PER 100 WBC
P-R INTERVAL, ECG05: 140 MS
PH UR STRIP: 8 [PH] (ref 5–8)
PLATELET # BLD AUTO: 690 K/UL (ref 150–400)
PMV BLD AUTO: 8.4 FL (ref 8.9–12.9)
POTASSIUM SERPL-SCNC: 3.8 MMOL/L (ref 3.5–5.1)
PROT UR STRIP-MCNC: NEGATIVE MG/DL
Q-T INTERVAL, ECG07: 338 MS
QRS DURATION, ECG06: 84 MS
QTC CALCULATION (BEZET), ECG08: 455 MS
RBC # BLD AUTO: 3.26 M/UL (ref 4.1–5.7)
RBC #/AREA URNS HPF: ABNORMAL /HPF (ref 0–5)
SODIUM SERPL-SCNC: 137 MMOL/L (ref 136–145)
SOURCE, COVRS: NORMAL
SP GR UR REFRACTOMETRY: 1.03 (ref 1–1.03)
UA: UC IF INDICATED,UAUC: ABNORMAL
UROBILINOGEN UR QL STRIP.AUTO: 0.2 EU/DL (ref 0.2–1)
VENTRICULAR RATE, ECG03: 109 BPM
WBC # BLD AUTO: 6.4 K/UL (ref 4.1–11.1)
WBC URNS QL MICRO: ABNORMAL /HPF (ref 0–4)

## 2022-02-23 PROCEDURE — 99221 1ST HOSP IP/OBS SF/LOW 40: CPT | Performed by: SURGERY

## 2022-02-23 PROCEDURE — 74011250637 HC RX REV CODE- 250/637: Performed by: EMERGENCY MEDICINE

## 2022-02-23 PROCEDURE — 74011250636 HC RX REV CODE- 250/636: Performed by: INTERNAL MEDICINE

## 2022-02-23 PROCEDURE — 80048 BASIC METABOLIC PNL TOTAL CA: CPT

## 2022-02-23 PROCEDURE — 77030040393 HC DRSG OPTIFOAM GENT MDII -B

## 2022-02-23 PROCEDURE — 87635 SARS-COV-2 COVID-19 AMP PRB: CPT

## 2022-02-23 PROCEDURE — 65660000000 HC RM CCU STEPDOWN

## 2022-02-23 PROCEDURE — 77030040392 HC DRSG OPTIFOAM MDII -A

## 2022-02-23 PROCEDURE — 74011000250 HC RX REV CODE- 250: Performed by: INTERNAL MEDICINE

## 2022-02-23 PROCEDURE — 74011250636 HC RX REV CODE- 250/636: Performed by: NURSE PRACTITIONER

## 2022-02-23 PROCEDURE — 87205 SMEAR GRAM STAIN: CPT

## 2022-02-23 PROCEDURE — 74011000258 HC RX REV CODE- 258: Performed by: INTERNAL MEDICINE

## 2022-02-23 PROCEDURE — 85027 COMPLETE CBC AUTOMATED: CPT

## 2022-02-23 PROCEDURE — 87075 CULTR BACTERIA EXCEPT BLOOD: CPT

## 2022-02-23 PROCEDURE — 87186 SC STD MICRODIL/AGAR DIL: CPT

## 2022-02-23 PROCEDURE — 87077 CULTURE AEROBIC IDENTIFY: CPT

## 2022-02-23 PROCEDURE — 74011250637 HC RX REV CODE- 250/637: Performed by: STUDENT IN AN ORGANIZED HEALTH CARE EDUCATION/TRAINING PROGRAM

## 2022-02-23 PROCEDURE — 99223 1ST HOSP IP/OBS HIGH 75: CPT | Performed by: INTERNAL MEDICINE

## 2022-02-23 PROCEDURE — 36415 COLL VENOUS BLD VENIPUNCTURE: CPT

## 2022-02-23 PROCEDURE — 2709999900 HC NON-CHARGEABLE SUPPLY

## 2022-02-23 RX ORDER — OXYCODONE AND ACETAMINOPHEN 7.5; 325 MG/1; MG/1
1 TABLET ORAL
Status: DISCONTINUED | OUTPATIENT
Start: 2022-02-23 | End: 2022-03-05 | Stop reason: HOSPADM

## 2022-02-23 RX ORDER — MORPHINE SULFATE 2 MG/ML
2 INJECTION, SOLUTION INTRAMUSCULAR; INTRAVENOUS
Status: DISCONTINUED | OUTPATIENT
Start: 2022-02-23 | End: 2022-03-05 | Stop reason: HOSPADM

## 2022-02-23 RX ORDER — FENTANYL CITRATE 50 UG/ML
25 INJECTION, SOLUTION INTRAMUSCULAR; INTRAVENOUS ONCE
Status: COMPLETED | OUTPATIENT
Start: 2022-02-23 | End: 2022-02-23

## 2022-02-23 RX ADMIN — OXYCODONE AND ACETAMINOPHEN 1 TABLET: 7.5; 325 TABLET ORAL at 22:32

## 2022-02-23 RX ADMIN — ACETAMINOPHEN 325MG 650 MG: 325 TABLET ORAL at 18:37

## 2022-02-23 RX ADMIN — SODIUM CHLORIDE, PRESERVATIVE FREE 10 ML: 5 INJECTION INTRAVENOUS at 04:42

## 2022-02-23 RX ADMIN — OXYCODONE AND ACETAMINOPHEN 1 TABLET: 7.5; 325 TABLET ORAL at 16:05

## 2022-02-23 RX ADMIN — PIPERACILLIN AND TAZOBACTAM 3.38 G: 3; .375 INJECTION, POWDER, LYOPHILIZED, FOR SOLUTION INTRAVENOUS at 04:41

## 2022-02-23 RX ADMIN — VANCOMYCIN HYDROCHLORIDE 1000 MG: 1 INJECTION, POWDER, LYOPHILIZED, FOR SOLUTION INTRAVENOUS at 18:24

## 2022-02-23 RX ADMIN — MORPHINE SULFATE 2 MG: 2 INJECTION, SOLUTION INTRAMUSCULAR; INTRAVENOUS at 07:52

## 2022-02-23 RX ADMIN — ACETAMINOPHEN 325MG 650 MG: 325 TABLET ORAL at 11:24

## 2022-02-23 RX ADMIN — PIPERACILLIN AND TAZOBACTAM 3.38 G: 3; .375 INJECTION, POWDER, LYOPHILIZED, FOR SOLUTION INTRAVENOUS at 18:37

## 2022-02-23 RX ADMIN — MORPHINE SULFATE 2 MG: 2 INJECTION, SOLUTION INTRAMUSCULAR; INTRAVENOUS at 21:14

## 2022-02-23 RX ADMIN — FENTANYL CITRATE 25 MCG: 50 INJECTION INTRAMUSCULAR; INTRAVENOUS at 06:39

## 2022-02-23 RX ADMIN — ENOXAPARIN SODIUM 40 MG: 100 INJECTION SUBCUTANEOUS at 08:01

## 2022-02-23 RX ADMIN — MORPHINE SULFATE 1 MG: 2 INJECTION, SOLUTION INTRAMUSCULAR; INTRAVENOUS at 02:23

## 2022-02-23 RX ADMIN — PIPERACILLIN AND TAZOBACTAM 3.38 G: 3; .375 INJECTION, POWDER, LYOPHILIZED, FOR SOLUTION INTRAVENOUS at 10:27

## 2022-02-23 RX ADMIN — MORPHINE SULFATE 2 MG: 2 INJECTION, SOLUTION INTRAMUSCULAR; INTRAVENOUS at 12:48

## 2022-02-23 RX ADMIN — SODIUM CHLORIDE, PRESERVATIVE FREE 10 ML: 5 INJECTION INTRAVENOUS at 21:14

## 2022-02-23 RX ADMIN — SODIUM CHLORIDE, PRESERVATIVE FREE 10 ML: 5 INJECTION INTRAVENOUS at 13:07

## 2022-02-23 RX ADMIN — VANCOMYCIN HYDROCHLORIDE 1000 MG: 1 INJECTION, POWDER, LYOPHILIZED, FOR SOLUTION INTRAVENOUS at 02:23

## 2022-02-23 RX ADMIN — VANCOMYCIN HYDROCHLORIDE 1000 MG: 1 INJECTION, POWDER, LYOPHILIZED, FOR SOLUTION INTRAVENOUS at 10:24

## 2022-02-23 NOTE — PROGRESS NOTES
Transition of Care Plan:    RUR: 19% moderate  Disposition: TBD - Home health vs LTC  Follow up appointments: PCP, Specialists  DME needed: Pt has a wheelchair  Transportation at Discharge: BLS transport  [place] or means to access home: N/A   Medicare Letter: N/A  Is patient a BCPI-A Bundle:   No        If yes, was Bundle Letter given?:    Is patient a  and connected with the [place]? No          If yes, was [employer] transfer form completed and [facility] notified? Caregiver Contact: Memo Mooney ([phone])  Discharge Caregiver contacted prior to discharge? Pt will contact cg prior to d/c  Care Conference needed?: Not indicated at this time    Reason for Readmission:     Sepsis         RUR Score/Risk Level:     19%    PCP: First and Last name:  Pt needs a new PCP appt   Name of Practice:    Are you a current patient: Yes/No:    Approximate date of last visit:    Can you participate in a virtual visit with your PCP:     Is a Care Conference indicated:  Not at this time      Did you attend your follow up appointment (s): If not, why not:  No. Pt reports her did not have transportation. He reports that he called his insurance however they told him that did not have the transportation benefit         Resources/supports as identified by patient/family:   Pt has limited support       Top Challenges facing patient (as identified by patient/family and CM): Finances/Medication cost?    Pt has [employer]      Pt has transportation issues. Pt reports he does not have transportation benefit through his Medicaid? Support system or lack thereof? Pt lacks a support system. Pt mentioned living with his sister but does not know if he can return. Pt has an aunt listed as emergency contact   Living arrangements? Pt reports he thinks he is homeless. Pt doesn't know if he can return to his sister's home at d/c  Self-care/ADLs/Cognition?      Pt is wheelchair bound however is able to perform his own ADL/IADLs. Current Advanced Directive/Advance Care Plan:             Plan for utilizing home health:   Possibly will need SN for IV ABX? Transition of Care Plan:    Based on readmission, the patient's previous Plan of Care   has been evaluated and/or modified. The current Transition of Care Plan is:    TBD - possibly LTC    CM met with patient at the bedside to complete initial assessment. CM introduced role, verified demographics, and discussed discharge planning. Pt is a 22 yo male with an admitting diagnosis of Sepsis. Pt is wheelchair bound however can perform his own ADL/IADLs. Pt reports he does not have an address to give CM. He says that he came from his sisters home, his wheelchair is there, however does not know if he can return there. Pt and CM talked about LTC options. He was interested in CM bringing him a list of SNFs. CM will continue to follow for discharge planning while patient is admitted on current unit. Please contact this CM with questions or issues related to discharge.      Care Management Interventions  PCP Verified by CM: No (Pt needs a new PCP appt)  Mode of Transport at Discharge: BLS  Transition of Care Consult (CM Consult): Discharge Planning  Discharge Durable Medical Equipment: No (Pt has a wheelchair)  Physical Therapy Consult: No  Occupational Therapy Consult: No  Speech Therapy Consult: No  Support Systems: Other Family Member(s)  Confirm Follow Up Transport: Wheelchair [facility]  Discharge Location  Patient Expects to be Discharged to[de-identified] Unable to determine at this time     Readmission Assessment  Number of days since last admission?: 8-30 days  Previous disposition: Home with Home Health  Who is being interviewed?: Patient  What was the patient's/caregiver's perception as to why they think they needed to return back to the hospital?: Could not/did not make appointment with PCP,Not enough help at home  Did you visit your Primary Care Physician after you left the hospital, before you returned this time?: No  Why weren't you able to visit your PCP?: Had no transportation  Did you see a specialist, such as Cardiac, Pulmonary, Orthopedic Physician, etc. after you left the hospital?: No  Who advised the patient to return to the hospital?: [address] [place]  Does the patient report anything that got in the way of taking their medications?: No  In our efforts to provide the best possible care to you and others like you, can you think of anything that we could have done to help you after you left the hospital the first time, so that you might not have needed to return so soon?: Arrange for more help when leaving the hospital,Additional Community resources available for illness support    HIEN Melton  Care Manager [id number] [facility]  [phone]

## 2022-02-23 NOTE — PROGRESS NOTES
Patient is complaining of tooth pain and requesting orajel. He is tolerating morphine for pain, but states that it does not touch his pain. He is scheduled for debridement surgery today of his sacral/ buttocks pressure wounds. Problem: Pressure Injury - Risk of  Goal: *Prevention of pressure injury  Description: Document Dimitri Scale and appropriate interventions in the flowsheet. Outcome: Progressing Towards Goal  Note: Pressure Injury Interventions: Activity Interventions: Assess need for specialty bed    Mobility Interventions: Assess need for specialty bed    Nutrition Interventions: Document food/fluid/supplement intake    Friction and Shear Interventions: Apply protective barrier, creams and emollients,Foam dressings/transparent film/skin sealants,HOB 30 degrees or less,Lift sheet,Lift team/patient mobility team,Minimize layers                Problem: Patient Education: Go to Patient Education Activity  Goal: Patient/Family Education  Outcome: Progressing Towards Goal     Problem: Falls - Risk of  Goal: *Absence of Falls  Description: Document Joan Litter Fall Risk and appropriate interventions in the flowsheet.   Outcome: Progressing Towards Goal  Note: Fall Risk Interventions:            Medication Interventions: Assess postural VS orthostatic hypotension    Elimination Interventions: Call light in reach              Problem: Patient Education: Go to Patient Education Activity  Goal: Patient/Family Education  Outcome: Progressing Towards Goal

## 2022-02-23 NOTE — PROGRESS NOTES
Anesthesiology    OK to proceed with scheduled surgical procedure, assuming no acute changes in overnight clinical status or lab derangements and patient remains NPO.

## 2022-02-23 NOTE — PROGRESS NOTES
2115: Assumed care of patient at this time, wiating on offgoing RN to give report. Patient uncommfortable, and in pain. Patient requesting pain medication and straight cath. Patient states self straight cath at home. Will notify MD Yanely.     2118: Pranay Houston MD paged of above. 2133: Patient requested for pain medication and food. Informed patient still waiting on MD to call and will see if boxed lunch available. 2143Xiomy Leos MD paged of above. 2147: Dr. Pranay Houston stated will place orders for oxycodone and straight cath. 46: TRANSFER - OUT REPORT:    Verbal report given to Blanca Keyes RN (name) on [facility]  being transferred to [facility] - [facility]184 (unit) for routine progression of care       Report consisted of patients Situation, Background, Assessment and   Recommendations(SBAR). Information from the following report(s) SBAR, ED Summary, Intake/Output, MAR, Accordion, Recent Results, Med Rec Status and Cardiac Rhythm ST was reviewed with the receiving nurse. Lines:   PICC Single Lumen 31/35/47 Right;Basilic (Active)       Peripheral IV 02/22/22 Distal;Left Antecubital (Active)   Site Assessment Clean, dry, & intact 02/23/22 0015   Phlebitis Assessment 0 02/23/22 0015   Infiltration Assessment 0 02/23/22 0015   Dressing Status Clean, dry, & intact 02/23/22 0015   Hub Color/Line Status Pink;Flushed;Patent 02/23/22 0015   Alcohol Cap Used Yes 02/23/22 0015       Peripheral IV 02/22/22 Anterior;Proximal;Right Antecubital (Active)   Site Assessment Clean, dry, & intact 02/23/22 0015   Phlebitis Assessment 0 02/23/22 0015   Infiltration Assessment 0 02/23/22 0015   Dressing Status Clean, dry, & intact 02/23/22 0015   Dressing Type Transparent 02/23/22 0015   Hub Color/Line Status Pink;Flushed;Patent 02/23/22 0015   Alcohol Cap Used Yes 02/23/22 0015        Opportunity for questions and clarification was provided. Patient transported with:   Registered Nurse     7944: Patient in room 2184.
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
## 2022-02-23 NOTE — PROGRESS NOTES
Received message from patient's nurse in regards to patient complaining of 10/10 pain stating that morphine has not been effective. Discussion / orders:    Patient currently on morphine 1 mg iv q6h prn    Fentanyl 25 mcg iv x 1   Increase morphine to 2 mg iv q4h prn           Please note that this note was dictated using Dragon computer voice recognition software. Quite often unanticipated grammatical, syntax, homophones, and other interpretive errors are inadvertently transcribed by the computer software. Please disregard these errors. Please excuse any errors that have escaped final proofreading.      Signed by:  Caleb Hutton DNP, ACNP-BC
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
## 2022-02-23 NOTE — PROGRESS NOTES
Pharmacy Automatic Renal Dosing Protocol - Antimicrobials    Indication for Antimicrobials: Bone and Joint Infection    Current Regimen of Each Antimicrobial:  Vancomycin 1000 mg Q8H  (Start Date ; Day #  )    Previous Antimicrobial Therapy:    Vancomycin Goal Level: 400-600 mg*hour/Liter per 24 hours    Vancomycin Levels  Date Dose & Interval Measured (mcg/mL) Steady State (mcg/mL)                       Date & time of next level:     Significant Cultures:     Radiology / Imaging results: (X-ray, CT scan or MRI):       Labs:  Recent Labs     22  1614   CREA 0.42*   BUN 5*   WBC 6.9     Temp (24hrs), Av.3 °F (37.9 °C), Min:99.6 °F (37.6 °C), Max:100.9 °F (38.3 °C)      Paralysis, amputations, malnutrition: Paraplegia, bilateral amputations   Creatinine Clearance (mL/min) or Dialysis: 124    Impression/Plan:   Antibiotics as above   Vancomycin 1 grams Q8H previous admission gave therapeutic levels. [facility] daily      Pharmacy will follow daily and adjust medications as appropriate for renal function and/or serum levels. Thank you,  Ernst Duane, [address] [facility]      Recommended duration of therapy  [email or web address]/[email or web address]. [email or web address]    Renal Dosing  [email or web address]/Pharmacy/[email or web address]/Renal%[email or web address]. pdf
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
## 2022-02-23 NOTE — PROGRESS NOTES
Physician Progress Note      Minna Banda  CSN #:                  [id number]  :                       1995  ADMIT DATE:       2022 3:25 PM  [address] [place] DATE:  RESPONDING  PROVIDER #:        Samir Brown MD          QUERY TEXT:    Patient admitted with decubitus ulcer infection and septic arthritis of the right hip. Per RD consult note dated , patient noted to have Severe body fat  and muscle mass loss. If possible, please document in progress notes and discharge summary if you are evaluating and /or treating any of the following: The medical record reflects the following:  Risk Factors: Hx: Paraplegia; UTI; Bacteremia; Sacral decubitus; BMI: 16.27  Clinical Indicators: RD noted patient meets the following ASPEN criteria:  Malnutrition Assessment:  Malnutrition Status:  Severe malnutrition  Context:  Chronic illness  Findings of the 6 clinical characteristics of malnutrition:  Energy Intake:  Unable to assess  Weight Loss:  Unable to assess  Body Fat Loss:  7 - Severe body fat loss, Triceps,Orbital,Fat overlying ribs,Buccal region  Muscle Mass Loss:  7 - Severe muscle mass loss, Scapula (trapezius),Temples (temporalis),Thigh (quadriceps),Clavicles (pectoralis &deltoids)  Fluid Accumulation:  Unable to assess,   Strength:  Not performed  . ...... Trula Blew Noted: Wounds:  Stage IV  Treatment: RD consult; GS consult; Ortho consult; Nutrition Recommendations/Plan: Continue regular diet for optimal intake; RD ordered Ensue Enlive po TID with meals which will provide an additional 60 g protein daily; Please document % meals and supplements consumed in flowsheet I/O's under intake. Thank you,    Lila Mc  [facility]    ASPEN Criteria:  https://aspenjournals. onlinelibrary. jamison. com/doi/full/10.1177/4009845427273386  Options provided:  -- Protein calorie malnutrition severe  -- Other - I will add my own diagnosis  -- Disagree - Not applicable / Not valid  -- Disagree - Clinically unable to determine / Unknown  -- Refer to Clinical Documentation Reviewer    PROVIDER RESPONSE TEXT:    This patient has severe protein calorie malnutrition. Query created by: Tiffanie Vivar on 2/23/2022 5:33 PM      QUERY TEXT:    Patient admitted with decubitus ulcer infection and septic arthritis of the right hip. Per Wound Care RN note dated 2/23, patient noted to have Stage 4 PI to sacrum and left ischial. If possible, please document in progress notes and discharge summary the stage of the pressure ulcer: The medical record reflects the following:  Risk Factors: hx: paraplegic; UTI, bacteremia, smoker  Clinical Indicators: Per Wound care rn: Patient also has Stage 4 PI to sacrum and left ischial... Birgit Red Birgit Red Noted per ED: Over his buttocks is got a very large grade 3-4 wound. Then there is another wound that is about grade 3 and then another wound on the left side that is grade 3 as well. No obvious drainage. .. CT ABD/PELVIS: Septic arthritis of the right hip; Chronic decubitus ulcers with chronic osteomyelitis of the bilateral ischium    Treatment: CT ABD/PELVIS: IV Abx; Wound care consult; GS consult;  Ortho consult; BCx; Wound Cx; ID consult    Thank you,    Jacquelin Baig  [facility]      Stage 1:  Non-blanchable erythema of intact skin  Stage 2:  Abrasion, Blister, Partial-thickness skin loss, with exposed dermis  Stage 3:  Full-thickness skin loss with damage or necrosis of subcutaneous tissue  Stage 4:  Full-thickness skin & soft tissue loss through to underlying muscle, tendon or bone  Unstageable: Obscured full-thickness skin & tissue loss  Options provided:  -- Stage 3 Pressure Ulcer of *** (location) present on admission  -- Stage 4 Pressure Ulcer of *** (location) present on admission  -- Other - I will add my own diagnosis  -- Disagree - Not applicable / Not valid  -- Disagree - Clinically unable to determine / Unknown  -- Refer to Clinical Documentation Reviewer    PROVIDER RESPONSE TEXT:    This patient has a stage 4 pressure ulcer of the *** (location) which was present on admission.     Query created by: Oumou Obando on 2/23/2022 5:48 PM      Electronically signed by:  Gardenia Winn MD 2/23/2022 5:56 PM

## 2022-02-23 NOTE — PROGRESS NOTES
Hospitalist Progress Note    NAME: Cabrera Gonzalez   :  1995   MRN:  [id number]       Assessment / Plan:    Sepsis secondary to decubitus ulcer infection and septic arthritis of the right hip  -Admit patient to telemetry  -Start patient on broad-spectrum antibiotic  -Wound care consultation  -Blood cultures so far negative. -Urine culture pending.  -Wound culture pending.  -Infectious disease consulted. -Orthopedics consulted-appreciate recommendations.  -surgery consult -awaiting recommendations.  -Last admission 3 weeks ago secondary to bacteremia and UTI along with decubitus ulcer.     Paraplegia  -Patient self catheterize himself    less than 18.5 Underweight / Body mass index is 16.27 kg/m². Estimated discharge date:   Barriers: Clinical improvement    Code status: Full  Prophylaxis: Lovenox  Recommended Disposition:  PT, OT, RN     Subjective:     Chief Complaint / Reason for Physician Visit  Patient seen today, complaining of the pain in the buttocks area. Discussed with RN events overnight. Review of Systems:  Symptom Y/N Comments  Symptom Y/N Comments   Fever/Chills    Chest Pain     Poor Appetite    Edema     Cough    Abdominal Pain     Sputum    Joint Pain     SOB/BILLINGS    Pruritis/Rash     Nausea/vomit    Tolerating PT/OT     Diarrhea    Tolerating Diet     Constipation    Other       Could NOT obtain due to:      Objective:     VITALS:   Last 24hrs VS reviewed since prior progress note.  Most recent are:  Patient Vitals for the past 24 hrs:   Temp Pulse Resp BP SpO2   22 1143 98.8 °F (37.1 °C) 95 16 116/67 100 %   22 0844 99.1 °F (37.3 °C) 97 18 (!) 120/58 94 %   22 0609 98.2 °F (36.8 °C) (!) 104 18 134/70 100 %   22 0400 99.2 °F (37.3 °C) 95 17 130/62 100 %   22 0015 98.4 °F (36.9 °C) 91 15 138/75 100 %   22 2330 -- (!) 103 12 135/74 99 %   22 2116 98.6 °F (37 °C) (!) 109 15 (!) 144/67 99 %   22 1948 -- (!) 105 12 -- 97 % 02/22/22 1938 -- 100 18 -- 98 %   02/22/22 1928 -- 98 18 -- 100 %   02/22/22 1918 -- (!) 111 19 -- 99 %   02/22/22 1908 -- (!) 106 19 -- 98 %   02/22/22 1858 -- (!) 103 16 -- 98 %   02/22/22 1848 -- (!) 101 17 117/66 100 %   02/22/22 1745 99.6 °F (37.6 °C) (!) 114 15 -- 99 %   02/22/22 1540 (!) 100.9 °F (38.3 °C) (!) 118 24 105/63 94 %       Intake/Output Summary (Last 24 hours) at 2/23/2022 1329  Last data filed at 2/23/2022 0015  Gross per 24 hour   Intake 1450 ml   Output 780 ml   Net 670 ml        I had a face to face encounter and independently examined this patient on 2/23/2022, as outlined below:  PHYSICAL EXAM:  General: WD, WN. Alert, cooperative, no acute distress    EENT:  EOMI. Anicteric sclerae. MMM  Resp:  CTA bilaterally, no wheezing or rales. No accessory muscle use  CV:  Regular  rhythm,  No edema  GI:  Soft, Non distended, Non tender. +Bowel sounds  Neurologic:  Alert and oriented X 3, normal speech,   Psych:   Good insight. Not anxious nor agitated  Skin:  No rashes. No jaundice, stage IV sacral decubitus ulcer    Reviewed most current lab test results and cultures  YES  Reviewed most current radiology test results   YES  Review and summation of old records today    NO  Reviewed patient's current orders and MAR    YES  PMH/SH reviewed - no change compared to H&P  ________________________________________________________________________  Care Plan discussed with:    Comments   Patient x    Family      RN x    Care Manager     Consultant                       x Multidiciplinary team rounds were held today with , nursing, pharmacist and clinical coordinator. Patient's plan of care was discussed; medications were reviewed and discharge planning was addressed.      ________________________________________________________________________  Total NON critical care TIME: 35 Minutes    Total CRITICAL CARE TIME Spent:   Minutes non procedure based      Comments   >50% of visit spent in counseling and coordination of care     ________________________________________________________________________  Machelle Gallegos MD     Procedures: see electronic medical records for all procedures/Xrays and details which were not copied into this note but were reviewed prior to creation of Plan. LABS:  I reviewed today's most current labs and imaging studies.   Pertinent labs include:  Recent Labs     02/23/22  0143 02/22/22  1614   WBC 6.4 6.9   HGB 8.0* 8.5*   HCT 28.5* 30.2*   * 709*     Recent Labs     02/23/22  0143 02/22/22  1614    134*   K 3.8 3.5    99   CO2 28 29   * 85   BUN 5* 5*   CREA 0.31* 0.42*   CA 8.3* 8.5   ALB  --  2.0*   TBILI  --  0.2   ALT  --  9*       Signed: Machelle Gallegos MD

## 2022-02-24 ENCOUNTER — APPOINTMENT (OUTPATIENT)
Dept: GENERAL RADIOLOGY | Age: 27
DRG: 710 | End: 2022-02-24
Attending: NURSE PRACTITIONER
Payer: MEDICAID

## 2022-02-24 ENCOUNTER — ANESTHESIA (OUTPATIENT)
Dept: SURGERY | Age: 27
DRG: 710 | End: 2022-02-24
Payer: MEDICAID

## 2022-02-24 LAB
ANION GAP SERPL CALC-SCNC: 3 MMOL/L (ref 5–15)
BACTERIA SPEC CULT: NORMAL
BASOPHILS # BLD: 0 K/UL (ref 0–0.1)
BASOPHILS NFR BLD: 0 % (ref 0–1)
BNP SERPL-MCNC: 129 PG/ML
BUN SERPL-MCNC: 3 MG/DL (ref 6–20)
BUN/CREAT SERPL: 9 (ref 12–20)
CALCIUM SERPL-MCNC: 8.3 MG/DL (ref 8.5–10.1)
CHLORIDE SERPL-SCNC: 108 MMOL/L (ref 97–108)
CO2 SERPL-SCNC: 26 MMOL/L (ref 21–32)
CREAT SERPL-MCNC: 0.33 MG/DL (ref 0.7–1.3)
DIFFERENTIAL METHOD BLD: ABNORMAL
EOSINOPHIL # BLD: 0.2 K/UL (ref 0–0.4)
EOSINOPHIL NFR BLD: 4 % (ref 0–7)
ERYTHROCYTE [DISTWIDTH] IN BLOOD BY AUTOMATED COUNT: 18.3 % (ref 11.5–14.5)
GLUCOSE SERPL-MCNC: 85 MG/DL (ref 65–100)
HCT VFR BLD AUTO: 32.2 % (ref 36.6–50.3)
HGB BLD-MCNC: 8.9 G/DL (ref 12.1–17)
IMM GRANULOCYTES # BLD AUTO: 0 K/UL (ref 0–0.04)
IMM GRANULOCYTES NFR BLD AUTO: 0 % (ref 0–0.5)
LYMPHOCYTES # BLD: 1.7 K/UL (ref 0.8–3.5)
LYMPHOCYTES NFR BLD: 37 % (ref 12–49)
MCH RBC QN AUTO: 24.2 PG (ref 26–34)
MCHC RBC AUTO-ENTMCNC: 27.6 G/DL (ref 30–36.5)
MCV RBC AUTO: 87.5 FL (ref 80–99)
MONOCYTES # BLD: 0.4 K/UL (ref 0–1)
MONOCYTES NFR BLD: 9 % (ref 5–13)
NEUTS SEG # BLD: 2.3 K/UL (ref 1.8–8)
NEUTS SEG NFR BLD: 50 % (ref 32–75)
NRBC # BLD: 0 K/UL (ref 0–0.01)
NRBC BLD-RTO: 0 PER 100 WBC
PLATELET # BLD AUTO: 661 K/UL (ref 150–400)
PMV BLD AUTO: 8.5 FL (ref 8.9–12.9)
POTASSIUM SERPL-SCNC: 4.4 MMOL/L (ref 3.5–5.1)
RBC # BLD AUTO: 3.68 M/UL (ref 4.1–5.7)
SERVICE CMNT-IMP: NORMAL
SODIUM SERPL-SCNC: 137 MMOL/L (ref 136–145)
TROPONIN-HIGH SENSITIVITY: 5 NG/L (ref 0–76)
VANCOMYCIN SERPL-MCNC: 9.2 UG/ML
WBC # BLD AUTO: 4.7 K/UL (ref 4.1–11.1)

## 2022-02-24 PROCEDURE — 74011000250 HC RX REV CODE- 250: Performed by: NURSE ANESTHETIST, CERTIFIED REGISTERED

## 2022-02-24 PROCEDURE — 76060000033 HC ANESTHESIA 1 TO 1.5 HR: Performed by: ORTHOPAEDIC SURGERY

## 2022-02-24 PROCEDURE — 87075 CULTR BACTERIA EXCEPT BLOOD: CPT

## 2022-02-24 PROCEDURE — 87205 SMEAR GRAM STAIN: CPT

## 2022-02-24 PROCEDURE — 87077 CULTURE AEROBIC IDENTIFY: CPT

## 2022-02-24 PROCEDURE — 74011250636 HC RX REV CODE- 250/636: Performed by: INTERNAL MEDICINE

## 2022-02-24 PROCEDURE — 77030002966 HC SUT PDS J&J -A: Performed by: ORTHOPAEDIC SURGERY

## 2022-02-24 PROCEDURE — 36415 COLL VENOUS BLD VENIPUNCTURE: CPT

## 2022-02-24 PROCEDURE — 74011250636 HC RX REV CODE- 250/636: Performed by: NURSE PRACTITIONER

## 2022-02-24 PROCEDURE — 77030010509 HC AIRWY LMA MSK TELE -A: Performed by: NURSE ANESTHETIST, CERTIFIED REGISTERED

## 2022-02-24 PROCEDURE — 71045 X-RAY EXAM CHEST 1 VIEW: CPT

## 2022-02-24 PROCEDURE — 74011000258 HC RX REV CODE- 258: Performed by: NURSE ANESTHETIST, CERTIFIED REGISTERED

## 2022-02-24 PROCEDURE — 77030002916 HC SUT ETHLN J&J -A: Performed by: ORTHOPAEDIC SURGERY

## 2022-02-24 PROCEDURE — 0QB20ZZ EXCISION OF RIGHT PELVIC BONE, OPEN APPROACH: ICD-10-PCS | Performed by: ORTHOPAEDIC SURGERY

## 2022-02-24 PROCEDURE — 76210000006 HC OR PH I REC 0.5 TO 1 HR: Performed by: ORTHOPAEDIC SURGERY

## 2022-02-24 PROCEDURE — 77030008462 HC STPLR SKN PROX J&J -A: Performed by: ORTHOPAEDIC SURGERY

## 2022-02-24 PROCEDURE — 84484 ASSAY OF TROPONIN QUANT: CPT

## 2022-02-24 PROCEDURE — 80048 BASIC METABOLIC PNL TOTAL CA: CPT

## 2022-02-24 PROCEDURE — 77030012406 HC DRN WND PENRS BARD -A: Performed by: ORTHOPAEDIC SURGERY

## 2022-02-24 PROCEDURE — 77030038692 HC WND DEB SYS IRMX -B: Performed by: ORTHOPAEDIC SURGERY

## 2022-02-24 PROCEDURE — 85025 COMPLETE CBC W/AUTO DIFF WBC: CPT

## 2022-02-24 PROCEDURE — 76010000149 HC OR TIME 1 TO 1.5 HR: Performed by: ORTHOPAEDIC SURGERY

## 2022-02-24 PROCEDURE — 74011000250 HC RX REV CODE- 250: Performed by: INTERNAL MEDICINE

## 2022-02-24 PROCEDURE — 74011250637 HC RX REV CODE- 250/637: Performed by: STUDENT IN AN ORGANIZED HEALTH CARE EDUCATION/TRAINING PROGRAM

## 2022-02-24 PROCEDURE — 77030026438 HC STYL ET INTUB CARD -A: Performed by: NURSE ANESTHETIST, CERTIFIED REGISTERED

## 2022-02-24 PROCEDURE — 99233 SBSQ HOSP IP/OBS HIGH 50: CPT | Performed by: INTERNAL MEDICINE

## 2022-02-24 PROCEDURE — 74011250636 HC RX REV CODE- 250/636: Performed by: ANESTHESIOLOGY

## 2022-02-24 PROCEDURE — C1713 ANCHOR/SCREW BN/BN,TIS/BN: HCPCS | Performed by: ORTHOPAEDIC SURGERY

## 2022-02-24 PROCEDURE — 87186 SC STD MICRODIL/AGAR DIL: CPT

## 2022-02-24 PROCEDURE — 74011250636 HC RX REV CODE- 250/636: Performed by: NURSE ANESTHETIST, CERTIFIED REGISTERED

## 2022-02-24 PROCEDURE — 77030002933 HC SUT MCRYL J&J -A: Performed by: ORTHOPAEDIC SURGERY

## 2022-02-24 PROCEDURE — 74011250637 HC RX REV CODE- 250/637: Performed by: ANESTHESIOLOGY

## 2022-02-24 PROCEDURE — 74011250636 HC RX REV CODE- 250/636: Performed by: ORTHOPAEDIC SURGERY

## 2022-02-24 PROCEDURE — 74011000258 HC RX REV CODE- 258: Performed by: INTERNAL MEDICINE

## 2022-02-24 PROCEDURE — 83880 ASSAY OF NATRIURETIC PEPTIDE: CPT

## 2022-02-24 PROCEDURE — 74011250637 HC RX REV CODE- 250/637: Performed by: EMERGENCY MEDICINE

## 2022-02-24 PROCEDURE — 65660000000 HC RM CCU STEPDOWN

## 2022-02-24 PROCEDURE — 74011000250 HC RX REV CODE- 250: Performed by: ORTHOPAEDIC SURGERY

## 2022-02-24 PROCEDURE — 77030018673: Performed by: ORTHOPAEDIC SURGERY

## 2022-02-24 PROCEDURE — 80202 ASSAY OF VANCOMYCIN: CPT

## 2022-02-24 PROCEDURE — 74011000250 HC RX REV CODE- 250: Performed by: SURGERY

## 2022-02-24 PROCEDURE — 2709999900 HC NON-CHARGEABLE SUPPLY: Performed by: ORTHOPAEDIC SURGERY

## 2022-02-24 DEVICE — STIMULAN® RAPID CURE PROVIDED STERILE FOR SINGLE PATIENT USE. STIMULAN® RAPID CURE CONTAINS CALCIUM SULFATE POWDER AND MIXING SOLUTION IN PRE-MEASURED QUANTITIES SO THAT WHEN MIXED TOGETHER IN A STERILE MIXING BOWL, THE RESULTANT PASTE IS TO BE DIGITALLY PACKED INTO OPEN BONE VOID/GAP TO SET INSITU OR PLACED INTO THE MOULD PROVIDED, THE MIXTURE SETS TO FORM BEADS. THE BIODEGRADABLE, RADIOPAQUE BEADS ARE RESORBED IN APPROXIMATELY 30 – 60 DAYS WHEN USED IN ACCORDANCE WITH THE DEVICE LABELLING. STIMULAN® RAPID CURE IS MANUFACTURED FROM SYNTHETIC IMPLANT GRADE CALCIUM SULFATE DIHYDRATE(CASO4.2H2O) THAT RESORBS AND IS REPLACED WITH BONE DURING THE HEALING PROCESS. ALSO, AS THE BONE VOID FILLER BEADS ARE BIODEGRADABLE AND BIOCOMPATIBLE, THEY MAY BE USED AT AN INFECTED SITE.
Type: IMPLANTABLE DEVICE | Site: HIP | Status: FUNCTIONAL
Brand: STIMULAN® RAPID CURE

## 2022-02-24 RX ORDER — SODIUM CHLORIDE, SODIUM LACTATE, POTASSIUM CHLORIDE, CALCIUM CHLORIDE 600; 310; 30; 20 MG/100ML; MG/100ML; MG/100ML; MG/100ML
100 INJECTION, SOLUTION INTRAVENOUS CONTINUOUS
Status: DISCONTINUED | OUTPATIENT
Start: 2022-02-24 | End: 2022-02-24 | Stop reason: HOSPADM

## 2022-02-24 RX ORDER — MORPHINE SULFATE 2 MG/ML
2 INJECTION, SOLUTION INTRAMUSCULAR; INTRAVENOUS
Status: DISCONTINUED | OUTPATIENT
Start: 2022-02-24 | End: 2022-02-24 | Stop reason: HOSPADM

## 2022-02-24 RX ORDER — VANCOMYCIN HYDROCHLORIDE 1 G/20ML
INJECTION, POWDER, LYOPHILIZED, FOR SOLUTION INTRAVENOUS AS NEEDED
Status: DISCONTINUED | OUTPATIENT
Start: 2022-02-24 | End: 2022-02-24 | Stop reason: HOSPADM

## 2022-02-24 RX ORDER — ONDANSETRON 2 MG/ML
INJECTION INTRAMUSCULAR; INTRAVENOUS AS NEEDED
Status: DISCONTINUED | OUTPATIENT
Start: 2022-02-24 | End: 2022-02-24 | Stop reason: HOSPADM

## 2022-02-24 RX ORDER — PHENYLEPHRINE HCL IN 0.9% NACL 0.4MG/10ML
SYRINGE (ML) INTRAVENOUS AS NEEDED
Status: DISCONTINUED | OUTPATIENT
Start: 2022-02-24 | End: 2022-02-24 | Stop reason: HOSPADM

## 2022-02-24 RX ORDER — HYDROMORPHONE HYDROCHLORIDE 1 MG/ML
0.5 INJECTION, SOLUTION INTRAMUSCULAR; INTRAVENOUS; SUBCUTANEOUS
Status: DISCONTINUED | OUTPATIENT
Start: 2022-02-24 | End: 2022-02-24 | Stop reason: HOSPADM

## 2022-02-24 RX ORDER — FENTANYL CITRATE 50 UG/ML
50 INJECTION, SOLUTION INTRAMUSCULAR; INTRAVENOUS AS NEEDED
Status: DISCONTINUED | OUTPATIENT
Start: 2022-02-24 | End: 2022-02-24 | Stop reason: HOSPADM

## 2022-02-24 RX ORDER — MIDAZOLAM HYDROCHLORIDE 1 MG/ML
1 INJECTION, SOLUTION INTRAMUSCULAR; INTRAVENOUS AS NEEDED
Status: DISCONTINUED | OUTPATIENT
Start: 2022-02-24 | End: 2022-02-24 | Stop reason: HOSPADM

## 2022-02-24 RX ORDER — SODIUM CHLORIDE 0.9 % (FLUSH) 0.9 %
5-40 SYRINGE (ML) INJECTION EVERY 8 HOURS
Status: DISCONTINUED | OUTPATIENT
Start: 2022-02-24 | End: 2022-02-24 | Stop reason: HOSPADM

## 2022-02-24 RX ORDER — ONDANSETRON 2 MG/ML
4 INJECTION INTRAMUSCULAR; INTRAVENOUS AS NEEDED
Status: DISCONTINUED | OUTPATIENT
Start: 2022-02-24 | End: 2022-02-24 | Stop reason: HOSPADM

## 2022-02-24 RX ORDER — MIDAZOLAM HYDROCHLORIDE 1 MG/ML
0.5 INJECTION, SOLUTION INTRAMUSCULAR; INTRAVENOUS
Status: DISCONTINUED | OUTPATIENT
Start: 2022-02-24 | End: 2022-02-24 | Stop reason: HOSPADM

## 2022-02-24 RX ORDER — VANCOMYCIN HYDROCHLORIDE
1250 EVERY 8 HOURS
Status: DISCONTINUED | OUTPATIENT
Start: 2022-02-24 | End: 2022-03-02

## 2022-02-24 RX ORDER — POVIDONE-IODINE 10 %
SOLUTION, NON-ORAL TOPICAL AS NEEDED
Status: DISCONTINUED | OUTPATIENT
Start: 2022-02-24 | End: 2022-02-24 | Stop reason: HOSPADM

## 2022-02-24 RX ORDER — FENTANYL CITRATE 50 UG/ML
25 INJECTION, SOLUTION INTRAMUSCULAR; INTRAVENOUS
Status: COMPLETED | OUTPATIENT
Start: 2022-02-24 | End: 2022-02-24

## 2022-02-24 RX ORDER — PROPOFOL 10 MG/ML
INJECTION, EMULSION INTRAVENOUS AS NEEDED
Status: DISCONTINUED | OUTPATIENT
Start: 2022-02-24 | End: 2022-02-24 | Stop reason: HOSPADM

## 2022-02-24 RX ORDER — ACETAMINOPHEN 325 MG/1
650 TABLET ORAL ONCE
Status: COMPLETED | OUTPATIENT
Start: 2022-02-24 | End: 2022-02-24

## 2022-02-24 RX ORDER — DIPHENHYDRAMINE HYDROCHLORIDE 50 MG/ML
12.5 INJECTION, SOLUTION INTRAMUSCULAR; INTRAVENOUS AS NEEDED
Status: DISCONTINUED | OUTPATIENT
Start: 2022-02-24 | End: 2022-02-24 | Stop reason: HOSPADM

## 2022-02-24 RX ORDER — SODIUM CHLORIDE 0.9 % (FLUSH) 0.9 %
5-40 SYRINGE (ML) INJECTION AS NEEDED
Status: DISCONTINUED | OUTPATIENT
Start: 2022-02-24 | End: 2022-02-24 | Stop reason: HOSPADM

## 2022-02-24 RX ORDER — MIDAZOLAM HYDROCHLORIDE 1 MG/ML
INJECTION, SOLUTION INTRAMUSCULAR; INTRAVENOUS AS NEEDED
Status: DISCONTINUED | OUTPATIENT
Start: 2022-02-24 | End: 2022-02-24 | Stop reason: HOSPADM

## 2022-02-24 RX ORDER — HYDROMORPHONE HYDROCHLORIDE 1 MG/ML
INJECTION, SOLUTION INTRAMUSCULAR; INTRAVENOUS; SUBCUTANEOUS
Status: DISPENSED
Start: 2022-02-24 | End: 2022-02-25

## 2022-02-24 RX ORDER — ROPIVACAINE HYDROCHLORIDE 5 MG/ML
30 INJECTION, SOLUTION EPIDURAL; INFILTRATION; PERINEURAL AS NEEDED
Status: DISCONTINUED | OUTPATIENT
Start: 2022-02-24 | End: 2022-02-24 | Stop reason: HOSPADM

## 2022-02-24 RX ORDER — HYDROMORPHONE HYDROCHLORIDE 2 MG/ML
INJECTION, SOLUTION INTRAMUSCULAR; INTRAVENOUS; SUBCUTANEOUS AS NEEDED
Status: DISCONTINUED | OUTPATIENT
Start: 2022-02-24 | End: 2022-02-24 | Stop reason: HOSPADM

## 2022-02-24 RX ORDER — LIDOCAINE HYDROCHLORIDE 10 MG/ML
0.1 INJECTION, SOLUTION EPIDURAL; INFILTRATION; INTRACAUDAL; PERINEURAL AS NEEDED
Status: DISCONTINUED | OUTPATIENT
Start: 2022-02-24 | End: 2022-02-24 | Stop reason: HOSPADM

## 2022-02-24 RX ORDER — LIDOCAINE HYDROCHLORIDE 20 MG/ML
INJECTION, SOLUTION EPIDURAL; INFILTRATION; INTRACAUDAL; PERINEURAL AS NEEDED
Status: DISCONTINUED | OUTPATIENT
Start: 2022-02-24 | End: 2022-02-24 | Stop reason: HOSPADM

## 2022-02-24 RX ORDER — FENTANYL CITRATE 50 UG/ML
INJECTION, SOLUTION INTRAMUSCULAR; INTRAVENOUS AS NEEDED
Status: DISCONTINUED | OUTPATIENT
Start: 2022-02-24 | End: 2022-02-24 | Stop reason: HOSPADM

## 2022-02-24 RX ORDER — SODIUM CHLORIDE 9 MG/ML
25 INJECTION, SOLUTION INTRAVENOUS CONTINUOUS
Status: DISCONTINUED | OUTPATIENT
Start: 2022-02-24 | End: 2022-02-24 | Stop reason: HOSPADM

## 2022-02-24 RX ADMIN — ONDANSETRON HYDROCHLORIDE 4 MG: 2 INJECTION, SOLUTION INTRAMUSCULAR; INTRAVENOUS at 12:24

## 2022-02-24 RX ADMIN — FENTANYL CITRATE 25 MCG: 50 INJECTION, SOLUTION INTRAMUSCULAR; INTRAVENOUS at 12:24

## 2022-02-24 RX ADMIN — PIPERACILLIN AND TAZOBACTAM 3.38 G: 3; .375 INJECTION, POWDER, LYOPHILIZED, FOR SOLUTION INTRAVENOUS at 03:34

## 2022-02-24 RX ADMIN — FENTANYL CITRATE 25 MCG: 50 INJECTION, SOLUTION INTRAMUSCULAR; INTRAVENOUS at 13:33

## 2022-02-24 RX ADMIN — VANCOMYCIN HYDROCHLORIDE 1000 MG: 1 INJECTION, POWDER, LYOPHILIZED, FOR SOLUTION INTRAVENOUS at 03:34

## 2022-02-24 RX ADMIN — MIDAZOLAM HYDROCHLORIDE 2 MG: 1 INJECTION, SOLUTION INTRAMUSCULAR; INTRAVENOUS at 12:04

## 2022-02-24 RX ADMIN — PROPOFOL 150 MG: 10 INJECTION, EMULSION INTRAVENOUS at 12:10

## 2022-02-24 RX ADMIN — HYDROMORPHONE HYDROCHLORIDE 0.5 MG: 2 INJECTION, SOLUTION INTRAMUSCULAR; INTRAVENOUS; SUBCUTANEOUS at 13:08

## 2022-02-24 RX ADMIN — FENTANYL CITRATE 25 MCG: 50 INJECTION, SOLUTION INTRAMUSCULAR; INTRAVENOUS at 13:43

## 2022-02-24 RX ADMIN — PIPERACILLIN AND TAZOBACTAM 3.38 G: 3; .375 INJECTION, POWDER, LYOPHILIZED, FOR SOLUTION INTRAVENOUS at 11:30

## 2022-02-24 RX ADMIN — COLLAGENASE SANTYL: 250 OINTMENT TOPICAL at 14:13

## 2022-02-24 RX ADMIN — OXYCODONE AND ACETAMINOPHEN 1 TABLET: 7.5; 325 TABLET ORAL at 14:11

## 2022-02-24 RX ADMIN — FENTANYL CITRATE 25 MCG: 50 INJECTION, SOLUTION INTRAMUSCULAR; INTRAVENOUS at 13:28

## 2022-02-24 RX ADMIN — ACETAMINOPHEN 650 MG: 325 TABLET ORAL at 12:00

## 2022-02-24 RX ADMIN — VANCOMYCIN HYDROCHLORIDE 1000 MG: 1 INJECTION, POWDER, LYOPHILIZED, FOR SOLUTION INTRAVENOUS at 10:10

## 2022-02-24 RX ADMIN — SODIUM CHLORIDE, PRESERVATIVE FREE 10 ML: 5 INJECTION INTRAVENOUS at 08:53

## 2022-02-24 RX ADMIN — OXYCODONE AND ACETAMINOPHEN 1 TABLET: 7.5; 325 TABLET ORAL at 08:52

## 2022-02-24 RX ADMIN — Medication 80 MCG: at 12:25

## 2022-02-24 RX ADMIN — Medication 80 MCG: at 12:19

## 2022-02-24 RX ADMIN — OXYCODONE AND ACETAMINOPHEN 1 TABLET: 7.5; 325 TABLET ORAL at 04:52

## 2022-02-24 RX ADMIN — LIDOCAINE HYDROCHLORIDE 80 MG: 20 INJECTION, SOLUTION EPIDURAL; INFILTRATION; INTRACAUDAL; PERINEURAL at 12:10

## 2022-02-24 RX ADMIN — FENTANYL CITRATE 25 MCG: 50 INJECTION, SOLUTION INTRAMUSCULAR; INTRAVENOUS at 13:38

## 2022-02-24 RX ADMIN — FENTANYL CITRATE 25 MCG: 50 INJECTION, SOLUTION INTRAMUSCULAR; INTRAVENOUS at 12:42

## 2022-02-24 RX ADMIN — SODIUM CHLORIDE, PRESERVATIVE FREE 10 ML: 5 INJECTION INTRAVENOUS at 21:22

## 2022-02-24 RX ADMIN — OXYCODONE AND ACETAMINOPHEN 1 TABLET: 7.5; 325 TABLET ORAL at 23:21

## 2022-02-24 RX ADMIN — FENTANYL CITRATE 25 MCG: 50 INJECTION, SOLUTION INTRAMUSCULAR; INTRAVENOUS at 12:52

## 2022-02-24 RX ADMIN — FENTANYL CITRATE 25 MCG: 50 INJECTION, SOLUTION INTRAMUSCULAR; INTRAVENOUS at 12:17

## 2022-02-24 RX ADMIN — OXYCODONE AND ACETAMINOPHEN 1 TABLET: 7.5; 325 TABLET ORAL at 17:57

## 2022-02-24 RX ADMIN — DEXMEDETOMIDINE HYDROCHLORIDE 10 MCG: 100 INJECTION, SOLUTION, CONCENTRATE INTRAVENOUS at 12:15

## 2022-02-24 RX ADMIN — MORPHINE SULFATE 2 MG: 2 INJECTION, SOLUTION INTRAMUSCULAR; INTRAVENOUS at 21:20

## 2022-02-24 RX ADMIN — PIPERACILLIN AND TAZOBACTAM 3.38 G: 3; .375 INJECTION, POWDER, LYOPHILIZED, FOR SOLUTION INTRAVENOUS at 20:10

## 2022-02-24 RX ADMIN — SODIUM CHLORIDE, PRESERVATIVE FREE 10 ML: 5 INJECTION INTRAVENOUS at 14:13

## 2022-02-24 RX ADMIN — SODIUM HYPOCHLORITE: 1.25 SOLUTION TOPICAL at 14:13

## 2022-02-24 RX ADMIN — SODIUM CHLORIDE, POTASSIUM CHLORIDE, SODIUM LACTATE AND CALCIUM CHLORIDE 25 ML/HR: 600; 310; 30; 20 INJECTION, SOLUTION INTRAVENOUS at 12:00

## 2022-02-24 RX ADMIN — VANCOMYCIN HYDROCHLORIDE 1250 MG: 10 INJECTION, POWDER, LYOPHILIZED, FOR SOLUTION INTRAVENOUS at 20:07

## 2022-02-24 RX ADMIN — DEXMEDETOMIDINE HYDROCHLORIDE 10 MCG: 100 INJECTION, SOLUTION, CONCENTRATE INTRAVENOUS at 12:17
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
## 2022-02-24 NOTE — ANESTHESIA PREPROCEDURE EVALUATION
Relevant Problems   No relevant active problems       Anesthetic History   No history of anesthetic complications            Review of Systems / Medical History  Patient summary reviewed, nursing notes reviewed and pertinent labs reviewed    Pulmonary  Within defined limits                 Neuro/Psych   Within defined limits           Cardiovascular  Within defined limits          Dysrhythmias       Exercise tolerance: >4 METS     GI/Hepatic/Renal  Within defined limits              Endo/Other  Within defined limits      Anemia     Other Findings   Comments: Sepsis ([facility] [address]  Decubitus ulcer of ankle, stage 4 ([facility])  NSVT (nonsustained ventricular tachycardia) ([facility])           Physical Exam    Airway  Mallampati: II  TM Distance: > 6 cm  Neck ROM: normal range of motion   Mouth opening: Normal     Cardiovascular  Regular rate and rhythm,  S1 and S2 normal,  no murmur, click, rub, or gallop             Dental  No notable dental hx       Pulmonary  Breath sounds clear to auscultation               Abdominal  GI exam deferred       Other Findings            Anesthetic Plan    ASA: 3  Anesthesia type: general          Induction: Intravenous  Anesthetic plan and risks discussed with: Patient

## 2022-02-24 NOTE — DISCHARGE INSTRUCTIONS
Smoking Cessation Program:   [facility] is now offering a proven, interactive, text-based smoking cessation program for FREE! To register, please text Melanie Guillory [phone] or visit [employer]/quit  For more information, please call: [phone]

## 2022-02-24 NOTE — PROGRESS NOTES
0700- Report given to [facility], RN by off going nurse. 1100- Pt taken to OR in bed. 1400- Pt back in room. VSS. Medicated for pain. See MAR. Wound care completed. 1900- Report given to oncoming nurse by [facility], RN.

## 2022-02-24 NOTE — ANESTHESIA POSTPROCEDURE EVALUATION
Procedure(s):  RIGHT HIP IRRIGATION AND DEBRIDEMENT. general    Anesthesia Post Evaluation        Patient location during evaluation: PACU  Note status: Adequate. Level of consciousness: responsive to verbal stimuli and sleepy but conscious  Pain management: satisfactory to patient  Airway patency: patent  Anesthetic complications: no  Cardiovascular status: acceptable  Respiratory status: acceptable  Hydration status: acceptable  Comments: +Post-Anesthesia Evaluation and Assessment    Patient: Emilia Henderson MRN: [id number]  SSN: [id number]   YOB: 1995  Age: 23 y.o. Sex: male      Cardiovascular Function/Vital Signs    /68   Pulse 89   Temp 36.7 °C (98 °F)   Resp 21   Ht 6' (1.829 m)   Wt 54.4 kg (120 lb)   SpO2 100%   BMI 16.27 kg/m²     Patient is status post Procedure(s):  RIGHT HIP IRRIGATION AND DEBRIDEMENT. Nausea/Vomiting: Controlled. Postoperative hydration reviewed and adequate. Pain:  Pain Scale 1: Numeric (0 - 10) (02/24/22 1343)  Pain Intensity 1: 5 (02/24/22 1343)   Managed. Neurological Status:   Neuro (WDL): Exceptions to WDL (02/24/22 1306)   At baseline. Mental Status and Level of Consciousness: Arousable. Pulmonary Status:   O2 Device: Nasal cannula (02/24/22 1309)   Adequate oxygenation and airway patent. Complications related to anesthesia: None    Post-anesthesia assessment completed. No concerns. Signed By: Bailey Al MD    2/24/2022  Post anesthesia nausea and vomiting:  controlled      INITIAL Post-op Vital signs:   Vitals Value Taken Time   /59 02/24/22 1345   Temp 36.7 °C (98 °F) 02/24/22 1309   Pulse 83 02/24/22 1354   Resp 13 02/24/22 1354   SpO2 99 % 02/24/22 1354   Vitals shown include unvalidated device data.

## 2022-02-24 NOTE — PERIOP NOTES
1306-Handoff Report from Operating Room to PACU    Report received from [facility] and Jorge Rod CRNA regarding Juliana Omalley. Surgeon(s):  Sofia HERNANDEZ DO  And Procedure(s) (LRB):  RIGHT HIP INCISION AND DRAINAGE LOWER EXTREMITY (Right)  confirmed   with allergies and dressings discussed. Anesthesia type, drugs, patient history, complications, estimated blood loss, vital signs, intake and output, and last pain medication, lines, reversal medications and temperature were reviewed. 1350- TRANSFER - OUT REPORT:    Verbal report given to [facility] RN(name) on Juliana Omalley  being transferred to [facility](unit) for routine post - op       Report consisted of patients Situation, Background, Assessment and   Recommendations(SBAR). Information from the following report(s) SBAR, Kardex, OR Summary, Procedure Summary, Intake/Output, MAR and Recent Results was reviewed with the receiving nurse. Opportunity for questions and clarification was provided.       Patient transported with:   tech
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
## 2022-02-24 NOTE — PROGRESS NOTES
Pharmacy Automatic Renal Dosing Protocol - Antimicrobials    Indication for Antimicrobials: Bone and Joint Infection    Current Regimen of Each Antimicrobial:  Vancomycin 1000 mg Q8H  (Start Date ; Day # 27 )  Zosyn 3.375 g IV Q8H (Start Date ; Day #  )  Previous Antimicrobial Therapy:    Vancomycin Goal Level: 400-600 mg*hour/Liter per 24 hours    Vancomycin Levels  Date Dose & Interval Measured (mcg/mL) AUC / Steady State (mcg/mL)   22 1000 mg Q8H 9.2 368, 7                  Date & time of next level:     Significant Cultures:         Radiology / Imaging results: (X-ray, CT scan or MRI):       Labs:  Recent Labs     22  0447 22  0143 22  1614   CREA 0.33* 0.31* 0.42*   BUN 3* 5* 5*   WBC 4.7 6.4 6.9     Temp (24hrs), Av.5 °F (36.9 °C), Min:98.3 °F (36.8 °C), Max:98.6 °F (37 °C)      Paralysis, amputations, malnutrition: Paraplegia, bilateral amputations   Creatinine Clearance (mL/min) or Dialysis: 124    Impression/Plan:   Antibiotics as above   Vancomycin AUC low, increase to 1250 mg Q8H with anticipated AUC of 460, trough of 9 mcg/ml. [facility] daily      Pharmacy will follow daily and adjust medications as appropriate for renal function and/or serum levels. Thank you,  Juan Manuel Delgado, [facility]      Recommended duration of therapy  [email or web address]/[email or web address]. docx    Renal Dosing  [email or web address]/[email or web address]/Renal%[id number]. pdf

## 2022-02-24 NOTE — PERIOP NOTES
TRANSFER - IN REPORT:    Verbal report received from   [facility]  on Emilia Grumbling  being received from   Rm. [id number]   Med Tele    Report consisted of patients Situation, Background, Assessment and   Recommendations(SBAR). Information from the following reports was reviewed with the receiving nurse. Opportunity for questions and clarification was provided. Assessment completed upon patients arrival to unit and care assumed. Pt is telemetry on room air  covid test done yesterday was negative. Pt has malcolm draining urine at this time.

## 2022-02-24 NOTE — ROUTINE PROCESS
sbar in note. Pt to [facility] identifies self and procedure for today. Pt has been npo  Since midnight.     covid test negative. No vaccine takened  No s/s covid virus    Wears mask in public.   gavin completd. No mepilex applied as it would be in the surgical field and pt has dressing in place. PT REPORTS HE HAS TOOTHACHE SINCE YESTERDAY STATES THE MD ORDERED ORAGEL BUT IT HAS NOT COME FROM PHARMACY YET.

## 2022-02-24 NOTE — PROGRESS NOTES
Responded to Rapid Response called on this patient for chest pain. Patient complains of sharp chest pain to right chest, non radiating. When asked to rate pain, patient stated \"it's gone\"   Patient reported pain was present when lying on right side but subsides when on back or other side. Purveyor at bedside to assess patient. Orders received. Vitals all stable, pain seems positional.  Primary nurse will administer PRN pain medication and add Troponin level to AM labs. Patient will remain in room.
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
## 2022-02-25 LAB
ANION GAP SERPL CALC-SCNC: 3 MMOL/L (ref 5–15)
BACTERIA SPEC CULT: ABNORMAL
BACTERIA SPEC CULT: NORMAL
BUN SERPL-MCNC: 7 MG/DL (ref 6–20)
BUN/CREAT SERPL: 24 (ref 12–20)
CALCIUM SERPL-MCNC: 8.4 MG/DL (ref 8.5–10.1)
CHLORIDE SERPL-SCNC: 107 MMOL/L (ref 97–108)
CO2 SERPL-SCNC: 28 MMOL/L (ref 21–32)
COMMENT, HOLDF: NORMAL
CREAT SERPL-MCNC: 0.29 MG/DL (ref 0.7–1.3)
GLUCOSE SERPL-MCNC: 104 MG/DL (ref 65–100)
POTASSIUM SERPL-SCNC: 4.3 MMOL/L (ref 3.5–5.1)
SAMPLES BEING HELD,HOLD: NORMAL
SERVICE CMNT-IMP: ABNORMAL
SERVICE CMNT-IMP: NORMAL
SODIUM SERPL-SCNC: 138 MMOL/L (ref 136–145)

## 2022-02-25 PROCEDURE — 65660000000 HC RM CCU STEPDOWN

## 2022-02-25 PROCEDURE — 27030 ARTHROTOMY HIP W/DRAINAGE: CPT | Performed by: ORTHOPAEDIC SURGERY

## 2022-02-25 PROCEDURE — 74011000250 HC RX REV CODE- 250: Performed by: INTERNAL MEDICINE

## 2022-02-25 PROCEDURE — 74011250637 HC RX REV CODE- 250/637: Performed by: STUDENT IN AN ORGANIZED HEALTH CARE EDUCATION/TRAINING PROGRAM

## 2022-02-25 PROCEDURE — 99233 SBSQ HOSP IP/OBS HIGH 50: CPT | Performed by: INTERNAL MEDICINE

## 2022-02-25 PROCEDURE — 0S990ZZ DRAINAGE OF RIGHT HIP JOINT, OPEN APPROACH: ICD-10-PCS | Performed by: ORTHOPAEDIC SURGERY

## 2022-02-25 PROCEDURE — 74011250636 HC RX REV CODE- 250/636: Performed by: NURSE PRACTITIONER

## 2022-02-25 PROCEDURE — 74011000258 HC RX REV CODE- 258: Performed by: INTERNAL MEDICINE

## 2022-02-25 PROCEDURE — 74011250637 HC RX REV CODE- 250/637: Performed by: EMERGENCY MEDICINE

## 2022-02-25 PROCEDURE — 74011250636 HC RX REV CODE- 250/636: Performed by: INTERNAL MEDICINE

## 2022-02-25 PROCEDURE — 80048 BASIC METABOLIC PNL TOTAL CA: CPT

## 2022-02-25 PROCEDURE — 36415 COLL VENOUS BLD VENIPUNCTURE: CPT

## 2022-02-25 PROCEDURE — 87040 BLOOD CULTURE FOR BACTERIA: CPT

## 2022-02-25 RX ORDER — HYDROMORPHONE HYDROCHLORIDE 1 MG/ML
0.5 INJECTION, SOLUTION INTRAMUSCULAR; INTRAVENOUS; SUBCUTANEOUS
Status: DISCONTINUED | OUTPATIENT
Start: 2022-02-25 | End: 2022-02-26

## 2022-02-25 RX ADMIN — PIPERACILLIN AND TAZOBACTAM 3.38 G: 3; .375 INJECTION, POWDER, LYOPHILIZED, FOR SOLUTION INTRAVENOUS at 21:31

## 2022-02-25 RX ADMIN — MORPHINE SULFATE 2 MG: 2 INJECTION, SOLUTION INTRAMUSCULAR; INTRAVENOUS at 18:34

## 2022-02-25 RX ADMIN — OXYCODONE AND ACETAMINOPHEN 1 TABLET: 7.5; 325 TABLET ORAL at 10:39

## 2022-02-25 RX ADMIN — COLLAGENASE SANTYL: 250 OINTMENT TOPICAL at 08:39

## 2022-02-25 RX ADMIN — ACETAMINOPHEN 325MG 650 MG: 325 TABLET ORAL at 11:54

## 2022-02-25 RX ADMIN — VANCOMYCIN HYDROCHLORIDE 1250 MG: 10 INJECTION, POWDER, LYOPHILIZED, FOR SOLUTION INTRAVENOUS at 10:40

## 2022-02-25 RX ADMIN — SODIUM CHLORIDE, PRESERVATIVE FREE 10 ML: 5 INJECTION INTRAVENOUS at 18:36

## 2022-02-25 RX ADMIN — OXYCODONE AND ACETAMINOPHEN 1 TABLET: 7.5; 325 TABLET ORAL at 22:11

## 2022-02-25 RX ADMIN — PIPERACILLIN AND TAZOBACTAM 3.38 G: 3; .375 INJECTION, POWDER, LYOPHILIZED, FOR SOLUTION INTRAVENOUS at 03:11

## 2022-02-25 RX ADMIN — OXYCODONE AND ACETAMINOPHEN 1 TABLET: 7.5; 325 TABLET ORAL at 03:11

## 2022-02-25 RX ADMIN — MORPHINE SULFATE 2 MG: 2 INJECTION, SOLUTION INTRAMUSCULAR; INTRAVENOUS at 13:48

## 2022-02-25 RX ADMIN — MORPHINE SULFATE 2 MG: 2 INJECTION, SOLUTION INTRAMUSCULAR; INTRAVENOUS at 08:38

## 2022-02-25 RX ADMIN — MORPHINE SULFATE 2 MG: 2 INJECTION, SOLUTION INTRAMUSCULAR; INTRAVENOUS at 01:58

## 2022-02-25 RX ADMIN — ENOXAPARIN SODIUM 40 MG: 100 INJECTION SUBCUTANEOUS at 08:38

## 2022-02-25 RX ADMIN — VANCOMYCIN HYDROCHLORIDE 1250 MG: 10 INJECTION, POWDER, LYOPHILIZED, FOR SOLUTION INTRAVENOUS at 18:34

## 2022-02-25 RX ADMIN — PIPERACILLIN AND TAZOBACTAM 3.38 G: 3; .375 INJECTION, POWDER, LYOPHILIZED, FOR SOLUTION INTRAVENOUS at 13:49

## 2022-02-25 RX ADMIN — ACETAMINOPHEN 325MG 650 MG: 325 TABLET ORAL at 06:19

## 2022-02-25 RX ADMIN — SODIUM HYPOCHLORITE: 1.25 SOLUTION TOPICAL at 08:38

## 2022-02-25 RX ADMIN — SODIUM CHLORIDE, PRESERVATIVE FREE 10 ML: 5 INJECTION INTRAVENOUS at 06:32

## 2022-02-25 NOTE — PROGRESS NOTES
Problem: Pressure Injury - Risk of  Goal: *Prevention of pressure injury  Description: Document Dimitri Scale and appropriate interventions in the flowsheet. Outcome: Progressing Towards Goal  Note: Pressure Injury Interventions:  Sensory Interventions: Assess changes in LOC,Keep linens dry and wrinkle-free,Assess need for specialty bed    Moisture Interventions: Absorbent underpads,Apply protective barrier, creams and emollients,Check for incontinence Q2 hours and as needed,Internal/External urinary devices,Limit adult briefs,Maintain skin hydration (lotion/cream),Minimize layers,Offer toileting Q_hr    Activity Interventions: Assess need for specialty bed,Increase time out of bed,Pressure redistribution bed/mattress(bed type)    Mobility Interventions: Assess need for specialty bed,Float heels,HOB 30 degrees or less,Pressure redistribution bed/mattress (bed type),PT/OT evaluation,Turn and reposition approx. every two hours(pillow and wedges)    Nutrition Interventions: Document food/fluid/supplement intake,Offer support with meals,snacks and hydration    Friction and Shear Interventions: HOB 30 degrees or less,Lift sheet,Lift team/patient mobility team,Minimize layers,Transferring/repositioning devices                Problem: Pressure Injury - Risk of  Goal: *Prevention of pressure injury  Description: Document Dimitri Scale and appropriate interventions in the flowsheet.   Outcome: Progressing Towards Goal  Note: Pressure Injury Interventions:  Sensory Interventions: Assess changes in LOC,Keep linens dry and wrinkle-free,Assess need for specialty bed    Moisture Interventions: Absorbent underpads,Apply protective barrier, creams and emollients,Check for incontinence Q2 hours and as needed,Internal/External urinary devices,Limit adult briefs,Maintain skin hydration (lotion/cream),Minimize layers,Offer toileting Q_hr    Activity Interventions: Assess need for specialty bed,Increase time out of bed,Pressure redistribution bed/mattress(bed type)    Mobility Interventions: Assess need for specialty bed,Float heels,HOB 30 degrees or less,Pressure redistribution bed/mattress (bed type),PT/OT evaluation,Turn and reposition approx. every two hours(pillow and wedges)    Nutrition Interventions: Document food/fluid/supplement intake,Offer support with meals,snacks and hydration    Friction and Shear Interventions: HOB 30 degrees or less,Lift sheet,Lift team/patient mobility team,Minimize layers,Transferring/repositioning devices                Problem: Falls - Risk of  Goal: *Absence of Falls  Description: Document Ezekiel Fall Risk and appropriate interventions in the flowsheet.   Outcome: Progressing Towards Goal  Note: Fall Risk Interventions:  Mobility Interventions: Patient to call before getting OOB         Medication Interventions: Patient to call before getting OOB    Elimination Interventions: Bed/chair exit alarm,Call light in reach,Patient to call for help with toileting needs,Stay With Me (per policy),Toilet paper/wipes in reach,Toileting schedule/hourly rounds    History of Falls Interventions: Door open when patient unattended
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
## 2022-02-25 NOTE — OP NOTES
Date of Procedure: 2/24/22  Preoperative Diagnosis: septic right hip  Postoperative Diagnosis: same  Procedure Performed: arthrotomy right hip with drainage and excisional debridement  Surgeon: Elder Wills DO  Assistant: none  Anesthesia: general  Estimated Blood Loss: 20cc  Specimens: cultures, aerobic and anaerobic x 3 each  Drains: large hemovac x 1  Complications: none  Implants: stimulan beads with vancomycin      Operative Indications: This is a 23 y.o. male who has a history of paraplegia, multiple ulcerations in the buttock region, right hip pain, work-up revealed septic hip. We did discuss the risks of surgery which include but are not limited to infection, nerve or blood vessel damage, failure of fixation, failure of any possible implant, need for reoperation, postoperative pain and swelling, intra-or postoperative fracture, postoperative mechanical failure, need for reoperation, implant failure, death, disability, organ dysfunction, wound healing issues, DVT, PE, and the need for further procedures. The patient did freely state their understanding and satisfaction with our discussion. Appropriate medical clearances have been obtained. Description of Procedure:  After the correct site and side were identified by myself in the holding area, the patient was transported to the surgical suite, where, after induction of general anesthesia, the patient was positioned in the supine position. The right hip was then prepped and draped in the usual sterile orthopedic fashion. After an appropriate timeout, and confirmation that he was on scheduled antibiotics, incision was made, 4cm, starting 2 cm lateral to and distal to the ASIS, sharp dissection was carried down to fascia. Strict hemostasis was maintained with use electrocautery.   Fascia was then incised in line with the tensor fascia nick, this was then bluntly mobilized off of its fascia, deep fascia was then retracted, I isolated the ascending branch of the lateral femoral circumflex artery, this was coagulated and divided, I then divided the remaining fascia, this did give me access to the femoral neck and capsule, retractors were placed about the capsule, I then performed to an H type capsulotomy, this did reveal the femoral neck, joint fluid was extruded, this was inflammatory, but not grossly purulent, I elevated the flaps to allow for good access of irrigation, I debrided the synovium with a rongeur of any nonviable appearing tissue. I then irrigated with 6 L of normal saline mixed with Betadine. Once this was adequately irrigated and debrided, I loosely closed the capsule, then I placed absorbable beads in the periarticular space, I placed a medium Hemovac drain exiting laterally. Fascia was closed with 0 PDS suture. Skin closed with 2-0 Monocryl. Staples. Sterile dressing was applied. Patient was awoken and taken to PACU in stable condition without complication. Postoperative plan: Drain will remain in place until only minimal drainage, antibiotics are scheduled basis per infectious disease, no restriction of motion at this point.   Follow-up in 2 weeks for repeat clinical check and staple removal.
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
## 2022-02-26 LAB
ANION GAP SERPL CALC-SCNC: 4 MMOL/L (ref 5–15)
BACTERIA SPEC CULT: ABNORMAL
BACTERIA SPEC CULT: NORMAL
BUN SERPL-MCNC: 5 MG/DL (ref 6–20)
BUN/CREAT SERPL: 12 (ref 12–20)
CALCIUM SERPL-MCNC: 8.1 MG/DL (ref 8.5–10.1)
CHLORIDE SERPL-SCNC: 108 MMOL/L (ref 97–108)
CO2 SERPL-SCNC: 26 MMOL/L (ref 21–32)
CREAT SERPL-MCNC: 0.43 MG/DL (ref 0.7–1.3)
GLUCOSE SERPL-MCNC: 140 MG/DL (ref 65–100)
GRAM STN SPEC: ABNORMAL
POTASSIUM SERPL-SCNC: 3.9 MMOL/L (ref 3.5–5.1)
SERVICE CMNT-IMP: ABNORMAL
SERVICE CMNT-IMP: NORMAL
SODIUM SERPL-SCNC: 138 MMOL/L (ref 136–145)
VANCOMYCIN SERPL-MCNC: 12.9 UG/ML

## 2022-02-26 PROCEDURE — 74011000258 HC RX REV CODE- 258: Performed by: INTERNAL MEDICINE

## 2022-02-26 PROCEDURE — 74011000250 HC RX REV CODE- 250: Performed by: INTERNAL MEDICINE

## 2022-02-26 PROCEDURE — 65660000000 HC RM CCU STEPDOWN

## 2022-02-26 PROCEDURE — 74011250637 HC RX REV CODE- 250/637: Performed by: EMERGENCY MEDICINE

## 2022-02-26 PROCEDURE — 36415 COLL VENOUS BLD VENIPUNCTURE: CPT

## 2022-02-26 PROCEDURE — 80202 ASSAY OF VANCOMYCIN: CPT

## 2022-02-26 PROCEDURE — 74011250636 HC RX REV CODE- 250/636: Performed by: INTERNAL MEDICINE

## 2022-02-26 PROCEDURE — 80048 BASIC METABOLIC PNL TOTAL CA: CPT

## 2022-02-26 PROCEDURE — 74011250636 HC RX REV CODE- 250/636: Performed by: NURSE PRACTITIONER

## 2022-02-26 RX ORDER — HYDROMORPHONE HYDROCHLORIDE 1 MG/ML
0.5 INJECTION, SOLUTION INTRAMUSCULAR; INTRAVENOUS; SUBCUTANEOUS
Status: DISCONTINUED | OUTPATIENT
Start: 2022-02-26 | End: 2022-02-28

## 2022-02-26 RX ADMIN — SODIUM HYPOCHLORITE: 1.25 SOLUTION TOPICAL at 20:24

## 2022-02-26 RX ADMIN — SODIUM CHLORIDE, PRESERVATIVE FREE 10 ML: 5 INJECTION INTRAVENOUS at 01:07

## 2022-02-26 RX ADMIN — HYDROMORPHONE HYDROCHLORIDE 0.5 MG: 1 INJECTION, SOLUTION INTRAMUSCULAR; INTRAVENOUS; SUBCUTANEOUS at 14:27

## 2022-02-26 RX ADMIN — VANCOMYCIN HYDROCHLORIDE 1250 MG: 10 INJECTION, POWDER, LYOPHILIZED, FOR SOLUTION INTRAVENOUS at 09:08

## 2022-02-26 RX ADMIN — VANCOMYCIN HYDROCHLORIDE 1250 MG: 10 INJECTION, POWDER, LYOPHILIZED, FOR SOLUTION INTRAVENOUS at 02:30

## 2022-02-26 RX ADMIN — ACETAMINOPHEN 325MG 650 MG: 325 TABLET ORAL at 08:44

## 2022-02-26 RX ADMIN — SODIUM HYPOCHLORITE: 1.25 SOLUTION TOPICAL at 12:08

## 2022-02-26 RX ADMIN — ENOXAPARIN SODIUM 40 MG: 100 INJECTION SUBCUTANEOUS at 08:42

## 2022-02-26 RX ADMIN — PIPERACILLIN AND TAZOBACTAM 3.38 G: 3; .375 INJECTION, POWDER, LYOPHILIZED, FOR SOLUTION INTRAVENOUS at 20:23

## 2022-02-26 RX ADMIN — HYDROMORPHONE HYDROCHLORIDE 0.5 MG: 1 INJECTION, SOLUTION INTRAMUSCULAR; INTRAVENOUS; SUBCUTANEOUS at 01:07

## 2022-02-26 RX ADMIN — HYDROMORPHONE HYDROCHLORIDE 0.5 MG: 1 INJECTION, SOLUTION INTRAMUSCULAR; INTRAVENOUS; SUBCUTANEOUS at 08:42

## 2022-02-26 RX ADMIN — PIPERACILLIN AND TAZOBACTAM 3.38 G: 3; .375 INJECTION, POWDER, LYOPHILIZED, FOR SOLUTION INTRAVENOUS at 03:48

## 2022-02-26 RX ADMIN — COLLAGENASE SANTYL: 250 OINTMENT TOPICAL at 20:23

## 2022-02-26 RX ADMIN — PIPERACILLIN AND TAZOBACTAM 3.38 G: 3; .375 INJECTION, POWDER, LYOPHILIZED, FOR SOLUTION INTRAVENOUS at 12:06

## 2022-02-26 RX ADMIN — SODIUM CHLORIDE, PRESERVATIVE FREE 10 ML: 5 INJECTION INTRAVENOUS at 14:27

## 2022-02-26 RX ADMIN — HYDROMORPHONE HYDROCHLORIDE 0.5 MG: 1 INJECTION, SOLUTION INTRAMUSCULAR; INTRAVENOUS; SUBCUTANEOUS at 17:21

## 2022-02-26 RX ADMIN — VANCOMYCIN HYDROCHLORIDE 1250 MG: 10 INJECTION, POWDER, LYOPHILIZED, FOR SOLUTION INTRAVENOUS at 17:21

## 2022-02-26 RX ADMIN — MORPHINE SULFATE 2 MG: 2 INJECTION, SOLUTION INTRAMUSCULAR; INTRAVENOUS at 12:05

## 2022-02-26 RX ADMIN — SODIUM CHLORIDE, PRESERVATIVE FREE 10 ML: 5 INJECTION INTRAVENOUS at 21:47

## 2022-02-26 RX ADMIN — SODIUM CHLORIDE, PRESERVATIVE FREE 10 ML: 5 INJECTION INTRAVENOUS at 06:09

## 2022-02-26 RX ADMIN — HYDROMORPHONE HYDROCHLORIDE 0.5 MG: 1 INJECTION, SOLUTION INTRAMUSCULAR; INTRAVENOUS; SUBCUTANEOUS at 23:23

## 2022-02-26 RX ADMIN — MORPHINE SULFATE 2 MG: 2 INJECTION, SOLUTION INTRAMUSCULAR; INTRAVENOUS at 06:41

## 2022-02-26 NOTE — PROGRESS NOTES
Spiritual Care Assessment/Progress Note  [facility]      NAME: Babak Osorio      MRN: [id number]  AGE: 23 y.o.  SEX: male  Mandaeism Affiliation: Orthodox   Language: English     2/26/2022     Total Time (in minutes): 14     Spiritual Assessment begun in [facility] 2 MED TELE through conversation with:         [x]Patient        [] Family    [] Friend(s)        Reason for Consult: Initial/Spiritual assessment, patient floor     Spiritual beliefs: (Please include comment if needed)     [] Identifies with a perla tradition:         [] Supported by a perla community:            [] Claims no spiritual orientation:           [] Seeking spiritual identity:                [] Adheres to an individual form of spirituality:           [x] Not able to assess:   Did not indicate                        Identified resources for coping:      [] Prayer                               [] Music                  [] Guided Imagery     [x] Family/friends                 [] Pet visits     [] Devotional reading                         [] Unknown     [] Other:                                          Interventions offered during this visit: (See comments for more details)    Patient Interventions: Affirmation of emotions/emotional suffering,Catharsis/review of pertinent events in supportive environment,Initial/Spiritual assessment, patient floor           Plan of Care:     [] Support spiritual and/or cultural needs    [] Support AMD and/or advance care planning process      [] Support grieving process   [] Coordinate Rites and/or Rituals    [] Coordination with community clergy   [x] No spiritual needs identified at this time   [] Detailed Plan of Care below (See Comments)  [] Make referral to Music Therapy  [] Make referral to Pet Therapy     [] Make referral to Addiction services  [] Make referral to [facility]  [] Make referral to Spiritual Care Partner  [] No future visits requested        [x] Contact Spiritual Care for further referrals     Comments:  Reviewed chart prior to visit on [facility] for spiritual assessment. Patient's girlfriend was visiting. Patient's lunch from a local restaurant had just been delivered and he was preparing to eat. Spoke about a shared love of pulled pork BBQ with desiree. Explained role of . Patient indicated he is doing well today; no needs expressed. Offered words of encouragement and advised him of ongoing availability of pastoral support.      ELISSA Chan, [facility], Staff [address]    [address] Paging Service  [phone]

## 2022-02-26 NOTE — PROGRESS NOTES
Pharmacy Automatic Renal Dosing Protocol - Antimicrobials    Indication for Antimicrobials: Bone and Joint Infection    Current Regimen of Each Antimicrobial:  Vancomycin 1000 mg Q8H  (Start Date ; Day #  )  Zosyn 3.375 g IV Q8H (Start Date ; Day #  )  Previous Antimicrobial Therapy:    Vancomycin Goal Level: 400-600 mg*hour/Liter per 24 hours    Vancomycin Levels  Date Dose & Interval Measured (mcg/mL) AUC / Steady State (mcg/mL)   22 1000 mg Q8H 9.2 368, 7     0848 1250mg q 8h 12.9 599, 14           Date & time of next level:   or 3/1    Significant Cultures:    Diptheroids, coag-neg   Body fluid - GNR  Radiology / Imaging results: (X-ray, CT scan or MRI):     Labs:  Recent Labs     22  0116 22  0350 22  0447   CREA 0.43* 0.29* 0.33*   BUN 5* 7 3*   WBC  --   --  4.7     Temp (24hrs), Av.5 °F (36.9 °C), Min:98 °F (36.7 °C), Max:98.8 °F (37.1 °C)      Paralysis, amputations, malnutrition: Paraplegia, bilateral amputations   Creatinine Clearance (mL/min) or Dialysis: 124    Impression/Plan:   Antibiotics as above   Vancomycin AUC within 500-600 range, will keep current regimen of 1250mg q 8h  BMP daily      Pharmacy will follow daily and adjust medications as appropriate for renal function and/or serum levels. Thank you,  LUIS A Sanders      Recommended duration of therapy  [email or web address]/Pharmacy/Clinical%20Companion/Duration%20of%[email or web address]. docx    Renal Dosing  [email or web address]/[facility]/Pharmacy/Clinical%20Companion/Renal%[email or web address]. pdf

## 2022-02-27 LAB
ANION GAP SERPL CALC-SCNC: 4 MMOL/L (ref 5–15)
BUN SERPL-MCNC: 6 MG/DL (ref 6–20)
BUN/CREAT SERPL: 15 (ref 12–20)
CALCIUM SERPL-MCNC: 8.5 MG/DL (ref 8.5–10.1)
CHLORIDE SERPL-SCNC: 105 MMOL/L (ref 97–108)
CO2 SERPL-SCNC: 28 MMOL/L (ref 21–32)
CREAT SERPL-MCNC: 0.4 MG/DL (ref 0.7–1.3)
ERYTHROCYTE [DISTWIDTH] IN BLOOD BY AUTOMATED COUNT: 17.7 % (ref 11.5–14.5)
GLUCOSE SERPL-MCNC: 109 MG/DL (ref 65–100)
HCT VFR BLD AUTO: 29.1 % (ref 36.6–50.3)
HGB BLD-MCNC: 7.9 G/DL (ref 12.1–17)
MCH RBC QN AUTO: 24.2 PG (ref 26–34)
MCHC RBC AUTO-ENTMCNC: 27.1 G/DL (ref 30–36.5)
MCV RBC AUTO: 89 FL (ref 80–99)
NRBC # BLD: 0 K/UL (ref 0–0.01)
NRBC BLD-RTO: 0 PER 100 WBC
PLATELET # BLD AUTO: 654 K/UL (ref 150–400)
PMV BLD AUTO: 8 FL (ref 8.9–12.9)
POTASSIUM SERPL-SCNC: 4 MMOL/L (ref 3.5–5.1)
RBC # BLD AUTO: 3.27 M/UL (ref 4.1–5.7)
SODIUM SERPL-SCNC: 137 MMOL/L (ref 136–145)
WBC # BLD AUTO: 6 K/UL (ref 4.1–11.1)

## 2022-02-27 PROCEDURE — 85027 COMPLETE CBC AUTOMATED: CPT

## 2022-02-27 PROCEDURE — 74011250636 HC RX REV CODE- 250/636: Performed by: INTERNAL MEDICINE

## 2022-02-27 PROCEDURE — 77030040393 HC DRSG OPTIFOAM GENT MDII -B

## 2022-02-27 PROCEDURE — 74011000258 HC RX REV CODE- 258: Performed by: INTERNAL MEDICINE

## 2022-02-27 PROCEDURE — 65660000000 HC RM CCU STEPDOWN

## 2022-02-27 PROCEDURE — 2709999900 HC NON-CHARGEABLE SUPPLY

## 2022-02-27 PROCEDURE — 36415 COLL VENOUS BLD VENIPUNCTURE: CPT

## 2022-02-27 PROCEDURE — 80048 BASIC METABOLIC PNL TOTAL CA: CPT

## 2022-02-27 PROCEDURE — 74011000250 HC RX REV CODE- 250: Performed by: INTERNAL MEDICINE

## 2022-02-27 PROCEDURE — 74011250637 HC RX REV CODE- 250/637: Performed by: STUDENT IN AN ORGANIZED HEALTH CARE EDUCATION/TRAINING PROGRAM

## 2022-02-27 RX ADMIN — PIPERACILLIN AND TAZOBACTAM 3.38 G: 3; .375 INJECTION, POWDER, LYOPHILIZED, FOR SOLUTION INTRAVENOUS at 12:51

## 2022-02-27 RX ADMIN — HYDROMORPHONE HYDROCHLORIDE 0.5 MG: 1 INJECTION, SOLUTION INTRAMUSCULAR; INTRAVENOUS; SUBCUTANEOUS at 21:04

## 2022-02-27 RX ADMIN — VANCOMYCIN HYDROCHLORIDE 1250 MG: 10 INJECTION, POWDER, LYOPHILIZED, FOR SOLUTION INTRAVENOUS at 00:30

## 2022-02-27 RX ADMIN — SODIUM CHLORIDE, PRESERVATIVE FREE 10 ML: 5 INJECTION INTRAVENOUS at 05:54

## 2022-02-27 RX ADMIN — PIPERACILLIN AND TAZOBACTAM 3.38 G: 3; .375 INJECTION, POWDER, LYOPHILIZED, FOR SOLUTION INTRAVENOUS at 20:11

## 2022-02-27 RX ADMIN — PIPERACILLIN AND TAZOBACTAM 3.38 G: 3; .375 INJECTION, POWDER, LYOPHILIZED, FOR SOLUTION INTRAVENOUS at 03:00

## 2022-02-27 RX ADMIN — SODIUM CHLORIDE, PRESERVATIVE FREE 10 ML: 5 INJECTION INTRAVENOUS at 21:07

## 2022-02-27 RX ADMIN — COLLAGENASE SANTYL: 250 OINTMENT TOPICAL at 21:07

## 2022-02-27 RX ADMIN — ENOXAPARIN SODIUM 40 MG: 100 INJECTION SUBCUTANEOUS at 08:56

## 2022-02-27 RX ADMIN — VANCOMYCIN HYDROCHLORIDE 1250 MG: 10 INJECTION, POWDER, LYOPHILIZED, FOR SOLUTION INTRAVENOUS at 08:56

## 2022-02-27 RX ADMIN — SODIUM CHLORIDE, PRESERVATIVE FREE 10 ML: 5 INJECTION INTRAVENOUS at 15:03

## 2022-02-27 RX ADMIN — OXYCODONE AND ACETAMINOPHEN 1 TABLET: 7.5; 325 TABLET ORAL at 19:53

## 2022-02-27 RX ADMIN — OXYCODONE AND ACETAMINOPHEN 1 TABLET: 7.5; 325 TABLET ORAL at 00:51

## 2022-02-27 RX ADMIN — HYDROMORPHONE HYDROCHLORIDE 0.5 MG: 1 INJECTION, SOLUTION INTRAMUSCULAR; INTRAVENOUS; SUBCUTANEOUS at 15:03

## 2022-02-27 RX ADMIN — VANCOMYCIN HYDROCHLORIDE 1250 MG: 10 INJECTION, POWDER, LYOPHILIZED, FOR SOLUTION INTRAVENOUS at 18:03

## 2022-02-27 RX ADMIN — HYDROMORPHONE HYDROCHLORIDE 0.5 MG: 1 INJECTION, SOLUTION INTRAMUSCULAR; INTRAVENOUS; SUBCUTANEOUS at 18:07

## 2022-02-27 RX ADMIN — HYDROMORPHONE HYDROCHLORIDE 0.5 MG: 1 INJECTION, SOLUTION INTRAMUSCULAR; INTRAVENOUS; SUBCUTANEOUS at 08:53

## 2022-02-27 RX ADMIN — OXYCODONE AND ACETAMINOPHEN 1 TABLET: 7.5; 325 TABLET ORAL at 12:50

## 2022-02-27 RX ADMIN — SODIUM HYPOCHLORITE: 1.25 SOLUTION TOPICAL at 21:08
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
## 2022-02-27 NOTE — PROGRESS NOTES
Pharmacy Automatic Renal Dosing Protocol - Antimicrobials    Indication for Antimicrobials: Bone and Joint Infection    Current Regimen of Each Antimicrobial:  Vancomycin 1000 mg Q8H  (Start Date ; Day # 6 )  Zosyn 3.375 g IV Q8H (Start Date ; Day # 6 )  Previous Antimicrobial Therapy:    Vancomycin Goal Level: 400-600 mg*hour/Liter per 24 hours    Vancomycin Levels  Date Dose & Interval Measured (mcg/mL) AUC / Steady State (mcg/mL)   22 1000 mg Q8H 9.2 368, 7     0848 1250mg q 8h 12.9 599, 14           Date & time of next level:  prior to 09    Significant Cultures:    Diptheroids, coag-neg   Body fluid - GNR  Radiology / Imaging results: (X-ray, CT scan or MRI):     Labs:  Recent Labs     22  0311 22  0039 22  0116 22  0350   CREA  --  0.40* 0.43* 0.29*   BUN  --  6 5* 7   WBC 6.0  --   --   --      Temp (24hrs), Av.1 °F (36.7 °C), Min:98 °F (36.7 °C), Max:98.2 °F (36.8 °C)      Paralysis, amputations, malnutrition: Paraplegia, bilateral amputations   Creatinine Clearance (mL/min) or Dialysis: 124    Impression/Plan:   Antibiotics as above   Vancomycin AUC within 500-600 range, will keep current regimen of 1250mg q 8h  Vancomycin trough Monday,  prior to 0900  [facility] daily      Pharmacy will follow daily and adjust medications as appropriate for renal function and/or serum levels. Thank you,  LUIS A Lema      Recommended duration of therapy  http://[facility]/[place]/[facility]/Pharmacy/Clinical%20Companion/Duration%20of%20ABX%20therapy. docx    Renal Dosing  http://[facility]/[place]/[facility]/Pharmacy/Clinical%20Companion/Renal%[id number]. pdf

## 2022-02-28 LAB
ANION GAP SERPL CALC-SCNC: 7 MMOL/L (ref 5–15)
BACTERIA SPEC CULT: NORMAL
BUN SERPL-MCNC: 9 MG/DL (ref 6–20)
BUN/CREAT SERPL: 29 (ref 12–20)
CALCIUM SERPL-MCNC: 8.8 MG/DL (ref 8.5–10.1)
CHLORIDE SERPL-SCNC: 103 MMOL/L (ref 97–108)
CO2 SERPL-SCNC: 26 MMOL/L (ref 21–32)
CREAT SERPL-MCNC: 0.31 MG/DL (ref 0.7–1.3)
DATE LAST DOSE: NORMAL
GLUCOSE SERPL-MCNC: 103 MG/DL (ref 65–100)
POTASSIUM SERPL-SCNC: 4.2 MMOL/L (ref 3.5–5.1)
REPORTED DOSE,DOSE: NORMAL UNITS
REPORTED DOSE/TIME,TMG: NORMAL
SERVICE CMNT-IMP: NORMAL
SODIUM SERPL-SCNC: 136 MMOL/L (ref 136–145)
VANCOMYCIN TROUGH SERPL-MCNC: 9.1 UG/ML (ref 5–10)

## 2022-02-28 PROCEDURE — 99233 SBSQ HOSP IP/OBS HIGH 50: CPT | Performed by: INTERNAL MEDICINE

## 2022-02-28 PROCEDURE — 74011250636 HC RX REV CODE- 250/636: Performed by: INTERNAL MEDICINE

## 2022-02-28 PROCEDURE — 36415 COLL VENOUS BLD VENIPUNCTURE: CPT

## 2022-02-28 PROCEDURE — 74011250637 HC RX REV CODE- 250/637: Performed by: INTERNAL MEDICINE

## 2022-02-28 PROCEDURE — 74011000258 HC RX REV CODE- 258: Performed by: INTERNAL MEDICINE

## 2022-02-28 PROCEDURE — 80202 ASSAY OF VANCOMYCIN: CPT

## 2022-02-28 PROCEDURE — 80048 BASIC METABOLIC PNL TOTAL CA: CPT

## 2022-02-28 PROCEDURE — 2709999900 HC NON-CHARGEABLE SUPPLY

## 2022-02-28 PROCEDURE — 74011000250 HC RX REV CODE- 250: Performed by: INTERNAL MEDICINE

## 2022-02-28 PROCEDURE — 74011250637 HC RX REV CODE- 250/637: Performed by: STUDENT IN AN ORGANIZED HEALTH CARE EDUCATION/TRAINING PROGRAM

## 2022-02-28 PROCEDURE — 65660000000 HC RM CCU STEPDOWN

## 2022-02-28 PROCEDURE — 74011250637 HC RX REV CODE- 250/637: Performed by: EMERGENCY MEDICINE

## 2022-02-28 RX ORDER — OXYCODONE HYDROCHLORIDE 10 MG/1
10 TABLET ORAL
COMMUNITY
End: 2022-03-31 | Stop reason: ALTCHOICE

## 2022-02-28 RX ORDER — HYDROMORPHONE HYDROCHLORIDE 1 MG/ML
1 INJECTION, SOLUTION INTRAMUSCULAR; INTRAVENOUS; SUBCUTANEOUS
Status: DISCONTINUED | OUTPATIENT
Start: 2022-02-28 | End: 2022-03-01

## 2022-02-28 RX ADMIN — ACETAMINOPHEN 325MG 650 MG: 325 TABLET ORAL at 02:45

## 2022-02-28 RX ADMIN — CEFEPIME HYDROCHLORIDE 2 G: 2 INJECTION, POWDER, FOR SOLUTION INTRAVENOUS at 12:45

## 2022-02-28 RX ADMIN — OXYCODONE AND ACETAMINOPHEN 1 TABLET: 7.5; 325 TABLET ORAL at 17:03

## 2022-02-28 RX ADMIN — SODIUM HYPOCHLORITE: 1.25 SOLUTION TOPICAL at 21:38

## 2022-02-28 RX ADMIN — ACETAMINOPHEN 325MG 650 MG: 325 TABLET ORAL at 12:46

## 2022-02-28 RX ADMIN — COLLAGENASE SANTYL: 250 OINTMENT TOPICAL at 21:38

## 2022-02-28 RX ADMIN — OXYCODONE AND ACETAMINOPHEN 1 TABLET: 7.5; 325 TABLET ORAL at 09:44

## 2022-02-28 RX ADMIN — PIPERACILLIN AND TAZOBACTAM 3.38 G: 3; .375 INJECTION, POWDER, LYOPHILIZED, FOR SOLUTION INTRAVENOUS at 04:10

## 2022-02-28 RX ADMIN — HYDROMORPHONE HYDROCHLORIDE 1 MG: 1 INJECTION, SOLUTION INTRAMUSCULAR; INTRAVENOUS; SUBCUTANEOUS at 21:37

## 2022-02-28 RX ADMIN — Medication 1 CAPSULE: at 11:09

## 2022-02-28 RX ADMIN — CEFEPIME HYDROCHLORIDE 2 G: 2 INJECTION, POWDER, FOR SOLUTION INTRAVENOUS at 21:37

## 2022-02-28 RX ADMIN — VANCOMYCIN HYDROCHLORIDE 1250 MG: 10 INJECTION, POWDER, LYOPHILIZED, FOR SOLUTION INTRAVENOUS at 00:56

## 2022-02-28 RX ADMIN — VANCOMYCIN HYDROCHLORIDE 1250 MG: 10 INJECTION, POWDER, LYOPHILIZED, FOR SOLUTION INTRAVENOUS at 18:24

## 2022-02-28 RX ADMIN — ENOXAPARIN SODIUM 40 MG: 100 INJECTION SUBCUTANEOUS at 09:44

## 2022-02-28 RX ADMIN — HYDROMORPHONE HYDROCHLORIDE 0.5 MG: 1 INJECTION, SOLUTION INTRAMUSCULAR; INTRAVENOUS; SUBCUTANEOUS at 00:48

## 2022-02-28 RX ADMIN — SODIUM CHLORIDE, PRESERVATIVE FREE 10 ML: 5 INJECTION INTRAVENOUS at 21:39

## 2022-02-28 RX ADMIN — HYDROMORPHONE HYDROCHLORIDE 1 MG: 1 INJECTION, SOLUTION INTRAMUSCULAR; INTRAVENOUS; SUBCUTANEOUS at 18:24

## 2022-02-28 RX ADMIN — OXYCODONE AND ACETAMINOPHEN 1 TABLET: 7.5; 325 TABLET ORAL at 12:46

## 2022-02-28 RX ADMIN — SODIUM CHLORIDE, PRESERVATIVE FREE 10 ML: 5 INJECTION INTRAVENOUS at 17:03

## 2022-02-28 RX ADMIN — SODIUM CHLORIDE, PRESERVATIVE FREE 10 ML: 5 INJECTION INTRAVENOUS at 06:21

## 2022-02-28 RX ADMIN — VANCOMYCIN HYDROCHLORIDE 1250 MG: 10 INJECTION, POWDER, LYOPHILIZED, FOR SOLUTION INTRAVENOUS at 11:09

## 2022-02-28 RX ADMIN — OXYCODONE AND ACETAMINOPHEN 1 TABLET: 7.5; 325 TABLET ORAL at 02:45

## 2022-02-28 RX ADMIN — HYDROMORPHONE HYDROCHLORIDE 0.5 MG: 1 INJECTION, SOLUTION INTRAMUSCULAR; INTRAVENOUS; SUBCUTANEOUS at 07:48

## 2022-02-28 RX ADMIN — HYDROMORPHONE HYDROCHLORIDE 1 MG: 1 INJECTION, SOLUTION INTRAMUSCULAR; INTRAVENOUS; SUBCUTANEOUS at 14:50

## 2022-02-28 RX ADMIN — HYDROMORPHONE HYDROCHLORIDE 0.5 MG: 1 INJECTION, SOLUTION INTRAMUSCULAR; INTRAVENOUS; SUBCUTANEOUS at 11:08
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
## 2022-02-28 NOTE — PROGRESS NOTES
Bedside and Verbal shift change report given to Moncho Wilson RN (oncoming nurse) by Becca Gusman RN (offgoing nurse). Report included the following information SBAR, Kardex, Intake/Output, MAR and Recent Results.

## 2022-02-28 NOTE — PROGRESS NOTES
ORTHO - Progress Note  Post Op day: 4 Days [address]     [id number]  male    23 y.o.    1995    Admit date:2022  Date of Surgery:2022   Procedures:Procedure(s):  RIGHT HIP IRRIGATION AND DEBRIDEMENT  Surgeon:Surgeon(s) and Role:     * Dina Tadeo, DO - Primary        SUBJECTIVE:     Jud Lora is a 23 y.o. male resting in the bed. Patient has complaints of appropriate post-op pain, tolerating PO pain medications percocet 7.5mg and 1mg Dilaudid. Denies F/C, nausea, vomiting, dizziness, lightheadedness, chest pain, or shortness of breath. OBJECTIVE:       Physical Exam:  General: alert, cooperative, no distress. Gastrointestinal:  non-distended . Cardiovascular: equal pulses in the upper / lower extremities,  Brisk cap refill in all distal extremities   Genitourinary: Pineda  Respiratory: No respiratory distress   Neurological:Neurovascular exam within normal limits. Senstion intact: LE bilat. Motor: + DF/PF/EHL. Musculoskeletal: Andrea's sign negative in bilateral lower extremities. Calves soft, supple, non-tender upon palpation or with passive stretch. Dressing/Wound:  Staples intact. No signs of dehiscence or erythema. Clean, dry and intact. No significant erythema or swelling.     Vital Signs:       Patient Vitals for the past 8 hrs:   BP Pulse Resp SpO2   22 1115 (!) 141/83 (!) 117 18 98 %                                          Temp (24hrs), Av.3 °F (36.8 °C), Min:98.2 °F (36.8 °C), Max:98.3 °F (36.8 °C)      Labs:        Recent Labs     22  0311   HCT 29.1*   HGB 7.9*     PT/OT:              ASSESSMENT / PLAN:   Active Problems:    Sepsis ([address] (2022)         -  Continue PT/OT full ROM right hip   - Continue IV abx  -  Drain removed right hip   -  DVT prophylaxis- SCD w/ Lovenox 40 mg  - 2 week f/u with Dr Tasha Baig from surgery ( 22)  -  DC planning - pending    Signed By: SUYAPA Albarado

## 2022-02-28 NOTE — PROGRESS NOTES
Pharmacy Medication Reconciliation     The patient was interviewed regarding current PTA medication list, use and drug allergies. The patient was questioned regarding use of any other inhalers, topical products, over the counter medications, herbal medications, vitamin products or ophthalmic/nasal/otic medication use. Allergy Update: Patient has no known allergies. Recommendations/Findings: The following amendments were made to the patient's active medication list on file at [facility]:   1) Additions:   - Oxycodone 10 mg q6h as needed     2) Deletions:   - None     3) Changes:   - Reactivated cyclobenzaprine 10 mg daily       Pertinent Findings: None    Clarified PTA med list with patient. PTA medication list was corrected to the following:     Prior to Admission Medications   Prescriptions Last Dose Informant Taking? L.acid,para-B. bifidum-S.therm (RISAQUAD) 8 billion cell cap cap  Self Yes   Sig: Take 1 Capsule by mouth daily for 30 days. ceFAZolin 2 g IV syringe Stopped upon admission    Self Yes   Si g by IntraVENous route every eight (8) hours for 37 days. cyclobenzaprine (FLEXERIL) 10 mg tablet  Self Yes   Sig: Take 10 mg by mouth daily. metoprolol tartrate (LOPRESSOR) 25 mg tablet  Self Yes   Sig: Take 0.5 Tablets by mouth two (2) times a day for 30 days. oxyCODONE IR (ROXICODONE) 10 mg tab immediate release tablet  Self Yes   Sig: Take 10 mg by mouth every six (6) hours as needed for Pain. Facility-Administered Medications: None        Thank you,  Elvin Anne    PharmD.  Candidate   YASMEEN Rogers

## 2022-03-01 LAB
ANION GAP SERPL CALC-SCNC: 4 MMOL/L (ref 5–15)
BUN SERPL-MCNC: 12 MG/DL (ref 6–20)
BUN/CREAT SERPL: 29 (ref 12–20)
CALCIUM SERPL-MCNC: 9 MG/DL (ref 8.5–10.1)
CHLORIDE SERPL-SCNC: 105 MMOL/L (ref 97–108)
CO2 SERPL-SCNC: 25 MMOL/L (ref 21–32)
CREAT SERPL-MCNC: 0.41 MG/DL (ref 0.7–1.3)
CRP SERPL-MCNC: 6.76 MG/DL (ref 0–0.6)
ERYTHROCYTE [SEDIMENTATION RATE] IN BLOOD: 86 MM/HR (ref 0–15)
GLUCOSE SERPL-MCNC: 89 MG/DL (ref 65–100)
POTASSIUM SERPL-SCNC: 4.8 MMOL/L (ref 3.5–5.1)
SODIUM SERPL-SCNC: 134 MMOL/L (ref 136–145)

## 2022-03-01 PROCEDURE — 74011250636 HC RX REV CODE- 250/636: Performed by: INTERNAL MEDICINE

## 2022-03-01 PROCEDURE — 74011250637 HC RX REV CODE- 250/637: Performed by: INTERNAL MEDICINE

## 2022-03-01 PROCEDURE — 74011000250 HC RX REV CODE- 250: Performed by: INTERNAL MEDICINE

## 2022-03-01 PROCEDURE — 74011000258 HC RX REV CODE- 258: Performed by: INTERNAL MEDICINE

## 2022-03-01 PROCEDURE — 80048 BASIC METABOLIC PNL TOTAL CA: CPT

## 2022-03-01 PROCEDURE — 65660000000 HC RM CCU STEPDOWN

## 2022-03-01 PROCEDURE — 2709999900 HC NON-CHARGEABLE SUPPLY

## 2022-03-01 PROCEDURE — 74011250637 HC RX REV CODE- 250/637: Performed by: STUDENT IN AN ORGANIZED HEALTH CARE EDUCATION/TRAINING PROGRAM

## 2022-03-01 PROCEDURE — 99233 SBSQ HOSP IP/OBS HIGH 50: CPT | Performed by: INTERNAL MEDICINE

## 2022-03-01 PROCEDURE — 85652 RBC SED RATE AUTOMATED: CPT

## 2022-03-01 PROCEDURE — 36415 COLL VENOUS BLD VENIPUNCTURE: CPT

## 2022-03-01 PROCEDURE — 77030040393 HC DRSG OPTIFOAM GENT MDII -B

## 2022-03-01 PROCEDURE — 86140 C-REACTIVE PROTEIN: CPT

## 2022-03-01 RX ORDER — HYDROMORPHONE HYDROCHLORIDE 2 MG/1
2 TABLET ORAL
Status: DISCONTINUED | OUTPATIENT
Start: 2022-03-01 | End: 2022-03-05 | Stop reason: HOSPADM

## 2022-03-01 RX ORDER — HYDROMORPHONE HYDROCHLORIDE 1 MG/ML
1 INJECTION, SOLUTION INTRAMUSCULAR; INTRAVENOUS; SUBCUTANEOUS
Status: DISCONTINUED | OUTPATIENT
Start: 2022-03-01 | End: 2022-03-04

## 2022-03-01 RX ORDER — LOPERAMIDE HYDROCHLORIDE 2 MG/1
2 CAPSULE ORAL
Status: DISCONTINUED | OUTPATIENT
Start: 2022-03-01 | End: 2022-03-05 | Stop reason: HOSPADM

## 2022-03-01 RX ADMIN — VANCOMYCIN HYDROCHLORIDE 1250 MG: 10 INJECTION, POWDER, LYOPHILIZED, FOR SOLUTION INTRAVENOUS at 11:20

## 2022-03-01 RX ADMIN — CEFEPIME HYDROCHLORIDE 2 G: 2 INJECTION, POWDER, FOR SOLUTION INTRAVENOUS at 11:21

## 2022-03-01 RX ADMIN — Medication 1 CAPSULE: at 09:55

## 2022-03-01 RX ADMIN — COLLAGENASE SANTYL: 250 OINTMENT TOPICAL at 22:02

## 2022-03-01 RX ADMIN — HYDROMORPHONE HYDROCHLORIDE 1 MG: 1 INJECTION, SOLUTION INTRAMUSCULAR; INTRAVENOUS; SUBCUTANEOUS at 09:44

## 2022-03-01 RX ADMIN — SODIUM CHLORIDE, PRESERVATIVE FREE 10 ML: 5 INJECTION INTRAVENOUS at 22:03

## 2022-03-01 RX ADMIN — HYDROMORPHONE HYDROCHLORIDE 2 MG: 2 TABLET ORAL at 18:55

## 2022-03-01 RX ADMIN — HYDROMORPHONE HYDROCHLORIDE 1 MG: 1 INJECTION, SOLUTION INTRAMUSCULAR; INTRAVENOUS; SUBCUTANEOUS at 22:03

## 2022-03-01 RX ADMIN — SODIUM HYPOCHLORITE: 1.25 SOLUTION TOPICAL at 22:03

## 2022-03-01 RX ADMIN — VANCOMYCIN HYDROCHLORIDE 1250 MG: 10 INJECTION, POWDER, LYOPHILIZED, FOR SOLUTION INTRAVENOUS at 18:59

## 2022-03-01 RX ADMIN — OXYCODONE AND ACETAMINOPHEN 1 TABLET: 7.5; 325 TABLET ORAL at 11:20

## 2022-03-01 RX ADMIN — HYDROMORPHONE HYDROCHLORIDE 2 MG: 2 TABLET ORAL at 14:49

## 2022-03-01 RX ADMIN — LOPERAMIDE HYDROCHLORIDE 2 MG: 2 CAPSULE ORAL at 15:08

## 2022-03-01 RX ADMIN — HYDROMORPHONE HYDROCHLORIDE 1 MG: 1 INJECTION, SOLUTION INTRAMUSCULAR; INTRAVENOUS; SUBCUTANEOUS at 04:25

## 2022-03-01 RX ADMIN — HYDROMORPHONE HYDROCHLORIDE 1 MG: 1 INJECTION, SOLUTION INTRAMUSCULAR; INTRAVENOUS; SUBCUTANEOUS at 12:58

## 2022-03-01 RX ADMIN — SODIUM CHLORIDE, PRESERVATIVE FREE 10 ML: 5 INJECTION INTRAVENOUS at 07:13

## 2022-03-01 RX ADMIN — HYDROMORPHONE HYDROCHLORIDE 1 MG: 1 INJECTION, SOLUTION INTRAMUSCULAR; INTRAVENOUS; SUBCUTANEOUS at 16:16

## 2022-03-01 RX ADMIN — ENOXAPARIN SODIUM 40 MG: 100 INJECTION SUBCUTANEOUS at 09:50

## 2022-03-01 RX ADMIN — SODIUM CHLORIDE, PRESERVATIVE FREE 10 ML: 5 INJECTION INTRAVENOUS at 14:50

## 2022-03-01 RX ADMIN — CEFEPIME HYDROCHLORIDE 2 G: 2 INJECTION, POWDER, FOR SOLUTION INTRAVENOUS at 04:26

## 2022-03-01 RX ADMIN — CEFEPIME HYDROCHLORIDE 2 G: 2 INJECTION, POWDER, FOR SOLUTION INTRAVENOUS at 22:02

## 2022-03-01 RX ADMIN — VANCOMYCIN HYDROCHLORIDE 1250 MG: 10 INJECTION, POWDER, LYOPHILIZED, FOR SOLUTION INTRAVENOUS at 02:41
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
## 2022-03-01 NOTE — PROGRESS NOTES
Transition of Care Plan:     RUR: 17% moderate  Disposition: TBD - Home health vs LTC  Follow up appointments: PCP, Specialists  DME needed: Pt has a wheelchair  Transportation at Discharge: BLS transport  [address] or means to access home: N/A  IM Medicare Letter: N/A  Is patient a BCPI-A Bundle:   No                   If yes, was Bundle Letter given?:    Is patient a [place] and connected with the [address]? No          If yes, was [place] transfer form completed and [facility] notified? Caregiver Contact: Janessa Peguero ([phone])  Discharge Caregiver contacted prior to discharge? Pt will contact cg prior to d/c  Care Conference needed?: Not indicated at this time    11:17 AM  CM contacted pt's insurance to confirm if he has transportation benefit through his Medicaid. [employer] representative reports pt has had their insurance since 2018 and has full transportation benefits since then. CM to update pt.    9:49 AM  CM reviewed pt's chart. MD is going to put in a palliative consult. CM met with pt yesterday at the bedside and he reports his plan is to return to his sister home at d/c. CM explained that he will likely need IV ABX at d/c and . He voiced understanding and reports he will be able to handle it at home. CM will continue to follow for discharge planning while patient is admitted on current unit. Please contact this CM with questions or issues related to discharge.      HIEN Winchester  Care Manager [id number]  [phone]

## 2022-03-01 NOTE — CONSULTS
Palliative Medicine  [phone]    Mr Corona Bales is not on chronic opioids at baseline  In a review of his , he has only had two scripts in the last year, both when discharged from here  1/10/22- Oxycodone 5mg, 15 tabs prescribed by Dr Ramonita Crocker after being discharged from the ED for leg pain  2/2/22- Oxycodone/APAP 10-325mg, 20 tabs prescribed by Dr Marie Betancur when he was discharged from this hospital after being admitted and treated for bacteremia causing severe sepsis    Given that he does not have a history of chronic opioid use, or a reliable prescriber,  He absolutely needs to be set up with a pain doc for further management, as I suspect this will be an ongoing issue for him. At a minimum he will need a long term weaning schedule with someone who can manage that on the outpatient side. This will need to be set up by the primary team/Case Management. I will talk to the attending prior to adjusting his medications, because my role in his care is only while he is inpatient at [facility]    In the meantime, would start with educating him on more regular dosing of his PO option. Would strongly encourage him to reserve the IV option for breakthrough pain after PO has been utilized- he will get faster relief from the IV option, but more continuous relief from PO if he stays on top of it.     Our  is going to meet with him today as well    Joseline Olivares NP

## 2022-03-01 NOTE — WOUND CARE
Wound care nurse consult: Ortho PA re-consulted WC nurse for right ischial wound care. Patient is POD#5 from right hip arthrotomy with drainage and excisional debridement by Dr Carlos Gr. Yue DALE removed hemovac from right hip yesterday and staples in place. Incision is separate from right ischial wound. Right ischial wound: q-tip in tunnel towards right hip. Sacral:      Right ischial:      Patient had a large BM prior to dressing change - very soft/mushy and brown, not diarrhea. Stool easily contaminates the right ischial wound due to it's close proximity to anus.     Wound Sacral/coccyx Posterior;Mid 3 open stage 4 wounds to sacral/coccyx (Active)   Wound Image   03/01/22 1219   Wound Etiology Pressure Stage 4 03/01/22 1219   Dressing Status New dressing applied 03/01/22 1219   Cleansed Wound cleanser 03/01/22 1219   Dressing/Treatment Packing;Moist to dry 03/01/22 1219   Wound Length (cm) 6 cm 02/23/22 1451   Wound Width (cm) 6 cm 02/23/22 1451   Wound Depth (cm) 2 cm 02/23/22 1451   Wound Surface Area (cm^2) 36 cm^2 02/23/22 1451   Change in Wound Size % -9.09 02/23/22 1451   Wound Volume (cm^3) 72 cm^3 02/23/22 1451   Wound Healing % -9 02/23/22 1451   Wound Assessment Pale granulation tissue 03/01/22 1219   Drainage Amount Small 03/01/22 1219   Drainage Description Serosanguinous 03/01/22 1219   Wound Odor None 03/01/22 1219   Krista-Wound/Incision Assessment Intact 03/01/22 1219   Edges Defined edges 03/01/22 1219   Wound Thickness Description Full thickness 03/01/22 1219   Number of days: 738       Wound Ischial Right (Active)   Wound Image   03/01/22 1219   Wound Etiology Pressure Stage 4 03/01/22 1219   Dressing Status New dressing applied 03/01/22 1219   Cleansed Wound cleanser 03/01/22 1219   Dressing/Treatment Packing;Moist to dry 03/01/22 1219   Wound Length (cm) 16 cm 02/23/22 1451   Wound Width (cm) 11 cm 02/23/22 1451   Wound Depth (cm) 13 cm 02/23/22 1451   Wound Surface Area (cm^2) 176 cm^2 02/23/22 1451   Change in Wound Size % -6.67 02/23/22 1451   Wound Volume (cm^3) 2288 cm^3 02/23/22 1451   Wound Healing % -177 02/23/22 1451   Distance Tunneling (cm) 10 cm 03/01/22 1219   Direction of Tunnel 12 o'clock 03/01/22 1219   Wound Assessment Pink/red 03/01/22 1219   Drainage Amount Moderate 03/01/22 1219   Drainage Description Serosanguinous 03/01/22 1219   Wound Odor None 03/01/22 1219   Krista-Wound/Incision Assessment Intact 03/01/22 1219   Edges Defined edges 03/01/22 1219   Wound Thickness Description Full thickness 03/01/22 1219   Number of days: 34       Wound Buttocks Left (Active)   Wound Image   03/01/22 1219   Wound Etiology Pressure Stage 4 03/01/22 1219   Dressing Status New dressing applied 03/01/22 1219   Cleansed Wound cleanser 03/01/22 1219   Dressing/Treatment Packing;Moist to dry 03/01/22 1219   Wound Length (cm) 6 cm 02/23/22 1451   Wound Width (cm) 7 cm 02/23/22 1451   Wound Depth (cm) 1.5 cm 02/23/22 1451   Wound Surface Area (cm^2) 42 cm^2 02/23/22 1451   Change in Wound Size % -75 02/23/22 1451   Wound Volume (cm^3) 63 cm^3 02/23/22 1451   Wound Healing % -75 02/23/22 1451   Wound Assessment Pale granulation tissue 03/01/22 1219   Drainage Amount Small 03/01/22 1219   Drainage Description Serosanguinous 03/01/22 1219   Wound Odor None 03/01/22 1219   Krista-Wound/Incision Assessment Intact 03/01/22 1219   Edges Defined edges 03/01/22 1219   Wound Thickness Description Full thickness 03/01/22 1219   Number of days: 34       Wound Hip Right; Outer (Active)   Wound Etiology Surgical 03/01/22 1219   Dressing Status New dressing applied 03/01/22 1219   Dressing/Treatment Foam 03/01/22 1219   Wound Assessment Other (Comment) 03/01/22 1219   Drainage Amount None 02/27/22 1950   Wound Odor None 02/27/22 1950   Number of days: 5      Daily wound care to continue by cleansing wounds with 1/4 strength Dakins due to fecal contamination, apply Santyl to each wound and then pack each wound with NS moist kerlix packing moist to dry. Make sure to pack tunnel in right ischial/buttock wound completely before packing the rest of the wound.     Yasmeen Marie RN, [facility]
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
## 2022-03-01 NOTE — CONSULTS
Palliative Medicine  [phone]    Recommendations:    After talking to the attending, given Mr An Range complicated social issues, and no clear avenue for follow up, I am just going to give recs for now, but please reach out to me if his pain is not controlled and more aggressive management is needed    Consider an opioid rotation  Can try switching from Percocet 7.5/325mg to Hydromorphone 2mg PO every 4 hours    Education will be key- he needs to stay on top of his every 4 hour dosing, and only utilize the IV option for pain not controlled by PO. His pain is both chronic baseline pain, with incidental extreme pain which is much harder to control because you cannot capture it. His best bet is to have more consistent pain control which he will get with PO rather than IV    I would only give this another 24 hours to see if he is more comfortable- if not, will likely need to be more aggressive, but understanding that without follow up outpatient, we will not have a discharge plan.       Please call me tomorrow if this is not working    Gio Maier NP
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
## 2022-03-02 ENCOUNTER — TELEPHONE (OUTPATIENT)
Dept: INFECTIOUS DISEASES | Age: 27
End: 2022-03-02

## 2022-03-02 LAB
ANION GAP SERPL CALC-SCNC: 5 MMOL/L (ref 5–15)
BACTERIA SPEC CULT: ABNORMAL
BACTERIA SPEC CULT: ABNORMAL
BACTERIA SPEC CULT: NORMAL
BUN SERPL-MCNC: 10 MG/DL (ref 6–20)
BUN/CREAT SERPL: 36 (ref 12–20)
CALCIUM SERPL-MCNC: 8.9 MG/DL (ref 8.5–10.1)
CHLORIDE SERPL-SCNC: 107 MMOL/L (ref 97–108)
CO2 SERPL-SCNC: 24 MMOL/L (ref 21–32)
CREAT SERPL-MCNC: 0.28 MG/DL (ref 0.7–1.3)
GLUCOSE SERPL-MCNC: 105 MG/DL (ref 65–100)
GRAM STN SPEC: ABNORMAL
GRAM STN SPEC: ABNORMAL
POTASSIUM SERPL-SCNC: 4.6 MMOL/L (ref 3.5–5.1)
SERVICE CMNT-IMP: ABNORMAL
SERVICE CMNT-IMP: NORMAL
SODIUM SERPL-SCNC: 136 MMOL/L (ref 136–145)
VANCOMYCIN SERPL-MCNC: 11 UG/ML

## 2022-03-02 PROCEDURE — 74011250636 HC RX REV CODE- 250/636: Performed by: INTERNAL MEDICINE

## 2022-03-02 PROCEDURE — 65660000000 HC RM CCU STEPDOWN

## 2022-03-02 PROCEDURE — 99233 SBSQ HOSP IP/OBS HIGH 50: CPT | Performed by: INTERNAL MEDICINE

## 2022-03-02 PROCEDURE — 36415 COLL VENOUS BLD VENIPUNCTURE: CPT

## 2022-03-02 PROCEDURE — 74011250637 HC RX REV CODE- 250/637: Performed by: INTERNAL MEDICINE

## 2022-03-02 PROCEDURE — 74011000250 HC RX REV CODE- 250: Performed by: INTERNAL MEDICINE

## 2022-03-02 PROCEDURE — 80048 BASIC METABOLIC PNL TOTAL CA: CPT

## 2022-03-02 PROCEDURE — 74011000258 HC RX REV CODE- 258: Performed by: INTERNAL MEDICINE

## 2022-03-02 PROCEDURE — 80202 ASSAY OF VANCOMYCIN: CPT

## 2022-03-02 RX ADMIN — CEFEPIME HYDROCHLORIDE 2 G: 2 INJECTION, POWDER, FOR SOLUTION INTRAVENOUS at 15:42

## 2022-03-02 RX ADMIN — SODIUM CHLORIDE, PRESERVATIVE FREE 10 ML: 5 INJECTION INTRAVENOUS at 15:42

## 2022-03-02 RX ADMIN — SODIUM CHLORIDE, PRESERVATIVE FREE 10 ML: 5 INJECTION INTRAVENOUS at 23:22

## 2022-03-02 RX ADMIN — CEFEPIME HYDROCHLORIDE 2 G: 2 INJECTION, POWDER, FOR SOLUTION INTRAVENOUS at 04:39

## 2022-03-02 RX ADMIN — VANCOMYCIN HYDROCHLORIDE 1250 MG: 10 INJECTION, POWDER, LYOPHILIZED, FOR SOLUTION INTRAVENOUS at 01:24

## 2022-03-02 RX ADMIN — HYDROMORPHONE HYDROCHLORIDE 1 MG: 1 INJECTION, SOLUTION INTRAMUSCULAR; INTRAVENOUS; SUBCUTANEOUS at 13:22

## 2022-03-02 RX ADMIN — HYDROMORPHONE HYDROCHLORIDE 1 MG: 1 INJECTION, SOLUTION INTRAMUSCULAR; INTRAVENOUS; SUBCUTANEOUS at 18:28

## 2022-03-02 RX ADMIN — HYDROMORPHONE HYDROCHLORIDE 1 MG: 1 INJECTION, SOLUTION INTRAMUSCULAR; INTRAVENOUS; SUBCUTANEOUS at 23:22

## 2022-03-02 RX ADMIN — HYDROMORPHONE HYDROCHLORIDE 2 MG: 2 TABLET ORAL at 16:05

## 2022-03-02 RX ADMIN — LOPERAMIDE HYDROCHLORIDE 2 MG: 2 CAPSULE ORAL at 10:01

## 2022-03-02 RX ADMIN — HYDROMORPHONE HYDROCHLORIDE 1 MG: 1 INJECTION, SOLUTION INTRAMUSCULAR; INTRAVENOUS; SUBCUTANEOUS at 09:33

## 2022-03-02 RX ADMIN — CEFEPIME HYDROCHLORIDE 2 G: 2 INJECTION, POWDER, FOR SOLUTION INTRAVENOUS at 23:20

## 2022-03-02 RX ADMIN — HYDROMORPHONE HYDROCHLORIDE 2 MG: 2 TABLET ORAL at 01:11

## 2022-03-02 RX ADMIN — SODIUM HYPOCHLORITE: 1.25 SOLUTION TOPICAL at 23:21

## 2022-03-02 RX ADMIN — COLLAGENASE SANTYL: 250 OINTMENT TOPICAL at 23:21

## 2022-03-02 RX ADMIN — VANCOMYCIN HYDROCHLORIDE 1250 MG: 10 INJECTION, POWDER, LYOPHILIZED, FOR SOLUTION INTRAVENOUS at 13:22

## 2022-03-02 RX ADMIN — SODIUM CHLORIDE, PRESERVATIVE FREE 10 ML: 5 INJECTION INTRAVENOUS at 05:24

## 2022-03-02 RX ADMIN — HYDROMORPHONE HYDROCHLORIDE 1 MG: 1 INJECTION, SOLUTION INTRAMUSCULAR; INTRAVENOUS; SUBCUTANEOUS at 05:24

## 2022-03-02 RX ADMIN — ENOXAPARIN SODIUM 40 MG: 100 INJECTION SUBCUTANEOUS at 09:32

## 2022-03-02 RX ADMIN — Medication 1 CAPSULE: at 09:32

## 2022-03-02 NOTE — PROGRESS NOTES
2100: Bedside shift change report given to Todd Linares RN (oncoming nurse) by Alfredo Baker RN (offgoing nurse). Report included the following information SBAR and Kardex. 2330: Patient being uncooperative and demanding to leave. Educated patient on why it is necessary for him to stay in the hospital and he said \"you can't keep me here. \" Keegan Richelle, NP regarding patient leaving AMA. 2350: NP at bedside to explain to patient why he needs to stay in the hospital for treatment. Patient stated \"no one brings me my pain medicine when I need it. \" IV dilaudid was given at 2200 and patient refusing PO dilaudid. NP told this RN to confirm patient had a family member to pick him up and have him sign an AMA form. 0010: Asked patient for family member's phone number to confirm means of leaving the hospital and patient states \"I'm just gonna stay until morning. \" He again asked for pain medication and this RN explained it is not time for the IV dilaudid, offered the PO dilaudid once again and patient continues to refuse.
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
## 2022-03-02 NOTE — TELEPHONE ENCOUNTER
Please add patient to ID virtual clinic schedule as follows  -ID virtual clinic follow-up on 3/22 at 2:30 PM

## 2022-03-02 NOTE — PROGRESS NOTES
Bedside and Verbal shift change report given to Hillary Lennon RN (oncoming nurse) by Penelope Carlisle RN (offgoing nurse). Report included the following information SBAR, Kardex, Intake/Output, MAR and Recent Results.
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
## 2022-03-02 NOTE — PROGRESS NOTES
Transition of Care Plan:     RUR: 17% moderate  Disposition: TBD - Home health vs LTC  Follow up appointments: PCP, Specialists  DME needed: Pt has a wheelchair  Transportation at [address] or means to access home: N/A   Medicare Letter: N/A  Is patient a BCPI-A Bundle:   No                   If yes, was Bundle Letter given?:    Is patient a [place] and connected with the [facility]?      No          If yes, was [place] transfer form completed and [facility] notified? Caregiver Contact: AuntVon ([phone])  Discharge Caregiver contacted prior to discharge? Pt will contact cg prior to d/c  Care Conference needed?: Not indicated at this time    CM met with pt at the bedside. He reports his sister can come to the hospital to be present during IV ABX teaching. CM to arrange [facility] and send referrals to infusion company for d/c planning. CM will continue to follow for discharge planning while patient is admitted on current unit. Please contact this CM with questions or issues related to discharge.      HIEN Melton  Care Manager [facility]  [phone]

## 2022-03-03 PROCEDURE — 74011250637 HC RX REV CODE- 250/637: Performed by: INTERNAL MEDICINE

## 2022-03-03 PROCEDURE — 74011250637 HC RX REV CODE- 250/637: Performed by: EMERGENCY MEDICINE

## 2022-03-03 PROCEDURE — 65660000000 HC RM CCU STEPDOWN

## 2022-03-03 PROCEDURE — 74011250636 HC RX REV CODE- 250/636: Performed by: INTERNAL MEDICINE

## 2022-03-03 PROCEDURE — 74011000250 HC RX REV CODE- 250: Performed by: INTERNAL MEDICINE

## 2022-03-03 PROCEDURE — 99233 SBSQ HOSP IP/OBS HIGH 50: CPT | Performed by: INTERNAL MEDICINE

## 2022-03-03 PROCEDURE — 74011000258 HC RX REV CODE- 258: Performed by: INTERNAL MEDICINE

## 2022-03-03 RX ADMIN — CEFEPIME HYDROCHLORIDE 2 G: 2 INJECTION, POWDER, FOR SOLUTION INTRAVENOUS at 11:37

## 2022-03-03 RX ADMIN — CEFEPIME HYDROCHLORIDE 2 G: 2 INJECTION, POWDER, FOR SOLUTION INTRAVENOUS at 03:34

## 2022-03-03 RX ADMIN — CEFEPIME HYDROCHLORIDE 2 G: 2 INJECTION, POWDER, FOR SOLUTION INTRAVENOUS at 22:03

## 2022-03-03 RX ADMIN — Medication 1 CAPSULE: at 10:42

## 2022-03-03 RX ADMIN — HYDROMORPHONE HYDROCHLORIDE 1 MG: 1 INJECTION, SOLUTION INTRAMUSCULAR; INTRAVENOUS; SUBCUTANEOUS at 14:47

## 2022-03-03 RX ADMIN — HYDROMORPHONE HYDROCHLORIDE 1 MG: 1 INJECTION, SOLUTION INTRAMUSCULAR; INTRAVENOUS; SUBCUTANEOUS at 20:14

## 2022-03-03 RX ADMIN — LOPERAMIDE HYDROCHLORIDE 2 MG: 2 CAPSULE ORAL at 10:46

## 2022-03-03 RX ADMIN — ENOXAPARIN SODIUM 40 MG: 100 INJECTION SUBCUTANEOUS at 10:42

## 2022-03-03 RX ADMIN — LOPERAMIDE HYDROCHLORIDE 2 MG: 2 CAPSULE ORAL at 06:19

## 2022-03-03 RX ADMIN — SODIUM CHLORIDE, PRESERVATIVE FREE 10 ML: 5 INJECTION INTRAVENOUS at 22:03

## 2022-03-03 RX ADMIN — ACETAMINOPHEN 325MG 650 MG: 325 TABLET ORAL at 16:46

## 2022-03-03 RX ADMIN — HYDROMORPHONE HYDROCHLORIDE 1 MG: 1 INJECTION, SOLUTION INTRAMUSCULAR; INTRAVENOUS; SUBCUTANEOUS at 10:40

## 2022-03-03 RX ADMIN — HYDROMORPHONE HYDROCHLORIDE 1 MG: 1 INJECTION, SOLUTION INTRAMUSCULAR; INTRAVENOUS; SUBCUTANEOUS at 03:34

## 2022-03-03 RX ADMIN — COLLAGENASE SANTYL: 250 OINTMENT TOPICAL at 22:03

## 2022-03-03 RX ADMIN — ACETAMINOPHEN 325MG 650 MG: 325 TABLET ORAL at 11:37

## 2022-03-03 RX ADMIN — SODIUM HYPOCHLORITE: 1.25 SOLUTION TOPICAL at 22:03

## 2022-03-03 RX ADMIN — SODIUM CHLORIDE, PRESERVATIVE FREE 10 ML: 5 INJECTION INTRAVENOUS at 14:49

## 2022-03-03 RX ADMIN — SODIUM CHLORIDE, PRESERVATIVE FREE 10 ML: 5 INJECTION INTRAVENOUS at 06:19
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
## 2022-03-03 NOTE — PROGRESS NOTES
Transition of Care Plan:     RUR: 15% moderate  Disposition: TBD - Home health & IV ABX vs [facility] transfer  Follow up appointments: PCP, Specialists  DME needed: Pt has a wheelchair  Transportation at [address] or means to access home: N/A   Medicare Letter: N/A  Is patient a BCPI-A Bundle:   No                   If yes, was Bundle Letter given?:    Is patient a [place] and connected with [facility]?      No          If yes, was [employer] transfer form completed and [facility] notified? Caregiver Contact: AuntBell ([phone])  Discharge Caregiver contacted prior to discharge? Pt will contact cg prior to d/c  Care Conference needed?: Not indicated at this time    3:50 PM  Pt has went back and forth several times today about his d/c plan. At this time he is unsure if he can d/c to his sister's home and if she will assist with his care needs. Referral has been sent to [facility] for a possible transfer there for IV ABX and wound care. 12:20 PM  CM contacted pt's sister [facility] [phone] to discuss d/c plan. Her phone disconnected prior to CM being able to explain d/c plan. CM met with patient at the bedside and he reports that her phone may have  and that she is planning to come to the hospital. He reports he will tell her to call this CM prior to coming to this hospital.    11:45 AM  [address] [facility] [address] (address pt will d/c to). 9:46 AM  CM sent IV ABX recs to [facility] Brij[facility] [place] [address] so they can run it through insurance. [facility] to [facility] for wound care and IV ABX.     HIEN eMjia  Care Manager [facility]  [phone]

## 2022-03-03 NOTE — CONSULTS
Lindsay Martinez   89 y.o.  female    LOS: 0 days   Patient Care Team:  Ronda Centeno MD as PCP - General (Family Medicine)      Subjective     Chief Complaint: Brought to the emergency room by the granddaughter for altered mental status, hallucination and confusion  associated with shortness of breath    History of Present Illness:  Patient is a 89-year-old female with history of alzheimers dementia, coronary artery disease with nstemi 3/2018, hypertension and dyslipidemia as well as chronic venous insufficiency apparently had a permanent pacemaker placement about a week ago at [facility]. She had nstemi treated medically 3/2018 2/2 advanced age and dementia.      3/26/2018 DUPLEX US CAROTID, BILATERAL Conclusions: Bilateral carotid artery stenosis in the range of 50-69%. Bilateral vertebral arteries with antegrade flow.    ECHO: March 26, 2018:  EF 61%, moderate to severe left atrial enlargement, mild to moderate mitral stenosis,      Past Medical History:   Diagnosis Date   • Altered mental state    • Chronic venous insufficiency    • Encephalopathy acute    • Hyperlipidemia    • Hypertension    • Late onset Alzheimer's disease without behavioral disturbance (CMS/HCC)      Past Surgical History:   Procedure Laterality Date   • EYE SURGERY     • HERNIA REPAIR     • HYSTERECTOMY     • REFRACTIVE SURGERY Right    • THYROID SURGERY  2018     Medications Prior to Admission   Medication Sig Dispense Refill Last Dose   • aspirin 81 MG EC tablet Take 81 mg by mouth Daily.   10/1/2019 at Unknown time   • donepezil (ARICEPT) 5 MG tablet Take 5 mg by mouth Daily.  0 9/30/2019 at Unknown time   • ferrous sulfate (IRON SUPPLEMENT) 325 (65 FE) MG tablet Take 1 tablet by mouth Daily With Breakfast. (Patient taking differently: Take 325 mg by mouth Daily With Breakfast. Filled for 30 days on 8/19. No refills given until patient is seen by her physician.) 30 tablet 0 Past Month at Unknown time   • furosemide (LASIX) 20 MG  Palliative Medicine  [phone]    Signing off  Please call us if we can help, understanding that he will still need follow up at discharge which may be a barrier to our effective involvement    Casey Villegas NP tablet Take 1 tablet by mouth Daily. (Patient taking differently: Take 20 mg by mouth Daily. 7 AM and 2 PM)   10/1/2019 at Unknown time   • magnesium oxide (MAGOX) 400 (241.3 Mg) MG tablet tablet Take 400 mg by mouth 2 (Two) Times a Day.  0 10/1/2019 at Unknown time   • melatonin 5 MG tablet tablet Take 10 mg by mouth At Night As Needed.   9/30/2019 at Unknown time   • metoprolol succinate XL (TOPROL-XL) 25 MG 24 hr tablet Take 12.5 mg by mouth Daily.   10/1/2019 at Unknown time   • midodrine (PROAMATINE) 10 MG tablet Take 10 mg by mouth 3 (Three) Times a Day. Do not give after 6PM   10/1/2019 at Unknown time   • mirtazapine (REMERON) 7.5 MG tablet Take 7.5 mg by mouth Every Night.  0 9/30/2019 at Unknown time   • potassium chloride (K-DUR,KLOR-CON) 20 MEQ CR tablet Take 20 mEq by mouth Daily.  0 10/1/2019 at Unknown time   • simvastatin (ZOCOR) 20 MG tablet Take 20 mg by mouth Every Night.   9/30/2019 at Unknown time       Family History   Problem Relation Age of Onset   • Diabetes Mother    • Hypertension Mother    • Heart disease Mother    • Cancer Father    • Cancer Daughter      Social History     Socioeconomic History   • Marital status:      Spouse name: Not on file   • Number of children: Not on file   • Years of education: Not on file   • Highest education level: Not on file   Tobacco Use   • Smoking status: Never Smoker   • Smokeless tobacco: Never Used   Substance and Sexual Activity   • Alcohol use: No     Frequency: Never   • Drug use: No   • Sexual activity: Defer     Objective       Review of Systems:   Unable to obatin 2/2 confused to place time surroundings and inappropriate        Current Facility-Administered Medications:   •  acetaminophen (TYLENOL) tablet 650 mg, 650 mg, Oral, Q4H PRN **OR** acetaminophen (TYLENOL) 160 MG/5ML solution 650 mg, 650 mg, Oral, Q4H PRN **OR** acetaminophen (TYLENOL) suppository 650 mg, 650 mg, Rectal, Q4H PRN, Rosa Bobo MD  •  aspirin EC tablet 81 mg, 81  mg, Oral, Daily, Rosa Bobo MD, 81 mg at 10/02/19 0817  •  atorvastatin (LIPITOR) tablet 10 mg, 10 mg, Oral, Daily, Rosa Bobo MD, 10 mg at 10/02/19 0817  •  dextrose (D50W) 25 g/ 50mL Intravenous Solution 25 g, 25 g, Intravenous, Q15 Min PRN, Rosa Bobo MD  •  dextrose (GLUTOSE) oral gel 15 g, 15 g, Oral, Q15 Min PRN, Rosa Bobo MD  •  donepezil (ARICEPT) tablet 5 mg, 5 mg, Oral, Daily, Rosa Bobo MD, 5 mg at 10/02/19 0817  •  enoxaparin (LOVENOX) syringe 40 mg, 40 mg, Subcutaneous, Q24H, Rosa Bobo MD, 40 mg at 10/01/19 2145  •  ferrous sulfate tablet 325 mg, 325 mg, Oral, Daily With Breakfast, Rosa Bobo MD, 325 mg at 10/02/19 0817  •  furosemide (LASIX) injection 40 mg, 40 mg, Intravenous, BID, Rosa Bobo MD, 40 mg at 10/02/19 0818  •  glucagon (human recombinant) (GLUCAGEN DIAGNOSTIC) injection 1 mg, 1 mg, Subcutaneous, Q15 Min PRN, Rosa Bobo MD  •  insulin lispro (humaLOG) injection 0-9 Units, 0-9 Units, Subcutaneous, 4x Daily With Meals & Nightly, Rosa Bobo MD  •  magnesium oxide (MAG-OX) tablet 400 mg, 400 mg, Oral, BID, Rosa Bobo MD, 400 mg at 10/02/19 0817  •  metoprolol succinate XL (TOPROL-XL) 24 hr tablet 12.5 mg, 12.5 mg, Oral, Daily, Rosa Bobo MD, 12.5 mg at 10/02/19 0817  •  midodrine (PROAMATINE) tablet 10 mg, 10 mg, Oral, TID, Rosa Bobo MD, 10 mg at 10/02/19 0817  •  mirtazapine (REMERON) tablet 7.5 mg, 7.5 mg, Oral, Nightly, Rosa Bobo MD, 7.5 mg at 10/01/19 2210  •  nitroglycerin (NITROSTAT) SL tablet 0.4 mg, 0.4 mg, Sublingual, Q5 Min PRN, Rosa Bobo MD  •  ondansetron (ZOFRAN) injection 4 mg, 4 mg, Intravenous, Q6H PRN, Rosa Bobo MD  •  potassium chloride (KLOR-CON) packet 20 mEq, 20 mEq, Oral, Daily, Rosa Bobo MD, 20 mEq at 10/02/19 0817  •  [COMPLETED] Insert peripheral IV, , , Once **AND** sodium chloride 0.9 % flush 10 mL, 10 mL, Intravenous, PRN, Sophia Hill, APRN  •  sodium chloride 0.9 % flush 10 mL, 10 mL, Intravenous,  Q12H, Rosa Bobo MD, 10 mL at 10/02/19 0821  •  sodium chloride 0.9 % flush 10 mL, 10 mL, Intravenous, PRN, Rosa Bobo MD      Physical Exam:   Vital Sign Min/Max for last 24 hours  Temp  Min: 97.8 °F (36.6 °C)  Max: 98.9 °F (37.2 °C)   BP  Min: 145/63  Max: 171/85    Pulse  Min: 60  Max: 86     Wt Readings from Last 3 Encounters:   10/02/19 61.6 kg (135 lb 12.5 oz)   08/19/19 59.9 kg (132 lb)   07/30/19 62.2 kg (137 lb 3.2 oz)       General Appearance:   Awake,  Alert, cooperative, in no acute distress   Head:    Normocephalic, without obvious abnormality, atraumatic   Eyes:            Conjunctivae normal, non icteric, eom intact      Neck:   No adenopathy, supple, trachea midline, no thyromegaly, no   carotid bruit, no JVD, no elevated cvp   Lungs:     Clear to auscultation,respirations regular, even and                  unlabored    Heart:    Regular rhythm and normal rate, normal S1 and S2,            No murmur, no gallop, no rub, no click    Chest Wall:    No abnormalities observed   Abdomen:     Normal bowel sounds, no masses, soft non-tender, non-distended,                    Rectal:     Deferred   Extremities:   No edema. Moves all extremities well, no cyanosis, no erythema   Pulses:   Pulses palpable and equal bilaterally   Skin:   No bleeding, bruising or rash   Neurologic:   Speech clear, orinted to person only and confused to place tie and surroundings, no facial drooping     : voids      MONITOR: atrial pacing    Results Review:     Sodium Sodium   Date Value Ref Range Status   10/02/2019 142 136 - 145 mmol/L Final   10/01/2019 142 136 - 145 mmol/L Final      Potassium Potassium   Date Value Ref Range Status   10/02/2019 3.7 3.5 - 5.2 mmol/L Final   10/01/2019 4.6 3.5 - 5.2 mmol/L Final      Chloride Chloride   Date Value Ref Range Status   10/02/2019 101 98 - 107 mmol/L Final   10/01/2019 104 98 - 107 mmol/L Final      Bicarbonate No results found for: PLASMABICARB   BUN BUN   Date Value Ref  Range Status   10/02/2019 23 8 - 23 mg/dL Final   10/01/2019 21 8 - 23 mg/dL Final      Creatinine Creatinine   Date Value Ref Range Status   10/02/2019 1.19 (H) 0.57 - 1.00 mg/dL Final   10/01/2019 1.11 (H) 0.57 - 1.00 mg/dL Final      Calcium Calcium   Date Value Ref Range Status   10/02/2019 9.1 8.6 - 10.5 mg/dL Final   10/01/2019 8.8 8.6 - 10.5 mg/dL Final      Magnesium No results found for: MG     Results from last 7 days   Lab Units 10/02/19  0655   WBC 10*3/mm3 4.87   HEMOGLOBIN g/dL 11.4*   HEMATOCRIT % 36.2   PLATELETS 10*3/mm3 211     Lab Results   Lab Value Date/Time    TROPONINT <0.010 10/01/2019 1403     No results found for: CHOL  Lab Results   Component Value Date    HDL 58 07/30/2019    HDL 43 (L) 03/26/2018     Lab Results   Component Value Date    LDL 89 07/30/2019    LDL 65 03/26/2018     Lab Results   Component Value Date    TRIG 123 07/30/2019    TRIG 56 03/26/2018     No components found for: CHOLHDL  No results found for: PTT  No components found for: PT/INR  Lab Results   Component Value Date    HGBA1C 5.40 10/02/2019      Lab Results   Component Value Date    TSH 0.577 03/27/2018        Echo EF Estimated  )No results found for: ECHOEFEST      Assessment/ Plan    Active Hospital Problems    Diagnosis POA   • **Acute on chronic combined systolic and diastolic CHF (congestive heart failure) (CMS/[facility]) [I50.43] Yes     Priority: Low   • Hyperparathyroidism (CMS/[facility]) [E21.3] Yes     Priority: Low   • Type 2 diabetes mellitus without complication, without long-term current use of insulin (CMS/[facility]) [E11.9] Yes     Priority: Low   • Chronic venous insufficiency [I87.2] Yes     Priority: Low   • HTN (hypertension) [I10] Yes     Priority: Low       Acute diastolic chf  ECHO: March 26, 2018: at [facility]  EF 61%, moderate to severe left atrial enlargement, mild to moderate mitral stenosis,     Dementia    H/o htn- on proamatine now    Dyslipidemia- low dose statin, h/o elevated  cpk    Plan  recommend conservative tx  Change to oral diuretic, appears euvolemic    Tri Valentino, APRN  10/02/19  9:46 AM    Discussed with  Time:  1 hr with review of records    Shar Christiansen M.D.   Reviewed our cardiology group Nurse Practitioner's note.    Pt interviewed and examined.   Findings verified.   Reviewed and agree with corrections or modifications of history, physical, and plans as indicated:   Blood pressures a little bit higher, and midodrine was stopped.  Agree with this change.      Okay to use oral diuretics.  Stable grade 2/6 TR murmur.  Permanent pacemaker in place.  I reviewed the chest x-ray, and the right pleural effusion is at least moderate in size, and may benefit from thoracentesis.  From a cardiac perspective she has a good and stable exam without any evidence of rales, ascites, or edema.  Most fluid in this episode of acute on chronic diastolic congestive failure is localized in the lung.      Her weight is approximately the same as it has been during most of this year, so I do not think she is holding significant fluid and I think the oral diuretics are fine.  Would defer to Dr. Griffin, and perhaps pulmonary regarding thoracentesis.  Wonder about overnight oximetry.  Granddaughter who is in the room states that she sometimes holds her breath, but this can occur during the day or night so I am not convinced her to sleep disordered breathing.  We will just check overnight oximetry.  Patient has pulled off electro leads several times, probably because of the dementia.    EMR Dragon/Transcription disclaimer:   Much of this encounter note is an electronic transcription/translation of spoken language to printed text. The electronic translation of spoken language may permit erroneous, or at times, nonsensical words or phrases to be inadvertently transcribed.  Although I have reviewed the note for such errors, some may still exist. Please contact me if needed for clarification or  correction.  Shar Christiansen M.D.

## 2022-03-03 NOTE — PROGRESS NOTES
Follow up check-in with palliative pt in 2184. Pt awake and alert laying in bed listening to music. Explored how pt was feeling and coping. Pt shared about the pain he deals with almost on a daily basis. Shared about events leading to his current health challenges. Self-reported to have good support.  asked pt a question in preparation for discharge planning. Pt shared that sister would be helping pt with infusion at home. Pt didn't appear to be up to talking too much but seemed to be coping well.  extended blessing and well wishes and concluded visit. Contact Spiritual Care for any further referrals.   Venice Pham M.Div, [facility]   Paging Service [phone])

## 2022-03-04 VITALS
HEIGHT: 72 IN | SYSTOLIC BLOOD PRESSURE: 127 MMHG | TEMPERATURE: 99.1 F | WEIGHT: 120 LBS | BODY MASS INDEX: 16.25 KG/M2 | OXYGEN SATURATION: 100 % | DIASTOLIC BLOOD PRESSURE: 75 MMHG | HEART RATE: 116 BPM | RESPIRATION RATE: 16 BRPM

## 2022-03-04 PROBLEM — A41.9 SEPSIS (HCC): Status: RESOLVED | Noted: 2022-01-26 | Resolved: 2022-03-04

## 2022-03-04 LAB
ANION GAP SERPL CALC-SCNC: 6 MMOL/L (ref 5–15)
BUN SERPL-MCNC: 12 MG/DL (ref 6–20)
BUN/CREAT SERPL: 32 (ref 12–20)
CALCIUM SERPL-MCNC: 8.5 MG/DL (ref 8.5–10.1)
CHLORIDE SERPL-SCNC: 104 MMOL/L (ref 97–108)
CO2 SERPL-SCNC: 25 MMOL/L (ref 21–32)
CREAT SERPL-MCNC: 0.38 MG/DL (ref 0.7–1.3)
GLUCOSE SERPL-MCNC: 131 MG/DL (ref 65–100)
HCT VFR BLD AUTO: 31.4 % (ref 36.6–50.3)
HGB BLD-MCNC: 8.9 G/DL (ref 12.1–17)
POTASSIUM SERPL-SCNC: 3.8 MMOL/L (ref 3.5–5.1)
SODIUM SERPL-SCNC: 135 MMOL/L (ref 136–145)

## 2022-03-04 PROCEDURE — 74011000250 HC RX REV CODE- 250: Performed by: INTERNAL MEDICINE

## 2022-03-04 PROCEDURE — 74011250636 HC RX REV CODE- 250/636: Performed by: INTERNAL MEDICINE

## 2022-03-04 PROCEDURE — 76937 US GUIDE VASCULAR ACCESS: CPT

## 2022-03-04 PROCEDURE — 99233 SBSQ HOSP IP/OBS HIGH 50: CPT | Performed by: INTERNAL MEDICINE

## 2022-03-04 PROCEDURE — C1751 CATH, INF, PER/CENT/MIDLINE: HCPCS

## 2022-03-04 PROCEDURE — 80048 BASIC METABOLIC PNL TOTAL CA: CPT

## 2022-03-04 PROCEDURE — 77030018786 HC NDL GD F/USND BARD -B

## 2022-03-04 PROCEDURE — 36415 COLL VENOUS BLD VENIPUNCTURE: CPT

## 2022-03-04 PROCEDURE — 85018 HEMOGLOBIN: CPT

## 2022-03-04 PROCEDURE — 74011250637 HC RX REV CODE- 250/637: Performed by: INTERNAL MEDICINE

## 2022-03-04 PROCEDURE — 74011000258 HC RX REV CODE- 258: Performed by: INTERNAL MEDICINE

## 2022-03-04 RX ORDER — HYDROMORPHONE HYDROCHLORIDE 1 MG/ML
1 INJECTION, SOLUTION INTRAMUSCULAR; INTRAVENOUS; SUBCUTANEOUS ONCE
Status: COMPLETED | OUTPATIENT
Start: 2022-03-04 | End: 2022-03-04

## 2022-03-04 RX ORDER — OXYCODONE AND ACETAMINOPHEN 7.5; 325 MG/1; MG/1
1 TABLET ORAL
Qty: 7 TABLET | Refills: 0 | Status: SHIPPED | OUTPATIENT
Start: 2022-03-04 | End: 2022-03-07

## 2022-03-04 RX ADMIN — HYDROMORPHONE HYDROCHLORIDE 2 MG: 2 TABLET ORAL at 11:03

## 2022-03-04 RX ADMIN — CEFEPIME HYDROCHLORIDE 2 G: 2 INJECTION, POWDER, FOR SOLUTION INTRAVENOUS at 11:04

## 2022-03-04 RX ADMIN — HYDROMORPHONE HYDROCHLORIDE 1 MG: 1 INJECTION, SOLUTION INTRAMUSCULAR; INTRAVENOUS; SUBCUTANEOUS at 18:17

## 2022-03-04 RX ADMIN — ENOXAPARIN SODIUM 40 MG: 100 INJECTION SUBCUTANEOUS at 08:08

## 2022-03-04 RX ADMIN — HYDROMORPHONE HYDROCHLORIDE 1 MG: 1 INJECTION, SOLUTION INTRAMUSCULAR; INTRAVENOUS; SUBCUTANEOUS at 13:36

## 2022-03-04 RX ADMIN — CEFEPIME HYDROCHLORIDE 2 G: 2 INJECTION, POWDER, FOR SOLUTION INTRAVENOUS at 03:53

## 2022-03-04 RX ADMIN — LOPERAMIDE HYDROCHLORIDE 2 MG: 2 CAPSULE ORAL at 08:15

## 2022-03-04 RX ADMIN — Medication 1 CAPSULE: at 08:08

## 2022-03-04 RX ADMIN — HYDROMORPHONE HYDROCHLORIDE 2 MG: 2 TABLET ORAL at 16:12

## 2022-03-04 RX ADMIN — HYDROMORPHONE HYDROCHLORIDE 1 MG: 1 INJECTION, SOLUTION INTRAMUSCULAR; INTRAVENOUS; SUBCUTANEOUS at 08:06

## 2022-03-04 RX ADMIN — SODIUM CHLORIDE, PRESERVATIVE FREE 10 ML: 5 INJECTION INTRAVENOUS at 13:36

## 2022-03-04 RX ADMIN — SODIUM CHLORIDE, PRESERVATIVE FREE 10 ML: 5 INJECTION INTRAVENOUS at 05:05

## 2022-03-04 RX ADMIN — HYDROMORPHONE HYDROCHLORIDE 1 MG: 1 INJECTION, SOLUTION INTRAMUSCULAR; INTRAVENOUS; SUBCUTANEOUS at 03:53

## 2022-03-04 NOTE — PROGRESS NOTES
[address] [place] [address] follow-up PCP transitional care appointment has been scheduled with Dr. Rima Mesa on 3/28/22 at 1330. Pending patient discharge.   Pelon Chin, Care Management Specialist

## 2022-03-04 NOTE — PROGRESS NOTES
Problem: Falls - Risk of  Goal: *Absence of Falls  Description: Document Robbie Blight Fall Risk and appropriate interventions in the flowsheet.   Outcome: Progressing Towards Goal  Note: Fall Risk Interventions:  Mobility Interventions: Communicate number of staff needed for ambulation/transfer,Patient to call before getting OOB         Medication Interventions: Bed/chair exit alarm,Teach patient to arise slowly    Elimination Interventions: Bed/chair exit alarm,Call light in reach,Toileting schedule/hourly rounds    History of Falls Interventions: Bed/chair exit alarm,Room close to nurse's station         Problem: Pain  Goal: *Control of Pain  Outcome: Progressing Towards Goal     Problem: Patient Education: Go to Patient Education Activity  Goal: Patient/Family Education  Outcome: Progressing Towards Goal
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
## 2022-03-04 NOTE — PROGRESS NOTES
Pt medicated per MAR  Pt provided prn dilaudid, for 10/10 leg pain.   Dressing to right hip site cleansed with NS  patted dry, meiplex replaced  Call bell in reach  Will continue to monitor

## 2022-03-04 NOTE — PROGRESS NOTES
MIDLINE Insertion Procedure  Note:     Procedure explained to  pt  along with risks and benefits. Procedure teaching completed. Pt denies questions or concerns at this time. Pre procedure assessment done. Maximum sterile barrier precautions observed throughout procedure. Lidocaine 1 %  2.0   ml sc injected to site prior to access the vein . Cannulated   basilic   vein using ultrasound guidance. Inserted  4  Fr. single   lumen midline to   right upper arm. Blood return verified and  flushed with 20ml normal saline. Sterile dressing applied with biopatch, statLock and occlusive dressing as per protocol. Curos cap applied to port. Patient tolerated procedure well with minimal blood loss. Reason for access : Reliable IV Access  / Discharge with Midline Catheter for IV medication administration (4 more days )  Complications related to insertion : None   See nursing message  for midline reminders  Midline is CT and MRI compatible. Inserted by : Clayton Kilpatrick RN. TASHI MARTINS. Vascular Access Nurse  Assisted by : Clary Alexander RN Vascular Access Nurse  Total Catheter Length : 13  cm   Internal length  :  13  cm  External Length : At the hub      Arm circumference :    26   cm  Catheter occupies   13   % of vein. Type of Midline:  Bard  Power Midline     Ref#:  [id number]   Lot#:    [id number]  Expiration Date:    2023-02-28  Informed primary nurse Kilo Arita RN that midline is ready for use and to hang new infusion tubing prior to use and apply tourniquet above the catheter tip for blood draw. Clayton Kilpatrick RN. TASHI MARTINS. PICC nurse.  Vascular Access Team

## 2022-03-04 NOTE — PROGRESS NOTES
Transition of Care Plan:     RUR: 15% moderate  Disposition: Home with HH [facility] and IV ABX [facility]  Follow up appointments: PCP, Specialists  DME needed: Pt has a wheelchair  Transportation at [address] or means to access home: N/A  IM Medicare Letter: N/A  Is patient a BCPI-A Bundle:   No                   If yes, was Bundle Letter given?:    Is patient a  and connected with the [facility]?      No          If yes, was [employer] transfer form completed and [facility] notified? Caregiver Contact: Aunt, Jeanette Agarwal ([phone])  Discharge Caregiver contacted prior to discharge? Pt will contact cg prior to d/c  Care Conference needed?: Not indicated at this time    CM contacted pt's mother, Ms. Ambrocio Hernández ([phone]) to discuss d/c plan. She reports that pt can d/c to her home and she is willing to learn how to administer the IV ABX. She reports she will not be able to make it to the hospital but is willing to let Jeanette Leary [facility] do the teaching in the home. D/c address is [address] [place] [address], [facility] [address]. [facility] has accepted pt for SN (PICC line care and wound care). CM updated Jeanette Leary [facility] on above information. CM to update MD and discuss possible d/c this afternoon.      Joshua Smiley, MSW  Care Manager [facility]  [phone]

## 2022-03-04 NOTE — PROGRESS NOTES
Sacral wound care complete.   Labs obtained and sent to lab  Pt continuse to shake and rub body due to pain  MD states to D/C STU Mckeon MD states pt has narcotic seeking behavior  Writer will lisa/c IV dilaudid

## 2022-03-04 NOTE — PROGRESS NOTES
Pt medicated per MAR. Pts pain does not appear to be controlled by PO dilaudid.   Pt has had intermittent IV dilaudid X2  MD FINLEY notified
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
## 2022-03-05 NOTE — PROGRESS NOTES
Pt provided with discharge instructions and verbalized understanding of all topics covered. Pt states that he has no other questions at this time.

## 2022-03-07 NOTE — PROGRESS NOTES
Physician Progress Note      PATIENT:               Yoel East  [facility] #:                  [id number]  :                       1995  ADMIT DATE:       2022 3:25 PM  [address] [place] DATE:        3/4/2022 9:34 PM  RESPONDING  PROVIDER #:        [id number] [facility] TEXT:    Patient admitted with decubitus ulcer infection and septic arthritis of the right hip . Per Op note dated  documentation of excisional debridement. To accurately reflect the procedure performed please clarify if debridement was excisional or nonexcisional and the deepest depth of tissue removed as down to and including: The medical record reflects the following:  Risk Factors: Hx: paraplegia; bacteremia; decubitus ulcer. ... Nan Chacko Dx w/ Septic arthritis  Clinical Indicators: Per Op Note:  I then performed to an H type capsulotomy, this did reveal the femoral neck, joint fluid was extruded, this was inflammatory, but not grossly purulent, I elevated the flaps to allow for good access of irrigation, I debrided the synovium with a rongeur of any nonviable appearing tissue. I then irrigated with 6 L of normal saline mixed with Betadine. Once this was adequately irrigated and debrided, I loosely closed the capsule, then I placed absorbable beads in the periarticular space, I placed a medium Hemovac drain exiting laterally. Treatment: Admit patient to telemetry; Start patient on broad-spectrum antibiotic; Wound care consultation;  Follow-up blood culture; ortho consult ; surgery consult; ID consult    Thank you,    Daylin Patel  [facility]  [phone]  Options provided:  -- Excisional debridement of subcutaneous tissue  -- Excisional debridement of fascia  -- Excisional debridement of muscle  -- Excisional debridement of bone  -- Other - I will add my own diagnosis  -- Disagree - Not applicable / Not valid  -- Disagree - Clinically unable to determine / Unknown  -- Refer to Clinical Documentation Reviewer    PROVIDER RESPONSE TEXT:    Excisional debridement of bone of right hip was performed during procedure on 2/25.     Query created by: Cherrie Alejandro on 3/4/2022 10:22 PM      Electronically signed by:  Ru Blanco DO 3/7/2022 7:13 AM

## 2022-03-18 PROBLEM — I47.29 NSVT (NONSUSTAINED VENTRICULAR TACHYCARDIA) (HCC): Status: ACTIVE | Noted: 2022-01-31

## 2022-03-18 PROBLEM — N39.0 UTI (URINARY TRACT INFECTION): Status: ACTIVE | Noted: 2017-10-13

## 2022-03-19 PROBLEM — L89.504 DECUBITUS ULCER OF ANKLE, STAGE 4 (HCC): Status: ACTIVE | Noted: 2022-01-26

## 2022-03-19 PROBLEM — R78.81 BACTEREMIA DUE TO GRAM-NEGATIVE BACTERIA: Status: ACTIVE | Noted: 2017-10-13

## 2022-03-28 ENCOUNTER — OFFICE VISIT (OUTPATIENT)
Dept: INTERNAL MEDICINE CLINIC | Age: 27
End: 2022-03-28
Payer: MEDICAID

## 2022-03-28 VITALS
OXYGEN SATURATION: 98 % | DIASTOLIC BLOOD PRESSURE: 74 MMHG | RESPIRATION RATE: 16 BRPM | TEMPERATURE: 98.2 F | SYSTOLIC BLOOD PRESSURE: 108 MMHG | HEART RATE: 115 BPM

## 2022-03-28 DIAGNOSIS — Z76.89 ENCOUNTER TO ESTABLISH CARE WITH NEW DOCTOR: ICD-10-CM

## 2022-03-28 DIAGNOSIS — Z13.220 ENCOUNTER FOR LIPID SCREENING FOR CARDIOVASCULAR DISEASE: ICD-10-CM

## 2022-03-28 DIAGNOSIS — M86.68 OTHER CHRONIC OSTEOMYELITIS, OTHER SITE (HCC): ICD-10-CM

## 2022-03-28 DIAGNOSIS — Z72.0 TOBACCO ABUSE: ICD-10-CM

## 2022-03-28 DIAGNOSIS — Z11.59 ENCOUNTER FOR HEPATITIS C SCREENING TEST FOR LOW RISK PATIENT: ICD-10-CM

## 2022-03-28 DIAGNOSIS — Z13.6 ENCOUNTER FOR LIPID SCREENING FOR CARDIOVASCULAR DISEASE: ICD-10-CM

## 2022-03-28 DIAGNOSIS — F32.0 CURRENT MILD EPISODE OF MAJOR DEPRESSIVE DISORDER WITHOUT PRIOR EPISODE (HCC): ICD-10-CM

## 2022-03-28 DIAGNOSIS — M00.051 STAPHYLOCOCCAL ARTHRITIS OF RIGHT HIP (HCC): Primary | ICD-10-CM

## 2022-03-28 DIAGNOSIS — N31.9 NEUROGENIC BLADDER: ICD-10-CM

## 2022-03-28 DIAGNOSIS — G82.20 PARAPLEGIA (HCC): ICD-10-CM

## 2022-03-28 DIAGNOSIS — Z11.4 SCREENING FOR HIV (HUMAN IMMUNODEFICIENCY VIRUS): ICD-10-CM

## 2022-03-28 PROCEDURE — 99204 OFFICE O/P NEW MOD 45 MIN: CPT | Performed by: INTERNAL MEDICINE

## 2022-03-28 RX ORDER — ESCITALOPRAM OXALATE 10 MG/1
10 TABLET ORAL DAILY
Qty: 30 TABLET | Refills: 2 | Status: SHIPPED | OUTPATIENT
Start: 2022-03-28

## 2022-03-28 RX ORDER — LEVOFLOXACIN 500 MG/1
500 TABLET, FILM COATED ORAL DAILY
Qty: 14 TABLET | Refills: 0 | Status: SHIPPED | OUTPATIENT
Start: 2022-03-28

## 2022-03-28 NOTE — PROGRESS NOTES
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 26-JAN-2022 14:49,  Sinus rhythm has replaced Atrial flutter    Confirmed by Jasperus Sorto ([id number]) on 2/23/2022 8:57:52 AM           Salvador Will MD, FACP, [facility].   Via On license of [facility], [place], [place].

## 2022-03-28 NOTE — PROGRESS NOTES
1. Have you been to the ER, urgent care clinic since your last visit? Hospitalized since your last visit?no    2. Have you seen or consulted any other health care providers outside of the [address] [place] [address] since your last visit? Include any pap smears or colon screening.  No    Chief Complaint   Patient presents with   [facility] Referral Request     Pain management and wound care     3 most recent PHQ Screens 3/28/2022   Little interest or pleasure in doing things Several days   Feeling down, depressed, irritable, or hopeless Nearly every day   Total Score PHQ 2 4   Trouble falling or staying asleep, or sleeping too much Not at all   Feeling tired or having little energy Several days   Poor appetite, weight loss, or overeating Not at all   Feeling bad about yourself - or that you are a failure or have let yourself or your family down Several days   Trouble concentrating on things such as school, work, reading, or watching TV Several days   Moving or speaking so slowly that other people could have noticed; or the opposite being so fidgety that others notice Several days   Thoughts of being better off dead, or hurting yourself in some way Several days   PHQ 9 Score 9   How difficult have these problems made it for you to do your work, take care of your home and get along with others Not difficult at all

## 2022-03-30 ENCOUNTER — TELEPHONE (OUTPATIENT)
Dept: INFECTIOUS DISEASES | Age: 27
End: 2022-03-30

## 2022-03-30 ENCOUNTER — OFFICE VISIT (OUTPATIENT)
Dept: ORTHOPEDIC SURGERY | Age: 27
End: 2022-03-30
Payer: MEDICAID

## 2022-03-30 ENCOUNTER — TELEPHONE (OUTPATIENT)
Dept: ORTHOPEDIC SURGERY | Age: 27
End: 2022-03-30

## 2022-03-30 VITALS
HEIGHT: 72 IN | OXYGEN SATURATION: 100 % | HEART RATE: 110 BPM | BODY MASS INDEX: 16.27 KG/M2 | DIASTOLIC BLOOD PRESSURE: 79 MMHG | SYSTOLIC BLOOD PRESSURE: 122 MMHG | TEMPERATURE: 97.2 F

## 2022-03-30 DIAGNOSIS — Z47.89 ORTHOPEDIC AFTERCARE: Primary | ICD-10-CM

## 2022-03-30 PROCEDURE — 99024 POSTOP FOLLOW-UP VISIT: CPT | Performed by: ORTHOPAEDIC SURGERY

## 2022-03-30 NOTE — PROGRESS NOTES
Identified pt with two pt identifiers (name and ). Reviewed chart in preparation for visit and have obtained necessary documentation. Dhaval Lancaster is a 23 y.o. male  Chief Complaint   Patient presents with    Surgical Follow-up     RT hip     Visit Vitals  /79 (BP 1 Location: Right arm, BP Patient Position: Sitting, BP Cuff Size: Adult)   Pulse (!) 110   Temp 97.2 °F (36.2 °C) (Tympanic)   Ht 6' (1.829 m)   SpO2 100%   BMI 16.27 kg/m²     1. Have you been to the ER, urgent care clinic since your last visit? Hospitalized since your last visit? No    2. Have you seen or consulted any other health care providers outside of the [address] [place] [address] since your last visit? Include any pap smears or colon screening.  No

## 2022-03-30 NOTE — PROGRESS NOTES
Hayden Sullivan  is here for a follow up visit from a right hip irrigation and debridement. Pain has been appropriate since surgery, no major medical complications since surgery. Antibiotic therapy continues. Current Outpatient Medications on File Prior to Visit   Medication Sig Dispense Refill    levoFLOXacin (LEVAQUIN) 500 mg tablet Take 1 Tablet by mouth daily. 14 Tablet 0    escitalopram oxalate (LEXAPRO) 10 mg tablet Take 1 Tablet by mouth daily. 30 Tablet 2    oxyCODONE IR (ROXICODONE) 10 mg tab immediate release tablet Take 10 mg by mouth every six (6) hours as needed for Pain. (Patient not taking: Reported on 3/28/2022)      cyclobenzaprine (FLEXERIL) 10 mg tablet Take 10 mg by mouth daily. (Patient not taking: Reported on 3/28/2022)       No current facility-administered medications on file prior to visit. ROS:  General: denies agitation, major chest pain, unexpected weakness  Patient states pain in right hip  Skin: healing wound is without issue or drainage   Strength: appropriate weakness of involved extremity is resolving since surgery      Physical Examination:    There were no vitals taken for this visit.     Dressing: None  Skin: Clean dry and intact  Sensation diminished appropriate with baseline neurologic deficits  Strength is 0 out of 5 distally  Range of motion is very limited secondary to contractures  Distal swelling is noted, but appropriate for postoperative course  Distal capillary refill less than 2 seconds      Imaging:    Postoperative imaging: None today      Assessment: Status post irrigation and debridement right hip    Plan:  Patient will not need physical therapy  Wound care was reviewed  Weightbearing will be full through the leg  Deep Venous Thrombosis Prophylaxis: None  Antibiotic care will be administered through the infectious disease team, unless he has any indication of recurrence of infection, we will follow him with labs only, and he will follow with general surgery for his decubitus ulcers.   Follow up will be as needed from now, none xrays on follow up

## 2022-03-30 NOTE — TELEPHONE ENCOUNTER
Rcvd call from patient's [address] wanting make sure certain questions/issues are covered during today's appt as she is concerned that pt will forget to ask.  -Saw PCP yesterday and was referred to Neurology for neuropathy in legs. Was recommended to take Aleve/Tylenol, but that is not helping. Would like to know if we can send in something else (has already tried Gabapentin) and refer the patient to pain management. She says pt may have difficulty describing pain, but it is a shooting pain.   -Requesting wound care instructions for sacral and buttocks wounds.  States PCP referred to us for wound care.   -Also requesting pt be sent with AVS.     She can be reached at [phone]

## 2022-03-31 ENCOUNTER — VIRTUAL VISIT (OUTPATIENT)
Dept: INFECTIOUS DISEASES | Age: 27
End: 2022-03-31
Payer: MEDICAID

## 2022-03-31 DIAGNOSIS — Z22.322 METHICILLIN RESISTANT STAPH AUREUS CULTURE POSITIVE: ICD-10-CM

## 2022-03-31 DIAGNOSIS — S31.000D WOUND OF SACRAL REGION, SUBSEQUENT ENCOUNTER: ICD-10-CM

## 2022-03-31 DIAGNOSIS — R78.81 STREPTOCOCCAL BACTEREMIA: ICD-10-CM

## 2022-03-31 DIAGNOSIS — A49.8 INFECTION DUE TO DIPHTHEROID BACTERIA: ICD-10-CM

## 2022-03-31 DIAGNOSIS — A49.8 PSEUDOMONAS INFECTION: ICD-10-CM

## 2022-03-31 DIAGNOSIS — N39.0 E-COLI UTI: ICD-10-CM

## 2022-03-31 DIAGNOSIS — M00.851 ARTHRITIS OF RIGHT HIP DUE TO OTHER BACTERIA (HCC): Primary | ICD-10-CM

## 2022-03-31 DIAGNOSIS — B95.61 STAPHYLOCOCCUS AUREUS BACTEREMIA: ICD-10-CM

## 2022-03-31 DIAGNOSIS — R78.81 GRAM-POSITIVE BACTEREMIA: ICD-10-CM

## 2022-03-31 DIAGNOSIS — R78.81 STAPHYLOCOCCUS AUREUS BACTEREMIA: ICD-10-CM

## 2022-03-31 DIAGNOSIS — Z87.828 HISTORY OF GUNSHOT WOUND: ICD-10-CM

## 2022-03-31 DIAGNOSIS — B95.5 STREPTOCOCCAL BACTEREMIA: ICD-10-CM

## 2022-03-31 DIAGNOSIS — B96.20 E-COLI UTI: ICD-10-CM

## 2022-03-31 DIAGNOSIS — G82.20 PARAPLEGIA (HCC): ICD-10-CM

## 2022-03-31 PROCEDURE — 99214 OFFICE O/P EST MOD 30 MIN: CPT | Performed by: INTERNAL MEDICINE

## 2022-03-31 NOTE — PROGRESS NOTES
Identified pt with two pt identifiers(name and ). Reviewed record in preparation for visit and have obtained necessary documentation. Chief Complaint   Patient presents with    Follow-up     [phone]          No Known Allergies    Current Outpatient Medications   Medication Instructions    escitalopram oxalate (LEXAPRO) 10 mg, Oral, DAILY    levoFLOXacin (LEVAQUIN) 500 mg, Oral, DAILY       Health Maintenance Review: Patient reminded of \"due or due soon\" health maintenance. I have asked the patient to contact his/her primary care provider (PCP) for follow-up on his/her health maintenance. Immunization History   Administered Date(s) Administered    Tdap 2014           Coordination of Care Questionnaire:  :   1) Have you been to an emergency room, urgent care, or hospitalized since your last visit? If yes, where when, and reason for visit? NO       2. Have seen or consulted any other health care provider since your last visit? If yes, where when, and reason for visit? NO      Patient is accompanied by self I have received verbal consent from Ivet Garrido to discuss any/all medical information while they are present in the room.
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
## 2022-03-31 NOTE — PATIENT INSTRUCTIONS
ADOR Activation    Thank you for requesting access to ADOR. Please follow the instructions below to securely access and download your online medical record. ADOR allows you to send messages to your doctor, view your test results, renew your prescriptions, schedule appointments, and more. How Do I Sign Up? 1. In your internet browser, go to [email or web address]. 2. Click on the First Time User? Click Here link in the Sign In box. You will see the New Member Sign Up page. 3. Enter your ADOR Access Code exactly as it appears below. You will not need to use this code after youve completed the sign-up process. If you do not sign up before the expiration date, you must request a new code. ADOR Access Code: [email or web address]  Expires: 2022  3:59 PM (This is the date your ADOR access code will )    4. Enter the last four digits of your Social Security Number (xxxx) and Date of Birth (mm/dd/yyyy) as indicated and click Submit. You will be taken to the next sign-up page. 5. Create a ADOR ID. This will be your ADOR login ID and cannot be changed, so think of one that is secure and easy to remember. 6. Create a ADOR password. You can change your password at any time. 7. Enter your Password Reset Question and Answer. This can be used at a later time if you forget your password. 8. Enter your e-mail address. You will receive e-mail notification when new information is available in [address] 9. Click Sign Up. You can now view and download portions of your medical record. 10. Click the Download Summary menu link to download a portable copy of your medical information. Additional Information    If you have questions, please visit the Frequently Asked Questions section of the ADOR website at [email or web address] Remember, ADOR is NOT to be used for urgent needs. For medical emergencies, dial 911.

## 2022-04-01 ENCOUNTER — TELEPHONE (OUTPATIENT)
Dept: FAMILY MEDICINE CLINIC | Age: 27
End: 2022-04-01

## 2022-04-01 NOTE — TELEPHONE ENCOUNTER
MD Jared Bryson LPN  Caller: Unspecified (Today, 12:31 PM)  No new orders.            Charles iSngleton notified

## 2022-04-01 NOTE — TELEPHONE ENCOUNTER
Derek gr/Leann Gallardo [facility]     [phone]    States she is returning a call to [place]     He wanted to discuss wound care with her     Please call with info

## 2022-04-01 NOTE — TELEPHONE ENCOUNTER
Spoke with Sue Jovel, [facility] nurse. Wound looking good. Patient has appt with Wound Care Specialist on Monday 4/4/22. She wanted to know if any new orders after reviewing labs.

## 2022-04-18 ENCOUNTER — TELEPHONE (OUTPATIENT)
Dept: ORTHOPEDIC SURGERY | Age: 27
End: 2022-04-18

## 2022-04-18 NOTE — TELEPHONE ENCOUNTER
I returned the Patient's home health nurse call and provided her the name of the doctor and number for [address] [facility].

## 2022-04-29 ENCOUNTER — TELEPHONE (OUTPATIENT)
Dept: ORTHOPEDIC SURGERY | Age: 27
End: 2022-04-29

## 2022-04-29 NOTE — TELEPHONE ENCOUNTER
Angélica Lindquist From [facility] called to say the patient has been a no show for three appointments the most recent one being today's appointment. She additionally stated that the patient will be schedule one more time but if he does not arrive to this appointment he will not be rescheduled and will be referred back to Dr. Blue Ovalle. I provided Angélica Lindquist with the contact information for Zulay Nicole the patient's at home nurse in hopes that she may be able to assist in getting the patient to arrive for an appointment.

## 2022-05-25 ENCOUNTER — HOSPITAL ENCOUNTER (OUTPATIENT)
Dept: WOUND CARE | Age: 27
Discharge: HOME | End: 2022-05-25
Payer: MEDICAID

## 2022-05-25 VITALS
DIASTOLIC BLOOD PRESSURE: 60 MMHG | SYSTOLIC BLOOD PRESSURE: 135 MMHG | TEMPERATURE: 97.5 F | HEART RATE: 108 BPM | RESPIRATION RATE: 16 BRPM

## 2022-05-25 DIAGNOSIS — L89.154 PRESSURE INJURY OF SACRAL REGION, STAGE 4 (HCC): ICD-10-CM

## 2022-05-25 DIAGNOSIS — G82.20 PARAPLEGIA (HCC): ICD-10-CM

## 2022-05-25 DIAGNOSIS — L89.324 PRESSURE SORE OF LEFT ISCHIUM, STAGE 4 (HCC): ICD-10-CM

## 2022-05-25 DIAGNOSIS — L89.314 PRESSURE INJURY OF RIGHT ISCHIUM, STAGE 4 (HCC): ICD-10-CM

## 2022-05-25 PROBLEM — L89.504 DECUBITUS ULCER OF ANKLE, STAGE 4 (HCC): Status: RESOLVED | Noted: 2022-01-26 | Resolved: 2022-05-25

## 2022-05-25 PROCEDURE — 99214 OFFICE O/P EST MOD 30 MIN: CPT

## 2022-05-25 PROCEDURE — 99203 OFFICE O/P NEW LOW 30 MIN: CPT | Performed by: SURGERY

## 2022-05-25 RX ORDER — LIDOCAINE HYDROCHLORIDE 20 MG/ML
JELLY TOPICAL ONCE
OUTPATIENT
Start: 2022-05-25 | End: 2022-05-25

## 2022-05-25 RX ORDER — LIDOCAINE HYDROCHLORIDE 40 MG/ML
SOLUTION TOPICAL ONCE
OUTPATIENT
Start: 2022-05-25 | End: 2022-05-25

## 2022-05-25 RX ORDER — BACITRACIN 500 [USP'U]/G
OINTMENT TOPICAL ONCE
OUTPATIENT
Start: 2022-05-25 | End: 2022-05-25

## 2022-05-25 RX ORDER — MUPIROCIN 20 MG/G
OINTMENT TOPICAL ONCE
OUTPATIENT
Start: 2022-05-25 | End: 2022-05-25

## 2022-05-25 RX ORDER — GENTAMICIN SULFATE 1 MG/G
OINTMENT TOPICAL ONCE
OUTPATIENT
Start: 2022-05-25 | End: 2022-05-25

## 2022-05-25 RX ORDER — BACITRACIN ZINC AND POLYMYXIN B SULFATE 500; 1000 [USP'U]/G; [USP'U]/G
OINTMENT TOPICAL ONCE
OUTPATIENT
Start: 2022-05-25 | End: 2022-05-25

## 2022-05-25 RX ORDER — SILVER SULFADIAZINE 10 G/1000G
CREAM TOPICAL ONCE
OUTPATIENT
Start: 2022-05-25 | End: 2022-05-25

## 2022-05-25 RX ORDER — LIDOCAINE 40 MG/G
CREAM TOPICAL ONCE
OUTPATIENT
Start: 2022-05-25 | End: 2022-05-25

## 2022-05-25 RX ORDER — CLOBETASOL PROPIONATE 0.5 MG/G
OINTMENT TOPICAL ONCE
OUTPATIENT
Start: 2022-05-25 | End: 2022-05-25

## 2022-05-25 RX ORDER — TRIAMCINOLONE ACETONIDE 1 MG/G
OINTMENT TOPICAL ONCE
OUTPATIENT
Start: 2022-05-25 | End: 2022-05-25

## 2022-05-25 RX ORDER — BETAMETHASONE DIPROPIONATE 0.5 MG/G
OINTMENT TOPICAL ONCE
OUTPATIENT
Start: 2022-05-25 | End: 2022-05-25

## 2022-05-25 NOTE — DISCHARGE INSTRUCTIONS
Discharge Instructions/Wound [address] [facility]. [address]  [facility], [address] [facility] [place], [id number]  Telephone: [phone]  WOUND CARE ORDERS:  Sacral and bilateral ischial wounds :Cleanse with saline , apply primary dressing Silver Alginate cover with secondary dressing   border foam .  Pt./pcg/HH nurse to change (freq) daily and as needed for compromise. F/U in 3 week. TREATMENT ORDERS:  Turn/reposition every 2 hours when in bed, avoid direct pressure on wound site. When sitting, shift position or do seat lifts every 15 minutes. Limit side lying to 30 degree tilt. Limit HOB elevation to 30 degrees. Use speciality pressure relief cushion, mattress as appropriate. Follow diet as prescribed:  [x] Diet as tolerated: [] Calorie Diabetic Diet:Low carb and no Sugar [] No Added Salt:[x] Increase Protein: [] Other:Limit the amount of liquid you are drinking and avoid drinking in between meals              Return Appointment:  [x] Return Appointment: With DR Mike Galindo  in  [employer])  [] Ordered tests:    Electronically signed Dread Chase RN on 5/25/2022 at [address]: Should you experience any significant changes in your wound(s) or have questions about your wound care, please contact the [facility] at [address] [place] [address] Street 8:00 am - 4:30. If you need help with your wound outside these hours and cannot wait until we are again available, contact your PCP or go to the hospital emergency room. PLEASE NOTE: IF YOU ARE UNABLE TO OBTAIN WOUND SUPPLIES, CONTINUE TO USE THE SUPPLIES YOU HAVE AVAILABLE UNTIL YOU ARE ABLE TO REACH US. IT IS MOST IMPORTANT TO KEEP THE WOUND COVERED AT ALL TIMES.      Physician Signature:_______________________    Date: ___________ Time:  ____________

## 2022-05-25 NOTE — PROGRESS NOTES
Pressure Injury Interventions: Turn every 1 to 2 hours, use pressure relief wheelchair cushion, and hospital bed with pressure relief mattress.

## 2022-05-25 NOTE — WOUND CARE
05/25/22 1334   Wound Ischial Right   Date First Assessed: 01/26/22   Present on Hospital Admission: Yes  Primary Wound Type: Pressure Injury  Location: Ischial  Wound Location Orientation: Right   Wound Image    Wound Etiology Pressure Stage 3   Dressing Status Old drainage noted   Cleansed Cleansed with saline   Wound Length (cm) 6.2 cm   Wound Width (cm) 11.2 cm   Wound Depth (cm) 2 cm   Wound Surface Area (cm^2) 69.44 cm^2   Change in Wound Size % 57.92   Wound Volume (cm^3) 138.88 cm^3   Wound Healing % 83   Undermining Starts ___ O'Clock 8 o'clock   Undermining Ends ___ O'Clock 11 o'clock   Undermining Maximum Distance (cm) 1.2 cm   Drainage Amount Large   Drainage Description Serous   Wound Odor Mild   Krista-Wound/Incision Assessment Intact   Edges Flat/open edges   Wound Thickness Description Full thickness     Visit Vitals  /60 (BP 1 Location: Left upper arm, BP Patient Position: At rest;Supine)   Pulse (!) 108   Temp 97.5 °F (36.4 °C)   Resp 16
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
## 2022-06-10 ENCOUNTER — HOSPITAL ENCOUNTER (EMERGENCY)
Age: 27
Discharge: HOME | End: 2022-06-11
Attending: EMERGENCY MEDICINE
Payer: MEDICAID

## 2022-06-10 DIAGNOSIS — M79.604 PAIN IN BOTH LOWER EXTREMITIES: ICD-10-CM

## 2022-06-10 DIAGNOSIS — N30.01 ACUTE CYSTITIS WITH HEMATURIA: Primary | ICD-10-CM

## 2022-06-10 DIAGNOSIS — M79.605 PAIN IN BOTH LOWER EXTREMITIES: ICD-10-CM

## 2022-06-10 PROCEDURE — 99283 EMERGENCY DEPT VISIT LOW MDM: CPT

## 2022-06-11 VITALS
BODY MASS INDEX: 16.42 KG/M2 | SYSTOLIC BLOOD PRESSURE: 111 MMHG | OXYGEN SATURATION: 94 % | DIASTOLIC BLOOD PRESSURE: 80 MMHG | WEIGHT: 121.25 LBS | RESPIRATION RATE: 17 BRPM | HEIGHT: 72 IN | TEMPERATURE: 98.3 F | HEART RATE: 99 BPM

## 2022-06-11 LAB
APPEARANCE UR: ABNORMAL
BACTERIA URNS QL MICRO: ABNORMAL /HPF
BILIRUB UR QL: NEGATIVE
COLOR UR: ABNORMAL
EPITH CASTS URNS QL MICRO: ABNORMAL /LPF
GLUCOSE UR STRIP.AUTO-MCNC: NEGATIVE MG/DL
HGB UR QL STRIP: ABNORMAL
KETONES UR QL STRIP.AUTO: NEGATIVE MG/DL
LEUKOCYTE ESTERASE UR QL STRIP.AUTO: ABNORMAL
NITRITE UR QL STRIP.AUTO: POSITIVE
PH UR STRIP: 8 [PH] (ref 5–8)
PROT UR STRIP-MCNC: 100 MG/DL
RBC #/AREA URNS HPF: ABNORMAL /HPF (ref 0–5)
SP GR UR REFRACTOMETRY: 1.02
TRI-PHOS CRY URNS QL MICRO: ABNORMAL
UA: UC IF INDICATED,UAUC: ABNORMAL
UROBILINOGEN UR QL STRIP.AUTO: 1 EU/DL (ref 0.2–1)
WBC URNS QL MICRO: >100 /HPF (ref 0–4)

## 2022-06-11 PROCEDURE — 81001 URINALYSIS AUTO W/SCOPE: CPT

## 2022-06-11 PROCEDURE — 87186 SC STD MICRODIL/AGAR DIL: CPT

## 2022-06-11 PROCEDURE — 87086 URINE CULTURE/COLONY COUNT: CPT

## 2022-06-11 PROCEDURE — 87077 CULTURE AEROBIC IDENTIFY: CPT

## 2022-06-11 PROCEDURE — 74011250637 HC RX REV CODE- 250/637: Performed by: EMERGENCY MEDICINE

## 2022-06-11 RX ORDER — OXYCODONE HYDROCHLORIDE 5 MG/1
5 TABLET ORAL
Status: COMPLETED | OUTPATIENT
Start: 2022-06-11 | End: 2022-06-11

## 2022-06-11 RX ORDER — CEFDINIR 300 MG/1
300 CAPSULE ORAL 2 TIMES DAILY
Qty: 14 CAPSULE | Refills: 0 | Status: SHIPPED | OUTPATIENT
Start: 2022-06-11

## 2022-06-11 RX ORDER — CEPHALEXIN 250 MG/1
500 CAPSULE ORAL
Status: COMPLETED | OUTPATIENT
Start: 2022-06-11 | End: 2022-06-11

## 2022-06-11 RX ORDER — OXYCODONE HYDROCHLORIDE 5 MG/1
5 TABLET ORAL
Qty: 12 TABLET | Refills: 0 | Status: SHIPPED | OUTPATIENT
Start: 2022-06-11 | End: 2022-06-14

## 2022-06-11 RX ADMIN — CEPHALEXIN 500 MG: 250 CAPSULE ORAL at 02:01

## 2022-06-11 RX ADMIN — OXYCODONE 5 MG: 5 TABLET ORAL at 00:46
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
## 2022-06-11 NOTE — ED NOTES
[facility] unable to transport pt's wheelchair with pt d/t lack of room on transport vehicle. Pt unable to find anyone that can  his wheelchair. Charge nurse made aware of situation.

## 2022-06-13 NOTE — ED NOTES
[facility] attending Dr. Odin Carrion called asking for information from prior ED visit at [facility] supposedly yesterday. There was no visit at [facility] ED. That is the reason for me accessing the chart.   Taina Rai MD

## 2022-06-15 LAB
BACTERIA SPEC CULT: ABNORMAL
CC UR VC: ABNORMAL
SERVICE CMNT-IMP: ABNORMAL

## 2022-07-21 ENCOUNTER — HOSPITAL ENCOUNTER (EMERGENCY)
Age: 27
Discharge: HOME | End: 2022-07-22
Attending: EMERGENCY MEDICINE | Admitting: EMERGENCY MEDICINE
Payer: MEDICAID

## 2022-07-21 ENCOUNTER — APPOINTMENT (OUTPATIENT)
Dept: CT IMAGING | Age: 27
End: 2022-07-21
Attending: EMERGENCY MEDICINE
Payer: MEDICAID

## 2022-07-21 VITALS
RESPIRATION RATE: 18 BRPM | DIASTOLIC BLOOD PRESSURE: 70 MMHG | SYSTOLIC BLOOD PRESSURE: 112 MMHG | HEART RATE: 71 BPM | WEIGHT: 130 LBS | TEMPERATURE: 97.8 F | HEIGHT: 72 IN | OXYGEN SATURATION: 100 % | BODY MASS INDEX: 17.61 KG/M2

## 2022-07-21 DIAGNOSIS — M25.451 EFFUSION OF RIGHT HIP: ICD-10-CM

## 2022-07-21 DIAGNOSIS — G89.29 CHRONIC PAIN OF RIGHT HIP: Primary | ICD-10-CM

## 2022-07-21 DIAGNOSIS — L89.314 PRESSURE INJURY OF RIGHT BUTTOCK, STAGE 4 (HCC): ICD-10-CM

## 2022-07-21 DIAGNOSIS — M25.551 CHRONIC PAIN OF RIGHT HIP: Primary | ICD-10-CM

## 2022-07-21 LAB
ALBUMIN SERPL-MCNC: 2.8 G/DL (ref 3.5–5)
ALBUMIN/GLOB SERPL: 0.5 {RATIO} (ref 1.1–2.2)
ALP SERPL-CCNC: 100 U/L (ref 45–117)
ALT SERPL-CCNC: 14 U/L (ref 12–78)
ANION GAP SERPL CALC-SCNC: 10 MMOL/L (ref 5–15)
AST SERPL-CCNC: 19 U/L (ref 15–37)
BASOPHILS # BLD: 0 K/UL (ref 0–0.1)
BASOPHILS NFR BLD: 0 % (ref 0–1)
BILIRUB SERPL-MCNC: 0.2 MG/DL (ref 0.2–1)
BUN SERPL-MCNC: 18 MG/DL (ref 6–20)
BUN/CREAT SERPL: 32 (ref 12–20)
CALCIUM SERPL-MCNC: 8.4 MG/DL (ref 8.5–10.1)
CHLORIDE SERPL-SCNC: 102 MMOL/L (ref 97–108)
CO2 SERPL-SCNC: 26 MMOL/L (ref 21–32)
CREAT SERPL-MCNC: 0.56 MG/DL (ref 0.7–1.3)
DIFFERENTIAL METHOD BLD: ABNORMAL
EOSINOPHIL # BLD: 0.1 K/UL (ref 0–0.4)
EOSINOPHIL NFR BLD: 1 % (ref 0–7)
ERYTHROCYTE [DISTWIDTH] IN BLOOD BY AUTOMATED COUNT: 19.7 % (ref 11.5–14.5)
ERYTHROCYTE [SEDIMENTATION RATE] IN BLOOD: 46 MM/HR (ref 0–15)
GLOBULIN SER CALC-MCNC: 5.6 G/DL (ref 2–4)
GLUCOSE SERPL-MCNC: 87 MG/DL (ref 65–100)
HCT VFR BLD AUTO: 35.1 % (ref 36.6–50.3)
HGB BLD-MCNC: 10.2 G/DL (ref 12.1–17)
IMM GRANULOCYTES # BLD AUTO: 0 K/UL (ref 0–0.04)
IMM GRANULOCYTES NFR BLD AUTO: 0 % (ref 0–0.5)
LACTATE SERPL-SCNC: 1 MMOL/L (ref 0.4–2)
LYMPHOCYTES # BLD: 2.2 K/UL (ref 0.8–3.5)
LYMPHOCYTES NFR BLD: 33 % (ref 12–49)
MCH RBC QN AUTO: 22.4 PG (ref 26–34)
MCHC RBC AUTO-ENTMCNC: 29.1 G/DL (ref 30–36.5)
MCV RBC AUTO: 77.1 FL (ref 80–99)
MONOCYTES # BLD: 0.4 K/UL (ref 0–1)
MONOCYTES NFR BLD: 5 % (ref 5–13)
NEUTS SEG # BLD: 4.1 K/UL (ref 1.8–8)
NEUTS SEG NFR BLD: 61 % (ref 32–75)
NRBC # BLD: 0 K/UL (ref 0–0.01)
NRBC BLD-RTO: 0 PER 100 WBC
PLATELET # BLD AUTO: 514 K/UL (ref 150–400)
PMV BLD AUTO: 8.8 FL (ref 8.9–12.9)
POTASSIUM SERPL-SCNC: 3.7 MMOL/L (ref 3.5–5.1)
PROT SERPL-MCNC: 8.4 G/DL (ref 6.4–8.2)
RBC # BLD AUTO: 4.55 M/UL (ref 4.1–5.7)
SODIUM SERPL-SCNC: 138 MMOL/L (ref 136–145)
WBC # BLD AUTO: 6.9 K/UL (ref 4.1–11.1)

## 2022-07-21 PROCEDURE — 99285 EMERGENCY DEPT VISIT HI MDM: CPT | Performed by: EMERGENCY MEDICINE

## 2022-07-21 PROCEDURE — 96374 THER/PROPH/DIAG INJ IV PUSH: CPT | Performed by: EMERGENCY MEDICINE

## 2022-07-21 PROCEDURE — 74177 CT ABD & PELVIS W/CONTRAST: CPT

## 2022-07-21 PROCEDURE — 86141 C-REACTIVE PROTEIN HS: CPT

## 2022-07-21 PROCEDURE — 96376 TX/PRO/DX INJ SAME DRUG ADON: CPT | Performed by: EMERGENCY MEDICINE

## 2022-07-21 PROCEDURE — 85652 RBC SED RATE AUTOMATED: CPT

## 2022-07-21 PROCEDURE — 87040 BLOOD CULTURE FOR BACTERIA: CPT

## 2022-07-21 PROCEDURE — 85025 COMPLETE CBC W/AUTO DIFF WBC: CPT

## 2022-07-21 PROCEDURE — 83605 ASSAY OF LACTIC ACID: CPT

## 2022-07-21 PROCEDURE — 36415 COLL VENOUS BLD VENIPUNCTURE: CPT

## 2022-07-21 PROCEDURE — 96375 TX/PRO/DX INJ NEW DRUG ADDON: CPT | Performed by: EMERGENCY MEDICINE

## 2022-07-21 PROCEDURE — 74011250636 HC RX REV CODE- 250/636: Performed by: EMERGENCY MEDICINE

## 2022-07-21 PROCEDURE — 99285 EMERGENCY DEPT VISIT HI MDM: CPT

## 2022-07-21 PROCEDURE — 74011000636 HC RX REV CODE- 636: Performed by: EMERGENCY MEDICINE

## 2022-07-21 PROCEDURE — 80053 COMPREHEN METABOLIC PANEL: CPT

## 2022-07-21 RX ORDER — MORPHINE SULFATE 4 MG/ML
4 INJECTION INTRAVENOUS
Status: COMPLETED | OUTPATIENT
Start: 2022-07-21 | End: 2022-07-22

## 2022-07-21 RX ORDER — IBUPROFEN 800 MG/1
800 TABLET ORAL
Qty: 20 TABLET | Refills: 0 | Status: SHIPPED | OUTPATIENT
Start: 2022-07-21 | End: 2022-07-28

## 2022-07-21 RX ORDER — SODIUM CHLORIDE 0.9 % (FLUSH) 0.9 %
10 SYRINGE (ML) INJECTION
Status: DISCONTINUED | OUTPATIENT
Start: 2022-07-21 | End: 2022-07-22 | Stop reason: HOSPADM

## 2022-07-21 RX ORDER — KETOROLAC TROMETHAMINE 30 MG/ML
30 INJECTION, SOLUTION INTRAMUSCULAR; INTRAVENOUS
Status: COMPLETED | OUTPATIENT
Start: 2022-07-21 | End: 2022-07-21

## 2022-07-21 RX ORDER — HYDROCODONE BITARTRATE AND ACETAMINOPHEN 5; 325 MG/1; MG/1
1 TABLET ORAL
Qty: 4 TABLET | Refills: 0 | Status: SHIPPED | OUTPATIENT
Start: 2022-07-21 | End: 2022-07-24

## 2022-07-21 RX ORDER — MORPHINE SULFATE 4 MG/ML
4 INJECTION INTRAVENOUS
Status: COMPLETED | OUTPATIENT
Start: 2022-07-21 | End: 2022-07-21

## 2022-07-21 RX ORDER — SODIUM CHLORIDE 0.9 % (FLUSH) 0.9 %
5-10 SYRINGE (ML) INJECTION AS NEEDED
Status: DISCONTINUED | OUTPATIENT
Start: 2022-07-21 | End: 2022-07-22 | Stop reason: HOSPADM

## 2022-07-21 RX ADMIN — SODIUM CHLORIDE 1770 ML: 9 INJECTION, SOLUTION INTRAVENOUS at 19:28

## 2022-07-21 RX ADMIN — KETOROLAC TROMETHAMINE 30 MG: 30 INJECTION, SOLUTION INTRAMUSCULAR; INTRAVENOUS at 20:15

## 2022-07-21 RX ADMIN — IOPAMIDOL 100 ML: 755 INJECTION, SOLUTION INTRAVENOUS at 21:19

## 2022-07-21 RX ADMIN — MORPHINE SULFATE 4 MG: 4 INJECTION INTRAVENOUS at 20:15
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
## 2022-07-22 LAB — CRP SERPL HS-MCNC: >9.5 MG/L

## 2022-07-22 PROCEDURE — 74011250636 HC RX REV CODE- 250/636: Performed by: EMERGENCY MEDICINE

## 2022-07-22 PROCEDURE — 74011000250 HC RX REV CODE- 250: Performed by: EMERGENCY MEDICINE

## 2022-07-22 RX ADMIN — MORPHINE SULFATE 4 MG: 4 INJECTION INTRAVENOUS at 00:05

## 2022-07-22 RX ADMIN — SODIUM CHLORIDE, PRESERVATIVE FREE 10 ML: 5 INJECTION INTRAVENOUS at 00:06

## 2022-07-22 NOTE — ED NOTES
Pt reports he needs help getting to his follow-up ortho appointments. Pt's face sheet left for case management.

## 2022-07-22 NOTE — ED NOTES
Discharge instructions were given to the patient by Milagros Holm RN. The patient left the Emergency Department ambulatory, alert and oriented and in no acute distress with 2 prescriptions. The patient was encouraged to call or return to the ED for worsening issues or problems and was encouraged to schedule a follow up appointment for continuing care. The patient verbalized understanding of discharge instructions and prescriptions, all questions were answered. The patient has no further concerns at this time.

## 2022-10-07 DIAGNOSIS — N31.9 NEUROGENIC BLADDER: ICD-10-CM

## 2022-10-07 DIAGNOSIS — M86.68 OTHER CHRONIC OSTEOMYELITIS, OTHER SITE (HCC): ICD-10-CM

## 2022-10-07 DIAGNOSIS — Z13.220 ENCOUNTER FOR LIPID SCREENING FOR CARDIOVASCULAR DISEASE: ICD-10-CM

## 2022-10-07 DIAGNOSIS — M00.051 STAPHYLOCOCCAL ARTHRITIS OF RIGHT HIP (HCC): ICD-10-CM

## 2022-10-07 DIAGNOSIS — Z76.89 ENCOUNTER TO ESTABLISH CARE WITH NEW DOCTOR: ICD-10-CM

## 2022-10-07 DIAGNOSIS — Z11.4 SCREENING FOR HIV (HUMAN IMMUNODEFICIENCY VIRUS): ICD-10-CM

## 2022-10-07 DIAGNOSIS — G82.20 PARAPLEGIA (HCC): ICD-10-CM

## 2022-10-07 DIAGNOSIS — Z72.0 TOBACCO ABUSE: ICD-10-CM

## 2022-10-07 DIAGNOSIS — Z13.6 ENCOUNTER FOR LIPID SCREENING FOR CARDIOVASCULAR DISEASE: ICD-10-CM

## 2022-10-07 DIAGNOSIS — F32.0 CURRENT MILD EPISODE OF MAJOR DEPRESSIVE DISORDER WITHOUT PRIOR EPISODE (HCC): ICD-10-CM

## 2022-10-07 DIAGNOSIS — Z11.59 ENCOUNTER FOR HEPATITIS C SCREENING TEST FOR LOW RISK PATIENT: ICD-10-CM

## 2022-11-08 ENCOUNTER — APPOINTMENT (OUTPATIENT)
Dept: GENERAL RADIOLOGY | Age: 27
End: 2022-11-08
Attending: STUDENT IN AN ORGANIZED HEALTH CARE EDUCATION/TRAINING PROGRAM
Payer: MEDICAID

## 2022-11-08 ENCOUNTER — HOSPITAL ENCOUNTER (OUTPATIENT)
Age: 27
Setting detail: OBSERVATION
Discharge: HOME HEALTH | End: 2022-11-11
Attending: STUDENT IN AN ORGANIZED HEALTH CARE EDUCATION/TRAINING PROGRAM | Admitting: INTERNAL MEDICINE
Payer: MEDICAID

## 2022-11-08 ENCOUNTER — APPOINTMENT (OUTPATIENT)
Dept: CT IMAGING | Age: 27
End: 2022-11-08
Attending: INTERNAL MEDICINE
Payer: MEDICAID

## 2022-11-08 DIAGNOSIS — L89.324 PRESSURE SORE OF LEFT ISCHIUM, STAGE 4 (HCC): ICD-10-CM

## 2022-11-08 DIAGNOSIS — L98.421 SKIN ULCER OF SACRUM, LIMITED TO BREAKDOWN OF SKIN (HCC): ICD-10-CM

## 2022-11-08 DIAGNOSIS — L98.429 SKIN ULCER OF SACRUM, UNSPECIFIED ULCER STAGE (HCC): Primary | ICD-10-CM

## 2022-11-08 DIAGNOSIS — L89.314 PRESSURE INJURY OF RIGHT ISCHIUM, STAGE 4 (HCC): ICD-10-CM

## 2022-11-08 DIAGNOSIS — G82.20 PARAPLEGIA (HCC): ICD-10-CM

## 2022-11-08 DIAGNOSIS — M86.60 CHRONIC OSTEOMYELITIS (HCC): ICD-10-CM

## 2022-11-08 DIAGNOSIS — L89.154 PRESSURE INJURY OF SACRAL REGION, STAGE 4 (HCC): ICD-10-CM
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
## 2022-11-08 DIAGNOSIS — A49.01 STAPH AUREUS INFECTION: ICD-10-CM

## 2022-11-08 DIAGNOSIS — A49.8 INFECTION CAUSED BY ENTEROBACTER CLOACAE: ICD-10-CM

## 2022-11-08 DIAGNOSIS — S31.000A: ICD-10-CM

## 2022-11-08 LAB
ALBUMIN SERPL-MCNC: 3.5 G/DL (ref 3.5–5)
ALBUMIN/GLOB SERPL: 0.7 {RATIO} (ref 1.1–2.2)
ALP SERPL-CCNC: 83 U/L (ref 45–117)
ALT SERPL-CCNC: 14 U/L (ref 12–78)
ANION GAP SERPL CALC-SCNC: 6 MMOL/L (ref 5–15)
AST SERPL-CCNC: 14 U/L (ref 15–37)
BASOPHILS # BLD: 0 K/UL (ref 0–0.1)
BASOPHILS NFR BLD: 1 % (ref 0–1)
BILIRUB SERPL-MCNC: 0.2 MG/DL (ref 0.2–1)
BUN SERPL-MCNC: 22 MG/DL (ref 6–20)
BUN/CREAT SERPL: 35 (ref 12–20)
CALCIUM SERPL-MCNC: 9.3 MG/DL (ref 8.5–10.1)
CHLORIDE SERPL-SCNC: 107 MMOL/L (ref 97–108)
CO2 SERPL-SCNC: 26 MMOL/L (ref 21–32)
CREAT SERPL-MCNC: 0.63 MG/DL (ref 0.7–1.3)
DIFFERENTIAL METHOD BLD: ABNORMAL
EOSINOPHIL # BLD: 0.3 K/UL (ref 0–0.4)
EOSINOPHIL NFR BLD: 4 % (ref 0–7)
ERYTHROCYTE [DISTWIDTH] IN BLOOD BY AUTOMATED COUNT: 19.8 % (ref 11.5–14.5)
GLOBULIN SER CALC-MCNC: 4.7 G/DL (ref 2–4)
GLUCOSE SERPL-MCNC: 83 MG/DL (ref 65–100)
HCT VFR BLD AUTO: 39.2 % (ref 36.6–50.3)
HGB BLD-MCNC: 12 G/DL (ref 12.1–17)
IMM GRANULOCYTES # BLD AUTO: 0 K/UL (ref 0–0.04)
IMM GRANULOCYTES NFR BLD AUTO: 0 % (ref 0–0.5)
LYMPHOCYTES # BLD: 2.7 K/UL (ref 0.8–3.5)
LYMPHOCYTES NFR BLD: 36 % (ref 12–49)
MCH RBC QN AUTO: 25.6 PG (ref 26–34)
MCHC RBC AUTO-ENTMCNC: 30.6 G/DL (ref 30–36.5)
MCV RBC AUTO: 83.8 FL (ref 80–99)
MONOCYTES # BLD: 0.5 K/UL (ref 0–1)
MONOCYTES NFR BLD: 7 % (ref 5–13)
NEUTS SEG # BLD: 3.9 K/UL (ref 1.8–8)
NEUTS SEG NFR BLD: 52 % (ref 32–75)
NRBC # BLD: 0 K/UL (ref 0–0.01)
NRBC BLD-RTO: 0 PER 100 WBC
PLATELET # BLD AUTO: 372 K/UL (ref 150–400)
PMV BLD AUTO: 9.8 FL (ref 8.9–12.9)
POTASSIUM SERPL-SCNC: 4.1 MMOL/L (ref 3.5–5.1)
PROT SERPL-MCNC: 8.2 G/DL (ref 6.4–8.2)
RBC # BLD AUTO: 4.68 M/UL (ref 4.1–5.7)
SODIUM SERPL-SCNC: 139 MMOL/L (ref 136–145)
WBC # BLD AUTO: 7.5 K/UL (ref 4.1–11.1)

## 2022-11-08 PROCEDURE — 36415 COLL VENOUS BLD VENIPUNCTURE: CPT

## 2022-11-08 PROCEDURE — 99285 EMERGENCY DEPT VISIT HI MDM: CPT

## 2022-11-08 PROCEDURE — 74011000636 HC RX REV CODE- 636: Performed by: INTERNAL MEDICINE

## 2022-11-08 PROCEDURE — 87205 SMEAR GRAM STAIN: CPT

## 2022-11-08 PROCEDURE — 74011250636 HC RX REV CODE- 250/636: Performed by: STUDENT IN AN ORGANIZED HEALTH CARE EDUCATION/TRAINING PROGRAM

## 2022-11-08 PROCEDURE — 96375 TX/PRO/DX INJ NEW DRUG ADDON: CPT

## 2022-11-08 PROCEDURE — 80053 COMPREHEN METABOLIC PANEL: CPT

## 2022-11-08 PROCEDURE — 87186 SC STD MICRODIL/AGAR DIL: CPT

## 2022-11-08 PROCEDURE — 85025 COMPLETE CBC W/AUTO DIFF WBC: CPT

## 2022-11-08 PROCEDURE — 74011250636 HC RX REV CODE- 250/636: Performed by: INTERNAL MEDICINE

## 2022-11-08 PROCEDURE — 87077 CULTURE AEROBIC IDENTIFY: CPT

## 2022-11-08 PROCEDURE — G0378 HOSPITAL OBSERVATION PER HR: HCPCS

## 2022-11-08 PROCEDURE — 74011250637 HC RX REV CODE- 250/637: Performed by: STUDENT IN AN ORGANIZED HEALTH CARE EDUCATION/TRAINING PROGRAM

## 2022-11-08 PROCEDURE — 74011000258 HC RX REV CODE- 258: Performed by: INTERNAL MEDICINE

## 2022-11-08 PROCEDURE — 74177 CT ABD & PELVIS W/CONTRAST: CPT

## 2022-11-08 PROCEDURE — 72100 X-RAY EXAM L-S SPINE 2/3 VWS: CPT

## 2022-11-08 RX ORDER — SODIUM CHLORIDE 0.9 % (FLUSH) 0.9 %
5-40 SYRINGE (ML) INJECTION AS NEEDED
Status: DISCONTINUED | OUTPATIENT
Start: 2022-11-08 | End: 2022-11-11 | Stop reason: HOSPADM

## 2022-11-08 RX ORDER — ENOXAPARIN SODIUM 100 MG/ML
40 INJECTION SUBCUTANEOUS DAILY
Status: DISCONTINUED | OUTPATIENT
Start: 2022-11-09 | End: 2022-11-11 | Stop reason: HOSPADM

## 2022-11-08 RX ORDER — SODIUM CHLORIDE 0.9 % (FLUSH) 0.9 %
5-40 SYRINGE (ML) INJECTION EVERY 8 HOURS
Status: DISCONTINUED | OUTPATIENT
Start: 2022-11-08 | End: 2022-11-11 | Stop reason: HOSPADM

## 2022-11-08 RX ORDER — OXYCODONE HYDROCHLORIDE 5 MG/1
5 TABLET ORAL
Status: COMPLETED | OUTPATIENT
Start: 2022-11-08 | End: 2022-11-08

## 2022-11-08 RX ORDER — ESCITALOPRAM OXALATE 10 MG/1
10 TABLET ORAL DAILY
Status: DISCONTINUED | OUTPATIENT
Start: 2022-11-09 | End: 2022-11-11 | Stop reason: HOSPADM

## 2022-11-08 RX ORDER — ONDANSETRON 4 MG/1
4 TABLET, ORALLY DISINTEGRATING ORAL
Status: DISCONTINUED | OUTPATIENT
Start: 2022-11-08 | End: 2022-11-11 | Stop reason: HOSPADM

## 2022-11-08 RX ORDER — KETOROLAC TROMETHAMINE 30 MG/ML
15 INJECTION, SOLUTION INTRAMUSCULAR; INTRAVENOUS ONCE
Status: COMPLETED | OUTPATIENT
Start: 2022-11-08 | End: 2022-11-08

## 2022-11-08 RX ORDER — ONDANSETRON 2 MG/ML
4 INJECTION INTRAMUSCULAR; INTRAVENOUS
Status: DISCONTINUED | OUTPATIENT
Start: 2022-11-08 | End: 2022-11-11 | Stop reason: HOSPADM

## 2022-11-08 RX ORDER — ACETAMINOPHEN 650 MG/1
650 SUPPOSITORY RECTAL
Status: DISCONTINUED | OUTPATIENT
Start: 2022-11-08 | End: 2022-11-11 | Stop reason: HOSPADM

## 2022-11-08 RX ORDER — VANCOMYCIN HYDROCHLORIDE
1250 ONCE
Status: COMPLETED | OUTPATIENT
Start: 2022-11-08 | End: 2022-11-09

## 2022-11-08 RX ORDER — ACETAMINOPHEN 325 MG/1
650 TABLET ORAL ONCE
Status: COMPLETED | OUTPATIENT
Start: 2022-11-08 | End: 2022-11-08

## 2022-11-08 RX ORDER — ACETAMINOPHEN 325 MG/1
650 TABLET ORAL
Status: DISCONTINUED | OUTPATIENT
Start: 2022-11-08 | End: 2022-11-11 | Stop reason: HOSPADM

## 2022-11-08 RX ORDER — POLYETHYLENE GLYCOL 3350 17 G/17G
17 POWDER, FOR SOLUTION ORAL DAILY PRN
Status: DISCONTINUED | OUTPATIENT
Start: 2022-11-08 | End: 2022-11-11 | Stop reason: HOSPADM

## 2022-11-08 RX ADMIN — OXYCODONE 5 MG: 5 TABLET ORAL at 18:49

## 2022-11-08 RX ADMIN — IOPAMIDOL 100 ML: 755 INJECTION, SOLUTION INTRAVENOUS at 20:13

## 2022-11-08 RX ADMIN — CEFEPIME 2 G: 2 INJECTION, POWDER, FOR SOLUTION INTRAVENOUS at 19:33

## 2022-11-08 RX ADMIN — KETOROLAC TROMETHAMINE 15 MG: 30 INJECTION, SOLUTION INTRAMUSCULAR; INTRAVENOUS at 18:49

## 2022-11-08 RX ADMIN — ACETAMINOPHEN 650 MG: 325 TABLET ORAL at 18:49
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
## 2022-11-08 NOTE — ED NOTES
Pt arrives via EMS stretcher complaining of bilateral leg pain. Pt states the leg pain is chronic but has been worsening. Pt also has 3 sacral wounds. Pt on monitor x2 with call bell in reach. 1850: Pt to CT at this time     1900: Pt back from xray at this time. Pt back on monitor x2 with call bell in reach.

## 2022-11-09 LAB
ANION GAP SERPL CALC-SCNC: 6 MMOL/L (ref 5–15)
BUN SERPL-MCNC: 27 MG/DL (ref 6–20)
BUN/CREAT SERPL: 47 (ref 12–20)
CALCIUM SERPL-MCNC: 9 MG/DL (ref 8.5–10.1)
CHLORIDE SERPL-SCNC: 107 MMOL/L (ref 97–108)
CO2 SERPL-SCNC: 24 MMOL/L (ref 21–32)
CREAT SERPL-MCNC: 0.57 MG/DL (ref 0.7–1.3)
ERYTHROCYTE [DISTWIDTH] IN BLOOD BY AUTOMATED COUNT: 19.9 % (ref 11.5–14.5)
GLUCOSE SERPL-MCNC: 93 MG/DL (ref 65–100)
HCT VFR BLD AUTO: 35.4 % (ref 36.6–50.3)
HGB BLD-MCNC: 10.3 G/DL (ref 12.1–17)
MCH RBC QN AUTO: 24.8 PG (ref 26–34)
MCHC RBC AUTO-ENTMCNC: 29.1 G/DL (ref 30–36.5)
MCV RBC AUTO: 85.1 FL (ref 80–99)
NRBC # BLD: 0 K/UL (ref 0–0.01)
NRBC BLD-RTO: 0 PER 100 WBC
PLATELET # BLD AUTO: 307 K/UL (ref 150–400)
PMV BLD AUTO: 10.3 FL (ref 8.9–12.9)
POTASSIUM SERPL-SCNC: 4.1 MMOL/L (ref 3.5–5.1)
RBC # BLD AUTO: 4.16 M/UL (ref 4.1–5.7)
SODIUM SERPL-SCNC: 137 MMOL/L (ref 136–145)
WBC # BLD AUTO: 5.8 K/UL (ref 4.1–11.1)

## 2022-11-09 PROCEDURE — 96366 THER/PROPH/DIAG IV INF ADDON: CPT

## 2022-11-09 PROCEDURE — G0378 HOSPITAL OBSERVATION PER HR: HCPCS

## 2022-11-09 PROCEDURE — 85027 COMPLETE CBC AUTOMATED: CPT

## 2022-11-09 PROCEDURE — 74011250637 HC RX REV CODE- 250/637: Performed by: INTERNAL MEDICINE

## 2022-11-09 PROCEDURE — 96376 TX/PRO/DX INJ SAME DRUG ADON: CPT

## 2022-11-09 PROCEDURE — 74011250637 HC RX REV CODE- 250/637: Performed by: PHYSICIAN ASSISTANT

## 2022-11-09 PROCEDURE — 36415 COLL VENOUS BLD VENIPUNCTURE: CPT

## 2022-11-09 PROCEDURE — 74011250636 HC RX REV CODE- 250/636: Performed by: INTERNAL MEDICINE

## 2022-11-09 PROCEDURE — 80048 BASIC METABOLIC PNL TOTAL CA: CPT

## 2022-11-09 PROCEDURE — 96372 THER/PROPH/DIAG INJ SC/IM: CPT

## 2022-11-09 PROCEDURE — 96365 THER/PROPH/DIAG IV INF INIT: CPT

## 2022-11-09 PROCEDURE — 74011000250 HC RX REV CODE- 250: Performed by: INTERNAL MEDICINE

## 2022-11-09 PROCEDURE — 74011000258 HC RX REV CODE- 258: Performed by: INTERNAL MEDICINE

## 2022-11-09 PROCEDURE — 74011250637 HC RX REV CODE- 250/637: Performed by: STUDENT IN AN ORGANIZED HEALTH CARE EDUCATION/TRAINING PROGRAM

## 2022-11-09 RX ORDER — GABAPENTIN 100 MG/1
100 CAPSULE ORAL 3 TIMES DAILY
Status: DISCONTINUED | OUTPATIENT
Start: 2022-11-09 | End: 2022-11-11 | Stop reason: HOSPADM

## 2022-11-09 RX ORDER — OXYCODONE HYDROCHLORIDE 5 MG/1
5 TABLET ORAL
Status: DISCONTINUED | OUTPATIENT
Start: 2022-11-09 | End: 2022-11-11 | Stop reason: HOSPADM

## 2022-11-09 RX ADMIN — SODIUM CHLORIDE, PRESERVATIVE FREE 10 ML: 5 INJECTION INTRAVENOUS at 15:23

## 2022-11-09 RX ADMIN — ENOXAPARIN SODIUM 40 MG: 100 INJECTION SUBCUTANEOUS at 10:35

## 2022-11-09 RX ADMIN — OXYCODONE 5 MG: 5 TABLET ORAL at 20:09

## 2022-11-09 RX ADMIN — ESCITALOPRAM OXALATE 10 MG: 10 TABLET ORAL at 10:35

## 2022-11-09 RX ADMIN — CEFEPIME 2 G: 2 INJECTION, POWDER, FOR SOLUTION INTRAVENOUS at 19:09

## 2022-11-09 RX ADMIN — OXYCODONE 5 MG: 5 TABLET ORAL at 04:26

## 2022-11-09 RX ADMIN — VANCOMYCIN HYDROCHLORIDE 1000 MG: 1 INJECTION, POWDER, LYOPHILIZED, FOR SOLUTION INTRAVENOUS at 15:22

## 2022-11-09 RX ADMIN — SODIUM CHLORIDE, PRESERVATIVE FREE 10 ML: 5 INJECTION INTRAVENOUS at 10:35

## 2022-11-09 RX ADMIN — VANCOMYCIN HYDROCHLORIDE 1000 MG: 1 INJECTION, POWDER, LYOPHILIZED, FOR SOLUTION INTRAVENOUS at 05:39

## 2022-11-09 RX ADMIN — ACETAMINOPHEN 650 MG: 325 TABLET ORAL at 04:00

## 2022-11-09 RX ADMIN — OXYCODONE 5 MG: 5 TABLET ORAL at 15:02

## 2022-11-09 RX ADMIN — OXYCODONE 5 MG: 5 TABLET ORAL at 10:35

## 2022-11-09 RX ADMIN — CEFEPIME 2 G: 2 INJECTION, POWDER, FOR SOLUTION INTRAVENOUS at 11:58

## 2022-11-09 RX ADMIN — SODIUM CHLORIDE, PRESERVATIVE FREE 10 ML: 5 INJECTION INTRAVENOUS at 22:00

## 2022-11-09 RX ADMIN — CEFEPIME 2 G: 2 INJECTION, POWDER, FOR SOLUTION INTRAVENOUS at 03:22

## 2022-11-09 RX ADMIN — VANCOMYCIN HYDROCHLORIDE 1250 MG: 10 INJECTION, POWDER, LYOPHILIZED, FOR SOLUTION INTRAVENOUS at 03:20

## 2022-11-09 RX ADMIN — GABAPENTIN 100 MG: 100 CAPSULE ORAL at 22:28
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
## 2022-11-09 NOTE — ED NOTES
Bedside and Verbal shift change report given to Madonna Nascimento RN (oncoming nurse) by Stephanie Meehan RN (offgoing nurse). Report included the following information SBAR, ED Summary, MAR, and Recent Results.

## 2022-11-09 NOTE — PROGRESS NOTES
Assumed care of patient after change of shift report. Patient alert. Oriented x 4. Paraplegic. Complaints of chronic bilateral leg pain 8/10 at this time. Last time pain medication given 0426. Respirations not labored. 1035  AM medications given with pain medication at this time for reports of bilateral leg pain at this time. 1145  Patient is being transferred to [facility], Room # 2259. Report given to Norman Andrade RN on [facility] for routine progression of care. Report consisted of the following information SBAR, Kardex, ED Summary, MAR, and Recent Results. Patient transferred to receiving unit by: transport (RN or tech name). Outstanding consults needed: No     Next labs due: No     The following personal items will be sent with the patient during transfer to the floor:     All valuables:    Cardiac monitoring ordered: No     The following CURRENT information was reported to the receiving RN:    Code status: Full Code at time of transfer    Last set of vital signs:  Vital Signs  Level of Consciousness: Alert (0) (11/09/22 0833)  Temp: 98.6 °F (37 °C) (11/09/22 0833)  Temp Source: Oral (11/09/22 0833)  Pulse (Heart Rate): (!) 57 (11/09/22 0833)  Heart Rate Source: Monitor (11/09/22 0265)  Cardiac Rhythm: Sinus Marcial (11/09/22 0833)  Resp Rate: 11 (11/09/22 0833)  BP: 95/61 (11/09/22 0833)  MAP (Monitor): 71 (11/09/22 0833)  MAP (Calculated): 72 (11/09/22 0833)  BP 1 Location: Right upper arm (11/08/22 1741)  BP 1 Method: Automatic (11/08/22 1741)  BP Patient Position: At rest (11/08/22 1741)  MEWS Score: 1 (11/09/22 9743)         Oxygen Therapy  O2 Sat (%): 98 % (11/09/22 0833)  Pulse via Oximetry: 57 beats per minute (11/09/22 0833)  O2 Device: None (11/09/22 0833)      Last pain assessment:  Pain 1  Pain Scale 1: Numeric (0 - 10)  Pain Intensity 1: 8  Patient Stated Pain Goal: 3  Pain Reassessment 1: Patient resting w/respiratory rate greater than 10  Pain Onset 1: chroni  Pain Location 1: Leg  Pain Orientation 1: Left, Right  Pain Description 1: Constant  Pain Intervention(s) 1: Rest      Wounds: Yes    Urinary catheter: self cath  Is there a malcolm order: No     LDAs:       Peripheral IV 11/08/22 Left Antecubital (Active)   Site Assessment Clean, dry, & intact 11/08/22 2233   Phlebitis Assessment 0 11/08/22 2233   Infiltration Assessment 0 11/08/22 2233   Dressing Status Clean, dry, & intact 11/08/22 2233   Dressing Type Transparent;Tape 11/08/22 2233   Hub Color/Line Status Pink;Patent; Flushed; Infusing 11/08/22 2233   Action Taken Blood drawn 11/08/22 2233   Alcohol Cap Used No 11/08/22 2233         Opportunity for questions and clarification was provided.     Sylvia Banerjee RN

## 2022-11-09 NOTE — PROGRESS NOTES
adEnd of Shift Note    Bedside shift change report given to Aaron Kat (oncoming nurse) by Dora Aly RN (offgoing nurse). Report included the following information SBAR, Kardex, and MAR    Shift worked:  7a-7p     Shift summary and any significant changes:    VS and shift assessment completed. Schedule meds given along w education. Hourly rounds. Pt self cath every 7 hours. Kit provided.

## 2022-11-09 NOTE — ED NOTES
Bedside shift change report given to [address] [place] [address] Kel (oncoming nurse) by Giana Camarena (offgoing nurse). Report included the following information SBAR, Kardex, ED Summary, Intake/Output, MAR, and Recent Results.
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
## 2022-11-09 NOTE — PROGRESS NOTES
Pharmacy Automatic Renal Dosing Protocol - Antimicrobials    Indication for Antimicrobials: Possible osteomyelitis or infected sacral ulcer           Current Regimen of Each Antimicrobial:  Vancomycin 1250 mg x 1 then 1000 mg Q8H (Start Date ; Day # 1)  Cefepime 2 g Q8H (Start , Day 1)    Previous Antimicrobial Therapy:    Vancomycin Goal AUC Level: 400-600 mg*hour/liter per 24 hours    Vancomycin Levels  Date Dose & Interval Measured (mcg/mL) Steady State (mcg/mL)                       Date & time of next level:     Significant Cultures:     Radiology / Imaging results: (X-ray, CT scan or MRI):       Labs:  Recent Labs     22  1755   CREA 0.63*   BUN 22*   WBC 7.5     Temp (24hrs), Av.2 °F (36.8 °C), Min:98.2 °F (36.8 °C), Max:98.2 °F (36.8 °C)      Paralysis, amputations, malnutrition: Paraplegia  Creatinine Clearance (mL/min) or Dialysis: 105    Impression/Plan:   Antibiotic as above  Vancomycin level on 11/10  [facility] daily     Pharmacy will follow daily and adjust medications as appropriate for renal function and/or serum levels. Thank you,  Juan Manuel Delgado, [facility]      Recommended duration of therapy  http://[facility]/[place]/[facility]/Pharmacy/Clinical%20Companion/Duration%20of%20ABX%20therapy. docx    Renal Dosing  http://[facility]/[place]/[facility]/Pharmacy/Clinical%20Companion/Renal%[id number]. pdf

## 2022-11-10 LAB
ANION GAP SERPL CALC-SCNC: 6 MMOL/L (ref 5–15)
BASOPHILS # BLD: 0 K/UL (ref 0–0.1)
BASOPHILS NFR BLD: 1 % (ref 0–1)
BUN SERPL-MCNC: 17 MG/DL (ref 6–20)
BUN/CREAT SERPL: 40 (ref 12–20)
CALCIUM SERPL-MCNC: 8.7 MG/DL (ref 8.5–10.1)
CHLORIDE SERPL-SCNC: 108 MMOL/L (ref 97–108)
CO2 SERPL-SCNC: 24 MMOL/L (ref 21–32)
CREAT SERPL-MCNC: 0.43 MG/DL (ref 0.7–1.3)
CRP SERPL-MCNC: 0.76 MG/DL (ref 0–0.6)
DIFFERENTIAL METHOD BLD: ABNORMAL
EOSINOPHIL # BLD: 0.3 K/UL (ref 0–0.4)
EOSINOPHIL NFR BLD: 7 % (ref 0–7)
ERYTHROCYTE [DISTWIDTH] IN BLOOD BY AUTOMATED COUNT: 19.8 % (ref 11.5–14.5)
ERYTHROCYTE [SEDIMENTATION RATE] IN BLOOD: 24 MM/HR (ref 0–15)
GLUCOSE SERPL-MCNC: 92 MG/DL (ref 65–100)
HCT VFR BLD AUTO: 35.9 % (ref 36.6–50.3)
HGB BLD-MCNC: 10.5 G/DL (ref 12.1–17)
IMM GRANULOCYTES # BLD AUTO: 0 K/UL (ref 0–0.04)
IMM GRANULOCYTES NFR BLD AUTO: 0 % (ref 0–0.5)
LYMPHOCYTES # BLD: 2 K/UL (ref 0.8–3.5)
LYMPHOCYTES NFR BLD: 56 % (ref 12–49)
MAGNESIUM SERPL-MCNC: 2.1 MG/DL (ref 1.6–2.4)
MCH RBC QN AUTO: 24.5 PG (ref 26–34)
MCHC RBC AUTO-ENTMCNC: 29.2 G/DL (ref 30–36.5)
MCV RBC AUTO: 83.9 FL (ref 80–99)
MONOCYTES # BLD: 0.3 K/UL (ref 0–1)
MONOCYTES NFR BLD: 8 % (ref 5–13)
NEUTS SEG # BLD: 1 K/UL (ref 1.8–8)
NEUTS SEG NFR BLD: 28 % (ref 32–75)
NRBC # BLD: 0 K/UL (ref 0–0.01)
NRBC BLD-RTO: 0 PER 100 WBC
PHOSPHATE SERPL-MCNC: 3.7 MG/DL (ref 2.6–4.7)
PLATELET # BLD AUTO: 305 K/UL (ref 150–400)
PMV BLD AUTO: 9.6 FL (ref 8.9–12.9)
POTASSIUM SERPL-SCNC: 4.2 MMOL/L (ref 3.5–5.1)
PROCALCITONIN SERPL-MCNC: <0.05 NG/ML
RBC # BLD AUTO: 4.28 M/UL (ref 4.1–5.7)
SODIUM SERPL-SCNC: 138 MMOL/L (ref 136–145)
VANCOMYCIN SERPL-MCNC: 18.4 UG/ML
WBC # BLD AUTO: 3.6 K/UL (ref 4.1–11.1)

## 2022-11-10 PROCEDURE — 36415 COLL VENOUS BLD VENIPUNCTURE: CPT

## 2022-11-10 PROCEDURE — 99222 1ST HOSP IP/OBS MODERATE 55: CPT | Performed by: SURGERY

## 2022-11-10 PROCEDURE — 96376 TX/PRO/DX INJ SAME DRUG ADON: CPT

## 2022-11-10 PROCEDURE — 74011000258 HC RX REV CODE- 258: Performed by: INTERNAL MEDICINE

## 2022-11-10 PROCEDURE — 74011250637 HC RX REV CODE- 250/637: Performed by: STUDENT IN AN ORGANIZED HEALTH CARE EDUCATION/TRAINING PROGRAM

## 2022-11-10 PROCEDURE — 85025 COMPLETE CBC W/AUTO DIFF WBC: CPT

## 2022-11-10 PROCEDURE — 84145 PROCALCITONIN (PCT): CPT

## 2022-11-10 PROCEDURE — 74011250637 HC RX REV CODE- 250/637: Performed by: PHYSICIAN ASSISTANT

## 2022-11-10 PROCEDURE — 85652 RBC SED RATE AUTOMATED: CPT

## 2022-11-10 PROCEDURE — 74011250636 HC RX REV CODE- 250/636: Performed by: STUDENT IN AN ORGANIZED HEALTH CARE EDUCATION/TRAINING PROGRAM

## 2022-11-10 PROCEDURE — 80048 BASIC METABOLIC PNL TOTAL CA: CPT

## 2022-11-10 PROCEDURE — G0378 HOSPITAL OBSERVATION PER HR: HCPCS

## 2022-11-10 PROCEDURE — 74011250636 HC RX REV CODE- 250/636: Performed by: INTERNAL MEDICINE

## 2022-11-10 PROCEDURE — 84100 ASSAY OF PHOSPHORUS: CPT

## 2022-11-10 PROCEDURE — 74011000250 HC RX REV CODE- 250: Performed by: INTERNAL MEDICINE

## 2022-11-10 PROCEDURE — 99223 1ST HOSP IP/OBS HIGH 75: CPT | Performed by: INTERNAL MEDICINE

## 2022-11-10 PROCEDURE — 74011250637 HC RX REV CODE- 250/637: Performed by: INTERNAL MEDICINE

## 2022-11-10 PROCEDURE — 80202 ASSAY OF VANCOMYCIN: CPT

## 2022-11-10 PROCEDURE — 86140 C-REACTIVE PROTEIN: CPT

## 2022-11-10 PROCEDURE — 83735 ASSAY OF MAGNESIUM: CPT

## 2022-11-10 RX ADMIN — CEFEPIME 2 G: 2 INJECTION, POWDER, FOR SOLUTION INTRAVENOUS at 05:00

## 2022-11-10 RX ADMIN — OXYCODONE 5 MG: 5 TABLET ORAL at 07:45

## 2022-11-10 RX ADMIN — VANCOMYCIN HYDROCHLORIDE 1000 MG: 1 INJECTION, POWDER, LYOPHILIZED, FOR SOLUTION INTRAVENOUS at 00:37

## 2022-11-10 RX ADMIN — OXYCODONE 5 MG: 5 TABLET ORAL at 20:09

## 2022-11-10 RX ADMIN — VANCOMYCIN HYDROCHLORIDE 1000 MG: 1 INJECTION, POWDER, LYOPHILIZED, FOR SOLUTION INTRAVENOUS at 06:00

## 2022-11-10 RX ADMIN — CEFEPIME 2 G: 2 INJECTION, POWDER, FOR SOLUTION INTRAVENOUS at 20:09

## 2022-11-10 RX ADMIN — GABAPENTIN 100 MG: 100 CAPSULE ORAL at 22:49

## 2022-11-10 RX ADMIN — SODIUM CHLORIDE, PRESERVATIVE FREE 10 ML: 5 INJECTION INTRAVENOUS at 13:00

## 2022-11-10 RX ADMIN — OXYCODONE 5 MG: 5 TABLET ORAL at 16:38

## 2022-11-10 RX ADMIN — GABAPENTIN 100 MG: 100 CAPSULE ORAL at 16:38

## 2022-11-10 RX ADMIN — SODIUM CHLORIDE, PRESERVATIVE FREE 10 ML: 5 INJECTION INTRAVENOUS at 07:46

## 2022-11-10 RX ADMIN — CEFEPIME 2 G: 2 INJECTION, POWDER, FOR SOLUTION INTRAVENOUS at 12:10

## 2022-11-10 RX ADMIN — VANCOMYCIN HYDROCHLORIDE 1000 MG: 1 INJECTION, POWDER, LYOPHILIZED, FOR SOLUTION INTRAVENOUS at 09:51

## 2022-11-10 RX ADMIN — GABAPENTIN 100 MG: 100 CAPSULE ORAL at 09:51

## 2022-11-10 RX ADMIN — OXYCODONE 5 MG: 5 TABLET ORAL at 12:10

## 2022-11-10 RX ADMIN — ESCITALOPRAM OXALATE 10 MG: 10 TABLET ORAL at 09:51

## 2022-11-10 RX ADMIN — SODIUM CHLORIDE, PRESERVATIVE FREE 10 ML: 5 INJECTION INTRAVENOUS at 22:49

## 2022-11-10 RX ADMIN — VANCOMYCIN HYDROCHLORIDE 1000 MG: 1 INJECTION, POWDER, LYOPHILIZED, FOR SOLUTION INTRAVENOUS at 16:39

## 2022-11-10 NOTE — PROGRESS NOTES
Bedside shift change report given to Reynaldo Cruz RN (oncoming nurse) by Charles Argueta RN (offgoing nurse). Report included the following information SBAR, Kardex, ED Summary, Procedure Summary, Intake/Output, MAR, and Recent Results.

## 2022-11-10 NOTE — PROGRESS NOTES
Pharmacy Automatic Renal Dosing Protocol - Antimicrobials    Indication for Antimicrobials: Possible osteomyelitis or infected sacral ulcer     Current Regimen of Each Antimicrobial:  Vancomycin 1000 mg IV Q8H (Start Date ; Day # 3)  Cefepime 2 g Q8H (Start , Day 3)    Previous Antimicrobial Therapy:    Vancomycin Goal AUC Level: 400-600 mg*hour/liter per 24 hours    Vancomycin Levels  Date Dose & Interval Measured (mcg/mL) Steady State (mcg/mL)   11/10 0423 1000 mg q8h 18.4                  Date & time of next level:     Significant Cultures:    wound - HEAVY STAPHYLOCOCCUS SPECIES Abnormal - pending    Radiology / Imaging results: (X-ray, CT scan or MRI):       Labs:  Recent Labs     11/10/22  0423 22  0324 22  1755   CREA 0.43* 0.57* 0.63*   BUN 17 27* 22*   WBC 3.6* 5.8 7.5       Paralysis, amputations, malnutrition: Paraplegia      Temp (24hrs), Av.2 °F (36.8 °C), Min:97.3 °F (36.3 °C), Max:98.7 °F (37.1 °C)      Creatinine Clearance (mL/min):   CrCl (Adjusted Body Weight): 158.7 mL/min   If actual weight < IBW: CrCl (Actual Body Weight) 133.4        Impression/Plan:   Updated insight. Recommend continue vancomycin 1000 mg IV q8h for anticipated AUC of 511 and trough of 14.9  Continue cefepime  BMP daily  Stop date - TBD     Pharmacy will follow daily and adjust medications as appropriate for renal function and/or serum levels. Thank you,  Art Brown, PHARMD      Recommended duration of therapy  http://[facility]/Pharmacy/Clinical%20Companion/Duration%20of%20ABX%20therapy. docx    Renal Dosing  http://[facility]/[place]/[facility]/Pharmacy/Clinical%20Companion/Renal%[id number]. pdf
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
## 2022-11-10 NOTE — PROGRESS NOTES
Hospitalist Progress Note    NAME:  Isaiah Nageotte   :  1995   MRN:  [id number]     Assessment / Plan:  Bilateral lower extremity pain  Paraplegia  -Check CT of abdomen with attention to lumbar spine  -XR showing chronic shrapnel at L2-3 and destructive changes at R hip  -CT abd pelvis showing chronic soft tissue thickening and inflammatory changes around R hip, with surrounding ossification  - started gabapentin 100 tid  -Start ibuprofen for pain control  - discuss Chronic Osteo team at [address] [place] [address] with Ortho here 11/10/22    Sacral/ischial ulcers  Chronic osteomyelitis  -Patient does have a history of infected sacral ulcers and also has chronic osteomyelitis  -Per ER physician, wounds appear slightly infected and requested admission to the hospital  -Start empiric antibiotics with vancomycin and cefepime  -Consulted wound care  - consult ID for antibiotic recommendations in a potentially orthopedic surgery situation  - consult General Surgery to determine if patient requires other surgical intervention ie diverting colostomy to keep wounds clean. Will discuss [facility] chronic osteo team    History of paraplegia  Depression  -Continue supportive care     Code Status: Full code  Surrogate Decision Maker: Mother     DVT Prophylaxis: Lovenox  YUE: 11/10/22  Barriers: support at home? Consutled case management. Surgery and ID consult. Baseline: From home, independent of ADLs     Subjective:     Chief Complaint  Lower extremity pain    Subjective  Discussed with RN events overnight. Patient with persistent lower extremity pain bilaterally. No other complaints. Review of Systems:  14 point ROS reviewed with patient and negative except per above. Objective:     VITALS:   Last 24hrs VS reviewed since prior progress note.  Most recent are:  Patient Vitals for the past 24 hrs:   Temp Pulse Resp BP SpO2   22 1542 98.5 °F (36.9 °C) 67 16 124/72 100 %   22 1318 98.4 °F (36.9 °C) 65 17 123/76 100 %   22 1144 98.4 °F (36.9 °C) (!) 59 13 117/72 98 %   11/09/22 0833 98.6 °F (37 °C) (!) 57 11 95/61 98 %   11/09/22 0400 98.7 °F (37.1 °C) 84 15 (!) 129/95 --   11/09/22 0000 98.1 °F (36.7 °C) 86 11 114/70 98 %   11/08/22 2233 98.2 °F (36.8 °C) 61 11 110/71 97 %   11/08/22 1940 -- 80 -- 116/72 97 %   11/08/22 1925 -- -- -- 115/79 97 %       Intake/Output Summary (Last 24 hours) at 11/9/2022 1921  Last data filed at 11/9/2022 1035  Gross per 24 hour   Intake --   Output 900 ml   Net -900 ml        I had a face to face encounter and independently examined this patient on 11/9/2022, as outlined below:    PHYSICAL EXAM:  General:   No acute distress, Answering questions appropriately  HEENT:  EOMI, Anicteric sclera. MMM  Neck:   Supple  Resp:    CTAB, non-labored, No wheezes or crackles  Cardio:  regular rate, normal rhythm, no murmurs, no JVD  GI:    Soft, Non-tender, Non-distended, Normal bowel sounds. Extremities:  Warm, well perfused, no LE edema, pulses 2+ and symmetric. Wounds noted, please see wound care notes. Skin:    Warm, Dry, Pink, Intact  Neurologic:   Alert and Oriented x4, No focal defects    Reviewed most current lab test results and cultures  YES  Reviewed most current radiology test results   YES  Review and summation of old records today    NO  Reviewed patient's current orders and MAR    YES  PMH/SH reviewed - no change compared to H&P  ______________________________________________________________________    Procedures: see electronic medical records for all procedures/Xrays and details which were not copied into this note but were reviewed prior to creation of Plan. LABS:  I reviewed today's most current labs and imaging studies.   Pertinent labs include:  Recent Labs     11/09/22 0324 11/08/22  1755   WBC 5.8 7.5   HGB 10.3* 12.0*   HCT 35.4* 39.2    372     Recent Labs     11/09/22 0324 11/08/22  1755    139   K 4.1 4.1    107   CO2 24 26   GLU 93 83   BUN 27* 22*   CREA 0.57* 0.63*   CA 9.0 9.3   ALB  --  3.5   TBILI  --  0.2   ALT  --  14       Imaging/Diagnostics:  CT ABD PELV W CONT  Narrative: EXAM: CT ABD PELV W CONT    INDICATION: infectcted sacral ulcer, look at lumbar spine for any fractures    COMPARISON: CT abdomen pelvis July 21, 2022     CONTRAST: 100 mL of Isovue-370. ORAL CONTRAST: None    TECHNIQUE:   Following the uneventful intravenous administration of contrast, thin axial  images were obtained through the abdomen and pelvis. Coronal and sagittal  reconstructions were generated. CT dose reduction was achieved through use of a  standardized protocol tailored for this examination and automatic exposure  control for dose modulation. FINDINGS:   LOWER THORAX: No significant abnormality in the incidentally imaged lower chest.  LIVER: No mass. BILIARY TREE: Gallbladder is within normal limits. CBD is not dilated. SPLEEN: within normal limits. PANCREAS: No mass or ductal dilatation. ADRENALS: Unremarkable. KIDNEYS: No mass, calculus, or hydronephrosis. STOMACH: Unremarkable. SMALL BOWEL: No dilatation or wall thickening. COLON: No dilatation or wall thickening. Moderate to large volume of stool  throughout the entire length of the colon. APPENDIX: Unremarkable. PERITONEUM: No ascites or pneumoperitoneum. RETROPERITONEUM: No lymphadenopathy or aortic aneurysm. REPRODUCTIVE ORGANS: Normal prostate. URINARY BLADDER: Diffuse gallbladder wall thickening. BONES: Decubitus ulcer of the sacrum, with underlying bony sclerotic change,  overall unchanged compared to July 21. Decubitus ulcers overlying the bilateral  initial tuberosities, with underlying bony sclerotic change and chronic erosive  change, similar to July 21. The right femoral head is absent, there is prominent  surrounding heterotopic ossification. There is thickening of the right hip joint  capsule, and surrounding soft tissue swelling without discrete fluid collection.     Redemonstrated hypodensity in the spinal canal at the level of L2-3. No acute  lumbar spine fracture. 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 ABDOMINAL WALL: No mass or hernia. ADDITIONAL COMMENTS: N/A  Impression: 1. Decubitus ulcers of the sacrum and bilateral ischial tuberosities with  underlying chronic osteomyelitis. 2. Chronic soft tissue thickening and inflammatory changes around the right hip,  without discrete fluid collection, overall slightly decreased in severity  compared to July 21. There is redemonstrated surrounding heterotopic  ossification. 3. Diffuse bladder wall thickening compatible with cystitis. 4. Moderate to large volume of stool throughout the entire length of the colon,  likely constipated. 5. No acute lumbar spine fracture. Stable chronic fractures of right-sided  transverse processes, and metallic fragments within the spinal canal the level  of L2-3. XR SPINE LUMB 2 OR 3 V  Narrative: EXAM: XR SPINE LUMB 2 OR 3 V    INDICATION: Sacral wounds, eval for osteo    COMPARISON: CT 7/21/2022    TECHNIQUE: AP, lateral and spot lateral views of the lumbar spine. FINDINGS: Metallic shrapnel is seen involving the canal at L2-3. This is  chronic. She to external to the patient create artifact overlying the spinal  lateral. Vertebral body heights are maintained. No acute fracture or  subluxation. There is mild disc space narrowing L5-S1. Extensive destructive  changes are again partially seen in the right hip. The pelvis is not imaged. Prior wounds were seen on CT with the ischial tuberosities and sacrum. Impression: 1. Metallic shrapnel chronically seen at L2-3.  2.  Sacrum and pelvis incompletely imaged. 3. Significant destructive changes again partially seen in the right hip.       Care Plan discussed with:    Comments   Patient y    Family      RN y    Care Manager     Consultant                       y Multidiciplinary team rounds were held today with , nursing, pharmacist and clinical coordinator. Patient's plan of care was discussed; medications were reviewed and discharge planning was addressed.      _    Signed: Tessie Sampson MD

## 2022-11-10 NOTE — PROGRESS NOTES
Hospitalist Progress Note    NAME:  Emilia Henderson   :  1995   MRN:  [id number]     Assessment / Plan:  Bilateral lower extremity pain  Paraplegia  -XR showing chronic shrapnel at L2-3 and destructive changes at R hip  -CT abd pelvis showing chronic soft tissue thickening and inflammatory changes around R hip, with surrounding ossification. Also showing no acute lumbar pathology. Showing stable chronic fractures of R-sided transverse process, with metallic fragments at M4-A9.  - started gabapentin 100 tid  -Start ibuprofen for pain control  - patient may benefit [place] [id number] ulcers  Chronic osteomyelitis  -Patient does have a history of infected sacral ulcers and also has chronic osteomyelitis  -Per ER physician, wounds appear slightly infected and requested admission to the hospital  -Start empiric antibiotics with vancomycin and cefepime  -Consulted wound care  - consult ID for antibiotic recommendations in a potentially non-surgical situation  - consulted General Surgery to determine if patient requires other surgical intervention ie diverting colostomy to keep wounds clean. Appreciate assistance. Patient not interested in colostomy. History of paraplegia  Depression  -Continue supportive care     Code Status: Full code  Surrogate Decision Maker: Mother     DVT Prophylaxis: Lovenox  YUE: 23  Barriers: support at home? Consutled case management. ID consult. Baseline: From home, independent of ADLs     Subjective:     Chief Complaint  Lower extremity pain    Subjective  Discussed with RN events overnight. Patient has no complaints today. Review of Systems:  14 point ROS reviewed with patient and negative except per above. Objective:     VITALS:   Last 24hrs VS reviewed since prior progress note.  Most recent are:  Patient Vitals for the past 24 hrs:   Temp Pulse Resp BP SpO2   11/10/22 0711 98 °F (36.7 °C) 68 16 (!) 112/90 98 %   22 2349 97.3 °F (36.3 °C) 74 18 125/69 100 %   11/09/22 1937 98.7 °F (37.1 °C) 68 17 126/68 99 %         Intake/Output Summary (Last 24 hours) at 11/10/2022 1607  Last data filed at 11/10/2022 3187  Gross per 24 hour   Intake --   Output 1350 ml   Net -1350 ml          I had a face to face encounter and independently examined this patient on 11/10/2022, as outlined below:    PHYSICAL EXAM:  General:   No acute distress, Answering questions appropriately  HEENT:  EOMI, Anicteric sclera. MMM  Neck:   Supple  Resp:    CTAB, non-labored, No wheezes or crackles  Cardio:  regular rate, normal rhythm, no murmurs, no JVD  GI:    Soft, Non-tender, Non-distended, Normal bowel sounds. Extremities:  Warm, well perfused, no LE edema, pulses 2+ and symmetric. Wounds noted, please see wound care notes. Skin:    Warm, Dry, Pink, Intact  Neurologic:   Alert and Oriented x4, No focal defects    Reviewed most current lab test results and cultures  YES  Reviewed most current radiology test results   YES  Review and summation of old records today    NO  Reviewed patient's current orders and MAR    YES  PMH/SH reviewed - no change compared to H&P  ______________________________________________________________________    Procedures: see electronic medical records for all procedures/Xrays and details which were not copied into this note but were reviewed prior to creation of Plan. LABS:  I reviewed today's most current labs and imaging studies.   Pertinent labs include:  Recent Labs     11/10/22  0423 11/09/22  0324 11/08/22  1755   WBC 3.6* 5.8 7.5   HGB 10.5* 10.3* 12.0*   HCT 35.9* 35.4* 39.2    307 372       Recent Labs     11/10/22  0423 11/09/22  0324 11/08/22  1755    137 139   K 4.2 4.1 4.1    107 107   CO2 24 24 26   GLU 92 93 83   BUN 17 27* 22*   CREA 0.43* 0.57* 0.63*   CA 8.7 9.0 9.3   MG 2.1  --   --    PHOS 3.7  --   --    ALB  --   --  3.5   TBILI  --   --  0.2   ALT  --   --  14         Imaging/Diagnostics:  CT ABD PELV W CONT  Narrative: EXAM: CT ABD PELV W CONT    INDICATION: infectcted sacral ulcer, look at lumbar spine for any fractures    COMPARISON: CT abdomen pelvis July 21, 2022     CONTRAST: 100 mL of Isovue-370. ORAL CONTRAST: None    TECHNIQUE:   Following the uneventful intravenous administration of contrast, thin axial  images were obtained through the abdomen and pelvis. Coronal and sagittal  reconstructions were generated. CT dose reduction was achieved through use of a  standardized protocol tailored for this examination and automatic exposure  control for dose modulation. FINDINGS:   LOWER THORAX: No significant abnormality in the incidentally imaged lower chest.  LIVER: No mass. BILIARY TREE: Gallbladder is within normal limits. CBD is not dilated. SPLEEN: within normal limits. PANCREAS: No mass or ductal dilatation. ADRENALS: Unremarkable. KIDNEYS: No mass, calculus, or hydronephrosis. STOMACH: Unremarkable. SMALL BOWEL: No dilatation or wall thickening. COLON: No dilatation or wall thickening. Moderate to large volume of stool  throughout the entire length of the colon. APPENDIX: Unremarkable. PERITONEUM: No ascites or pneumoperitoneum. RETROPERITONEUM: No lymphadenopathy or aortic aneurysm. REPRODUCTIVE ORGANS: Normal prostate. URINARY BLADDER: Diffuse gallbladder wall thickening. BONES: Decubitus ulcer of the sacrum, with underlying bony sclerotic change,  overall unchanged compared to July 21. Decubitus ulcers overlying the bilateral  initial tuberosities, with underlying bony sclerotic change and chronic erosive  change, similar to July 21. The right femoral head is absent, there is prominent  surrounding heterotopic ossification. There is thickening of the right hip joint  capsule, and surrounding soft tissue swelling without discrete fluid collection. Redemonstrated hypodensity in the spinal canal at the level of L2-3. No acute  lumbar spine fracture.  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 ABDOMINAL WALL: No mass or hernia. ADDITIONAL COMMENTS: N/A  Impression: 1. Decubitus ulcers of the sacrum and bilateral ischial tuberosities with  underlying chronic osteomyelitis. 2. Chronic soft tissue thickening and inflammatory changes around the right hip,  without discrete fluid collection, overall slightly decreased in severity  compared to July 21. There is redemonstrated surrounding heterotopic  ossification. 3. Diffuse bladder wall thickening compatible with cystitis. 4. Moderate to large volume of stool throughout the entire length of the colon,  likely constipated. 5. No acute lumbar spine fracture. Stable chronic fractures of right-sided  transverse processes, and metallic fragments within the spinal canal the level  of L2-3. XR SPINE LUMB 2 OR 3 V  Narrative: EXAM: XR SPINE LUMB 2 OR 3 V    INDICATION: Sacral wounds, eval for osteo    COMPARISON: CT 7/21/2022    TECHNIQUE: AP, lateral and spot lateral views of the lumbar spine. FINDINGS: Metallic shrapnel is seen involving the canal at L2-3. This is  chronic. She to external to the patient create artifact overlying the spinal  lateral. Vertebral body heights are maintained. No acute fracture or  subluxation. There is mild disc space narrowing L5-S1. Extensive destructive  changes are again partially seen in the right hip. The pelvis is not imaged. Prior wounds were seen on CT with the ischial tuberosities and sacrum. Impression: 1. Metallic shrapnel chronically seen at L2-3.  2.  Sacrum and pelvis incompletely imaged. 3. Significant destructive changes again partially seen in the right hip. Care Plan discussed with:    Comments   Patient y    Family      RN y    Care Manager     Consultant                       y Multidiciplinary team rounds were held today with , nursing, pharmacist and clinical coordinator.   Patient's plan of care was discussed; medications were reviewed and discharge planning was addressed.      _    Signed: Selene Phillip MD

## 2022-11-10 NOTE — PROGRESS NOTES
Joss of Shift Note    Bedside shift change report given to Briseida (oncoming nurse) by Farzaneh Mosquera RN (offgoing nurse). Report included the following information SBAR, Kardex, and MAR    Shift worked:  7a-3p     Shift summary and any significant changes:    VS and shift assessment performed. Meds given a long w education. Hourly rounds. Self cath 1x today.

## 2022-11-11 VITALS
OXYGEN SATURATION: 98 % | WEIGHT: 130 LBS | HEIGHT: 72 IN | HEART RATE: 70 BPM | RESPIRATION RATE: 18 BRPM | SYSTOLIC BLOOD PRESSURE: 119 MMHG | BODY MASS INDEX: 17.61 KG/M2 | TEMPERATURE: 98.6 F | DIASTOLIC BLOOD PRESSURE: 73 MMHG

## 2022-11-11 LAB
ANION GAP SERPL CALC-SCNC: 6 MMOL/L (ref 5–15)
BACTERIA SPEC CULT: ABNORMAL
BACTERIA SPEC CULT: ABNORMAL
BASOPHILS # BLD: 0 K/UL (ref 0–0.1)
BASOPHILS NFR BLD: 1 % (ref 0–1)
BUN SERPL-MCNC: 15 MG/DL (ref 6–20)
BUN/CREAT SERPL: 36 (ref 12–20)
CALCIUM SERPL-MCNC: 8.9 MG/DL (ref 8.5–10.1)
CHLORIDE SERPL-SCNC: 108 MMOL/L (ref 97–108)
CO2 SERPL-SCNC: 25 MMOL/L (ref 21–32)
CREAT SERPL-MCNC: 0.42 MG/DL (ref 0.7–1.3)
DIFFERENTIAL METHOD BLD: ABNORMAL
EOSINOPHIL # BLD: 0.2 K/UL (ref 0–0.4)
EOSINOPHIL NFR BLD: 5 % (ref 0–7)
ERYTHROCYTE [DISTWIDTH] IN BLOOD BY AUTOMATED COUNT: 19.4 % (ref 11.5–14.5)
GLUCOSE SERPL-MCNC: 89 MG/DL (ref 65–100)
GRAM STN SPEC: ABNORMAL
HCT VFR BLD AUTO: 35.7 % (ref 36.6–50.3)
HGB BLD-MCNC: 10.4 G/DL (ref 12.1–17)
IMM GRANULOCYTES # BLD AUTO: 0 K/UL (ref 0–0.04)
IMM GRANULOCYTES NFR BLD AUTO: 1 % (ref 0–0.5)
LYMPHOCYTES # BLD: 2.2 K/UL (ref 0.8–3.5)
LYMPHOCYTES NFR BLD: 49 % (ref 12–49)
MAGNESIUM SERPL-MCNC: 2.1 MG/DL (ref 1.6–2.4)
MCH RBC QN AUTO: 24.5 PG (ref 26–34)
MCHC RBC AUTO-ENTMCNC: 29.1 G/DL (ref 30–36.5)
MCV RBC AUTO: 84.2 FL (ref 80–99)
MONOCYTES # BLD: 0.4 K/UL (ref 0–1)
MONOCYTES NFR BLD: 9 % (ref 5–13)
NEUTS SEG # BLD: 1.5 K/UL (ref 1.8–8)
NEUTS SEG NFR BLD: 35 % (ref 32–75)
NRBC # BLD: 0 K/UL (ref 0–0.01)
NRBC BLD-RTO: 0 PER 100 WBC
PHOSPHATE SERPL-MCNC: 3.1 MG/DL (ref 2.6–4.7)
PLATELET # BLD AUTO: 326 K/UL (ref 150–400)
PMV BLD AUTO: 9.7 FL (ref 8.9–12.9)
POTASSIUM SERPL-SCNC: 4 MMOL/L (ref 3.5–5.1)
RBC # BLD AUTO: 4.24 M/UL (ref 4.1–5.7)
SERVICE CMNT-IMP: ABNORMAL
SODIUM SERPL-SCNC: 139 MMOL/L (ref 136–145)
WBC # BLD AUTO: 4.2 K/UL (ref 4.1–11.1)

## 2022-11-11 PROCEDURE — 74011250636 HC RX REV CODE- 250/636: Performed by: INTERNAL MEDICINE

## 2022-11-11 PROCEDURE — 74011250636 HC RX REV CODE- 250/636: Performed by: STUDENT IN AN ORGANIZED HEALTH CARE EDUCATION/TRAINING PROGRAM

## 2022-11-11 PROCEDURE — 74011250637 HC RX REV CODE- 250/637: Performed by: STUDENT IN AN ORGANIZED HEALTH CARE EDUCATION/TRAINING PROGRAM

## 2022-11-11 PROCEDURE — 74011250637 HC RX REV CODE- 250/637: Performed by: INTERNAL MEDICINE

## 2022-11-11 PROCEDURE — 36415 COLL VENOUS BLD VENIPUNCTURE: CPT

## 2022-11-11 PROCEDURE — 83735 ASSAY OF MAGNESIUM: CPT

## 2022-11-11 PROCEDURE — 84100 ASSAY OF PHOSPHORUS: CPT

## 2022-11-11 PROCEDURE — G0378 HOSPITAL OBSERVATION PER HR: HCPCS

## 2022-11-11 PROCEDURE — 74011000258 HC RX REV CODE- 258: Performed by: INTERNAL MEDICINE

## 2022-11-11 PROCEDURE — 85025 COMPLETE CBC W/AUTO DIFF WBC: CPT

## 2022-11-11 PROCEDURE — 80048 BASIC METABOLIC PNL TOTAL CA: CPT

## 2022-11-11 PROCEDURE — 96376 TX/PRO/DX INJ SAME DRUG ADON: CPT

## 2022-11-11 PROCEDURE — 74011250637 HC RX REV CODE- 250/637: Performed by: PHYSICIAN ASSISTANT

## 2022-11-11 PROCEDURE — 74011000250 HC RX REV CODE- 250: Performed by: INTERNAL MEDICINE

## 2022-11-11 RX ORDER — CEPHALEXIN 500 MG/1
500 CAPSULE ORAL 4 TIMES DAILY
Qty: 28 CAPSULE | Refills: 0 | Status: SHIPPED | OUTPATIENT
Start: 2022-11-11 | End: 2022-11-18

## 2022-11-11 RX ORDER — NALOXONE HYDROCHLORIDE 4 MG/.1ML
SPRAY NASAL
Qty: 2 EACH | Refills: 0 | Status: SHIPPED | OUTPATIENT
Start: 2022-11-11

## 2022-11-11 RX ORDER — GABAPENTIN 100 MG/1
100 CAPSULE ORAL 3 TIMES DAILY
Qty: 90 CAPSULE | Refills: 0 | Status: SHIPPED | OUTPATIENT
Start: 2022-11-11

## 2022-11-11 RX ORDER — LEVOFLOXACIN 750 MG/1
750 TABLET ORAL DAILY
Qty: 7 TABLET | Refills: 0 | Status: SHIPPED | OUTPATIENT
Start: 2022-11-11

## 2022-11-11 RX ORDER — OXYCODONE HYDROCHLORIDE 5 MG/1
5 TABLET ORAL
Qty: 12 TABLET | Refills: 0 | Status: SHIPPED | OUTPATIENT
Start: 2022-11-11 | End: 2022-11-14

## 2022-11-11 RX ADMIN — GABAPENTIN 100 MG: 100 CAPSULE ORAL at 10:59

## 2022-11-11 RX ADMIN — SODIUM CHLORIDE, PRESERVATIVE FREE 10 ML: 5 INJECTION INTRAVENOUS at 08:00

## 2022-11-11 RX ADMIN — OXYCODONE 5 MG: 5 TABLET ORAL at 06:18

## 2022-11-11 RX ADMIN — VANCOMYCIN HYDROCHLORIDE 1000 MG: 1 INJECTION, POWDER, LYOPHILIZED, FOR SOLUTION INTRAVENOUS at 01:20

## 2022-11-11 RX ADMIN — ESCITALOPRAM OXALATE 10 MG: 10 TABLET ORAL at 10:59

## 2022-11-11 RX ADMIN — CEFEPIME 2 G: 2 INJECTION, POWDER, FOR SOLUTION INTRAVENOUS at 04:17

## 2022-11-11 RX ADMIN — OXYCODONE 5 MG: 5 TABLET ORAL at 01:28

## 2022-11-11 RX ADMIN — CEFEPIME 2 G: 2 INJECTION, POWDER, FOR SOLUTION INTRAVENOUS at 11:00

## 2022-11-11 NOTE — DISCHARGE INSTRUCTIONS
Wound Care: daily, cleanse with Vashe wound cleansing solution for 1 week starting 11/9/22 and then change to normal saline. Pack each wound with Vashe/normal saline moist gauze and then cover with a foam dressing.

## 2022-11-11 NOTE — PROGRESS NOTES
UPDATE: 12:36PM    CM informed that [facility] is able to service pt, with a start date on Tuesday. CM will inform pt of the following. HEIN Astudillo, [place]          UPDATE: 12:20PM    CM currently having difficult time finding [place] [id number] agency in network with pts insurance provider. CM completed room visit to verify the following. Pt provided CM with two contact numbers: Chris Ling ([phone]-EI left voicemail) and Christian Hokpins ([phone]-[facility] working number). CM will continue to find and contact pts wound care nurses. CM will continue to follow. [facility] Dies: [phone]    HIEN Astudillo,         INITIAL NOTE: CM aware that pt will d/c on today and transition home with family support. CM completed room visit with pt, to verify [place] [id number] agency. Pt is unaware of [place] [id number] agency that assist with servicing him at home with his wound care. CM colunga end referral [facility]. CM arranged transport for pt today a 12P with [facility]. CM will continue to follow up to verify pts [place] [id number] agency.     HIEN Astudillo, [address] [place] [address]
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
## 2022-11-12 NOTE — PROGRESS NOTES
I have reviewed discharge instructions with the patient. The patient verbalized understanding. Discharge medications reviewed with patient and appropriate educational materials and side effects teaching were provided. Follow-up appointments reviewed. Opportunity for questions and clarification was provided. Venous access removed without difficulty. Patient's belongings gathered and sent with patient. Patient is ready for discharge. Patient transported via hospital to home.      Mireya Escobedo RN

## 2022-11-12 NOTE — PROGRESS NOTES
Problem: Pressure Injury - Risk of  Goal: *Prevention of pressure injury  Description: Document Dimitri Scale and appropriate interventions in the flowsheet. Outcome: Progressing Towards Goal  Note: Pressure Injury Interventions:  Sensory Interventions: Assess changes in LOC    Moisture Interventions: Absorbent underpads    Activity Interventions: Pressure redistribution bed/mattress(bed type)    Mobility Interventions: Pressure redistribution bed/mattress (bed type)    Nutrition Interventions: Offer support with meals,snacks and hydration    Friction and Shear Interventions: Foam dressings/transparent film/skin sealants                Problem: Patient Education: Go to Patient Education Activity  Goal: Patient/Family Education  Outcome: Progressing Towards Goal     Problem: Falls - Risk of  Goal: *Absence of Falls  Description: Document Ezekiel Fall Risk and appropriate interventions in the flowsheet.   Outcome: Progressing Towards Goal  Note: Fall Risk Interventions:  Mobility Interventions: Patient to call before getting OOB         Medication Interventions: Patient to call before getting OOB    Elimination Interventions: Call light in reach              Problem: Patient Education: Go to Patient Education Activity  Goal: Patient/Family Education  Outcome: Progressing Towards Goal

## 2023-02-07 ENCOUNTER — HOSPITAL ENCOUNTER (INPATIENT)
Age: 28
LOS: 1 days | Discharge: ANOTHER HOSPITAL | End: 2023-02-08
Attending: PSYCHIATRY & NEUROLOGY | Admitting: PSYCHIATRY & NEUROLOGY
Payer: MEDICAID

## 2023-02-07 ENCOUNTER — HOSPITAL ENCOUNTER (EMERGENCY)
Age: 28
Discharge: PSYCHIATRIC OR SUBSTANCE USE FACILITY | End: 2023-02-07
Attending: STUDENT IN AN ORGANIZED HEALTH CARE EDUCATION/TRAINING PROGRAM
Payer: MEDICAID

## 2023-02-07 VITALS
TEMPERATURE: 97.6 F | HEIGHT: 72 IN | HEART RATE: 80 BPM | RESPIRATION RATE: 18 BRPM | SYSTOLIC BLOOD PRESSURE: 99 MMHG | DIASTOLIC BLOOD PRESSURE: 73 MMHG | WEIGHT: 135 LBS | BODY MASS INDEX: 18.28 KG/M2 | OXYGEN SATURATION: 98 %

## 2023-02-07 DIAGNOSIS — T39.1X2A SUICIDE ATTEMPT BY ACETAMINOPHEN OVERDOSE, INITIAL ENCOUNTER (HCC): Primary | ICD-10-CM

## 2023-02-07 DIAGNOSIS — G89.29 OTHER CHRONIC PAIN: ICD-10-CM

## 2023-02-07 LAB
ALBUMIN SERPL-MCNC: 3.2 G/DL (ref 3.5–5)
ALBUMIN/GLOB SERPL: 0.6 (ref 1.1–2.2)
ALP SERPL-CCNC: 81 U/L (ref 45–117)
ALT SERPL-CCNC: 12 U/L (ref 12–78)
AMPHET UR QL SCN: NEGATIVE
ANION GAP BLD CALC-SCNC: 3 (ref 10–20)
ANION GAP SERPL CALC-SCNC: 3 MMOL/L (ref 5–15)
APAP SERPL-MCNC: <2 UG/ML (ref 10–30)
AST SERPL-CCNC: 10 U/L (ref 15–37)
ATRIAL RATE: 81 BPM
BARBITURATES UR QL SCN: NEGATIVE
BASE EXCESS BLD CALC-SCNC: 1 MMOL/L
BASOPHILS # BLD: 0 K/UL (ref 0–0.1)
BASOPHILS NFR BLD: 0 % (ref 0–1)
BENZODIAZ UR QL: POSITIVE
BILIRUB SERPL-MCNC: 0.3 MG/DL (ref 0.2–1)
BUN SERPL-MCNC: 9 MG/DL (ref 6–20)
BUN/CREAT SERPL: 20 (ref 12–20)
CA-I BLD-MCNC: 1.15 MMOL/L (ref 1.12–1.32)
CALCIUM SERPL-MCNC: 8.9 MG/DL (ref 8.5–10.1)
CALCULATED P AXIS, ECG09: 70 DEGREES
CALCULATED R AXIS, ECG10: 56 DEGREES
CALCULATED T AXIS, ECG11: 72 DEGREES
CANNABINOIDS UR QL SCN: POSITIVE
CHLORIDE BLD-SCNC: 108 MMOL/L (ref 100–108)
CHLORIDE SERPL-SCNC: 107 MMOL/L (ref 97–108)
CO2 BLD-SCNC: 25 MMOL/L (ref 19–24)
CO2 SERPL-SCNC: 26 MMOL/L (ref 21–32)
COCAINE UR QL SCN: NEGATIVE
CREAT SERPL-MCNC: 0.46 MG/DL (ref 0.7–1.3)
CREAT UR-MCNC: 0.4 MG/DL (ref 0.6–1.3)
DIAGNOSIS, 93000: NORMAL
DIFFERENTIAL METHOD BLD: ABNORMAL
DRUG SCRN COMMENT,DRGCM: ABNORMAL
EOSINOPHIL # BLD: 0.1 K/UL (ref 0–0.4)
EOSINOPHIL NFR BLD: 1 % (ref 0–7)
ERYTHROCYTE [DISTWIDTH] IN BLOOD BY AUTOMATED COUNT: 18.1 % (ref 11.5–14.5)
ETHANOL SERPL-MCNC: <10 MG/DL
FLUAV RNA SPEC QL NAA+PROBE: NOT DETECTED
FLUBV RNA SPEC QL NAA+PROBE: NOT DETECTED
GLOBULIN SER CALC-MCNC: 5 G/DL (ref 2–4)
GLUCOSE BLD STRIP.AUTO-MCNC: 77 MG/DL (ref 74–106)
GLUCOSE SERPL-MCNC: 76 MG/DL (ref 65–100)
HCO3 BLDA-SCNC: 26 MMOL/L
HCT VFR BLD AUTO: 39.2 % (ref 36.6–50.3)
HGB BLD-MCNC: 11.9 G/DL (ref 12.1–17)
IMM GRANULOCYTES # BLD AUTO: 0 K/UL (ref 0–0.04)
IMM GRANULOCYTES NFR BLD AUTO: 0 % (ref 0–0.5)
INR PPP: 1.1 (ref 0.9–1.1)
LACTATE BLD-SCNC: 0.55 MMOL/L (ref 0.4–2)
LYMPHOCYTES # BLD: 2.3 K/UL (ref 0.8–3.5)
LYMPHOCYTES NFR BLD: 31 % (ref 12–49)
MCH RBC QN AUTO: 26.2 PG (ref 26–34)
MCHC RBC AUTO-ENTMCNC: 30.4 G/DL (ref 30–36.5)
MCV RBC AUTO: 86.3 FL (ref 80–99)
METHADONE UR QL: NEGATIVE
MONOCYTES # BLD: 0.4 K/UL (ref 0–1)
MONOCYTES NFR BLD: 5 % (ref 5–13)
NEUTS SEG # BLD: 4.6 K/UL (ref 1.8–8)
NEUTS SEG NFR BLD: 63 % (ref 32–75)
NRBC # BLD: 0 K/UL (ref 0–0.01)
NRBC BLD-RTO: 0 PER 100 WBC
OPIATES UR QL: NEGATIVE
P-R INTERVAL, ECG05: 146 MS
PCO2 BLDV: 40.2 MMHG (ref 41–51)
PCP UR QL: NEGATIVE
PH BLDV: 7.41 (ref 7.32–7.42)
PLATELET # BLD AUTO: 405 K/UL (ref 150–400)
PMV BLD AUTO: 9.4 FL (ref 8.9–12.9)
PO2 BLDV: 35 MMHG (ref 25–40)
POTASSIUM BLD-SCNC: 4.9 MMOL/L (ref 3.5–5.5)
POTASSIUM SERPL-SCNC: 3.7 MMOL/L (ref 3.5–5.1)
PROT SERPL-MCNC: 8.2 G/DL (ref 6.4–8.2)
PROTHROMBIN TIME: 11 SEC (ref 9–11.1)
Q-T INTERVAL, ECG07: 348 MS
QRS DURATION, ECG06: 86 MS
QTC CALCULATION (BEZET), ECG08: 404 MS
RBC # BLD AUTO: 4.54 M/UL (ref 4.1–5.7)
SALICYLATES SERPL-MCNC: 4.1 MG/DL (ref 2.8–20)
SAO2 % BLD: 68 %
SARS-COV-2 RNA RESP QL NAA+PROBE: NOT DETECTED
SODIUM BLD-SCNC: 136 MMOL/L (ref 136–145)
SODIUM SERPL-SCNC: 136 MMOL/L (ref 136–145)
SPECIMEN SITE: ABNORMAL
VENTRICULAR RATE, ECG03: 81 BPM
WBC # BLD AUTO: 7.4 K/UL (ref 4.1–11.1)

## 2023-02-07 PROCEDURE — 80307 DRUG TEST PRSMV CHEM ANLYZR: CPT

## 2023-02-07 PROCEDURE — 74011250637 HC RX REV CODE- 250/637: Performed by: STUDENT IN AN ORGANIZED HEALTH CARE EDUCATION/TRAINING PROGRAM

## 2023-02-07 PROCEDURE — 80053 COMPREHEN METABOLIC PANEL: CPT

## 2023-02-07 PROCEDURE — 90791 PSYCH DIAGNOSTIC EVALUATION: CPT

## 2023-02-07 PROCEDURE — 85025 COMPLETE CBC W/AUTO DIFF WBC: CPT

## 2023-02-07 PROCEDURE — 85610 PROTHROMBIN TIME: CPT

## 2023-02-07 PROCEDURE — 82077 ASSAY SPEC XCP UR&BREATH IA: CPT

## 2023-02-07 PROCEDURE — 82947 ASSAY GLUCOSE BLOOD QUANT: CPT

## 2023-02-07 PROCEDURE — 99285 EMERGENCY DEPT VISIT HI MDM: CPT

## 2023-02-07 PROCEDURE — 87636 SARSCOV2 & INF A&B AMP PRB: CPT

## 2023-02-07 PROCEDURE — 36415 COLL VENOUS BLD VENIPUNCTURE: CPT

## 2023-02-07 PROCEDURE — 80179 DRUG ASSAY SALICYLATE: CPT

## 2023-02-07 PROCEDURE — 65220000003 HC RM SEMIPRIVATE PSYCH

## 2023-02-07 PROCEDURE — 80143 DRUG ASSAY ACETAMINOPHEN: CPT

## 2023-02-07 RX ORDER — GABAPENTIN 100 MG/1
100 CAPSULE ORAL ONCE
Status: COMPLETED | OUTPATIENT
Start: 2023-02-07 | End: 2023-02-07

## 2023-02-07 RX ORDER — GABAPENTIN 100 MG/1
CAPSULE ORAL 3 TIMES DAILY
Status: CANCELLED | OUTPATIENT
Start: 2023-02-07

## 2023-02-07 RX ADMIN — GABAPENTIN 100 MG: 100 CAPSULE ORAL at 17:46

## 2023-02-07 RX ADMIN — GABAPENTIN 100 MG: 100 CAPSULE ORAL at 11:08

## 2023-02-07 NOTE — PROGRESS NOTES
BSMART assessment completed, and suicide risk level noted to be high. ED Provider/Dr Gillespie and Nurse/Aleena notified. Concerns not observed. Security/Off- has not been notified.

## 2023-02-07 NOTE — ED NOTES
Assumed patient care at this time. BSMART on screen evaluating patient. Suicide precautions in place. Constant observer at bedside. 1338: Poison control called. RN updated poison control on patient condition. 1612: Per MD and BSMART, plan for admission at [facility] details for transportation and room assignment at this time. 1741: Per Ennis Claude with [facility] Herb, patient has been accepted to [facility]. Awaiting bed to open before initiating transport. 1748: Patient requesting pain meds for pain 8/10 in BRIAN legs. Orders received from [facility] SANDSTONE MD for 100 mg gabapentin. Patient given meal tray. Constant observer at bedside. Suicide precautions in place. 1915: Bedside and Verbal shift change report given to Guero Bee (oncoming nurse) by Ben Bell RN (offgoing nurse). Report included the following information SBAR, ED Summary, Intake/Output, MAR, and Recent Results.
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
## 2023-02-07 NOTE — ED NOTES
Pt arrives to ED with SI and BLE pain. Pt is a paraplegic x5 years from [facility] and has chronic leg pain. Pt was taking Gabapentin in the past but reports \"that stopped working\". Pt took approximately 5 pills of unknown substance that he got from a friend yesterday and approximately 8 Tylenol PM. Last dose was 5pm 2/6/23. Pt reports he feels suicidal when the pain comes on. Charge RN notified.

## 2023-02-08 ENCOUNTER — HOSPITAL ENCOUNTER (INPATIENT)
Age: 28
LOS: 1 days | Discharge: HOME | End: 2023-02-09
Attending: INTERNAL MEDICINE | Admitting: INTERNAL MEDICINE
Payer: MEDICAID

## 2023-02-08 VITALS
HEIGHT: 72 IN | RESPIRATION RATE: 16 BRPM | TEMPERATURE: 98.5 F | BODY MASS INDEX: 18.31 KG/M2 | HEART RATE: 71 BPM | DIASTOLIC BLOOD PRESSURE: 50 MMHG | SYSTOLIC BLOOD PRESSURE: 105 MMHG

## 2023-02-08 PROBLEM — F29 PSYCHOSIS (HCC): Status: ACTIVE | Noted: 2023-02-08

## 2023-02-08 PROBLEM — R45.851 SUICIDAL IDEATION: Status: ACTIVE | Noted: 2023-02-08

## 2023-02-08 PROBLEM — L89.90 DECUBITUS ULCER: Status: ACTIVE | Noted: 2023-02-08

## 2023-02-08 PROBLEM — F32.9 MAJOR DEPRESSION: Status: ACTIVE | Noted: 2023-02-08

## 2023-02-08 PROCEDURE — 65270000029 HC RM PRIVATE

## 2023-02-08 PROCEDURE — G0378 HOSPITAL OBSERVATION PER HR: HCPCS

## 2023-02-08 PROCEDURE — 74011250637 HC RX REV CODE- 250/637: Performed by: PSYCHIATRY & NEUROLOGY

## 2023-02-08 RX ORDER — LORAZEPAM 1 MG/1
1 TABLET ORAL
Status: DISCONTINUED | OUTPATIENT
Start: 2023-02-08 | End: 2023-02-08

## 2023-02-08 RX ORDER — TRAZODONE HYDROCHLORIDE 50 MG/1
50 TABLET ORAL
Status: DISCONTINUED | OUTPATIENT
Start: 2023-02-08 | End: 2023-02-08 | Stop reason: HOSPADM

## 2023-02-08 RX ORDER — HYDROXYZINE 50 MG/1
50 TABLET, FILM COATED ORAL
Status: DISCONTINUED | OUTPATIENT
Start: 2023-02-08 | End: 2023-02-08 | Stop reason: HOSPADM

## 2023-02-08 RX ORDER — ADHESIVE BANDAGE
30 BANDAGE TOPICAL DAILY PRN
Status: DISCONTINUED | OUTPATIENT
Start: 2023-02-08 | End: 2023-02-08 | Stop reason: HOSPADM

## 2023-02-08 RX ORDER — ACETAMINOPHEN 650 MG/1
650 SUPPOSITORY RECTAL
Status: DISCONTINUED | OUTPATIENT
Start: 2023-02-08 | End: 2023-02-09 | Stop reason: HOSPADM

## 2023-02-08 RX ORDER — ONDANSETRON 2 MG/ML
4 INJECTION INTRAMUSCULAR; INTRAVENOUS
Status: DISCONTINUED | OUTPATIENT
Start: 2023-02-08 | End: 2023-02-09 | Stop reason: HOSPADM

## 2023-02-08 RX ORDER — IBUPROFEN 600 MG/1
600 TABLET ORAL
Status: DISCONTINUED | OUTPATIENT
Start: 2023-02-08 | End: 2023-02-08 | Stop reason: HOSPADM

## 2023-02-08 RX ORDER — LORAZEPAM 2 MG/ML
1 INJECTION INTRAMUSCULAR
Status: DISCONTINUED | OUTPATIENT
Start: 2023-02-08 | End: 2023-02-08 | Stop reason: HOSPADM

## 2023-02-08 RX ORDER — ESCITALOPRAM OXALATE 10 MG/1
10 TABLET ORAL DAILY
Status: DISCONTINUED | OUTPATIENT
Start: 2023-02-09 | End: 2023-02-09 | Stop reason: HOSPADM

## 2023-02-08 RX ORDER — SODIUM CHLORIDE 0.9 % (FLUSH) 0.9 %
5-40 SYRINGE (ML) INJECTION EVERY 8 HOURS
Status: DISCONTINUED | OUTPATIENT
Start: 2023-02-08 | End: 2023-02-09 | Stop reason: HOSPADM

## 2023-02-08 RX ORDER — ONDANSETRON 4 MG/1
4 TABLET, ORALLY DISINTEGRATING ORAL
Status: DISCONTINUED | OUTPATIENT
Start: 2023-02-08 | End: 2023-02-09 | Stop reason: HOSPADM

## 2023-02-08 RX ORDER — POLYETHYLENE GLYCOL 3350 17 G/17G
17 POWDER, FOR SOLUTION ORAL DAILY PRN
Status: DISCONTINUED | OUTPATIENT
Start: 2023-02-08 | End: 2023-02-09 | Stop reason: HOSPADM

## 2023-02-08 RX ORDER — ACETAMINOPHEN 325 MG/1
650 TABLET ORAL
Status: DISCONTINUED | OUTPATIENT
Start: 2023-02-08 | End: 2023-02-09 | Stop reason: HOSPADM

## 2023-02-08 RX ORDER — LORAZEPAM 1 MG/1
1 TABLET ORAL
Status: DISCONTINUED | OUTPATIENT
Start: 2023-02-08 | End: 2023-02-08 | Stop reason: HOSPADM

## 2023-02-08 RX ORDER — LORAZEPAM 2 MG/ML
1 INJECTION INTRAMUSCULAR
Status: DISCONTINUED | OUTPATIENT
Start: 2023-02-08 | End: 2023-02-08

## 2023-02-08 RX ORDER — SODIUM CHLORIDE 0.9 % (FLUSH) 0.9 %
5-40 SYRINGE (ML) INJECTION AS NEEDED
Status: DISCONTINUED | OUTPATIENT
Start: 2023-02-08 | End: 2023-02-09 | Stop reason: HOSPADM

## 2023-02-08 RX ORDER — HYDROXYZINE 50 MG/1
50 TABLET, FILM COATED ORAL
Status: DISCONTINUED | OUTPATIENT
Start: 2023-02-08 | End: 2023-02-08

## 2023-02-08 RX ORDER — ENOXAPARIN SODIUM 100 MG/ML
40 INJECTION SUBCUTANEOUS DAILY
Status: DISCONTINUED | OUTPATIENT
Start: 2023-02-09 | End: 2023-02-09 | Stop reason: HOSPADM

## 2023-02-08 RX ADMIN — TRAZODONE HYDROCHLORIDE 50 MG: 50 TABLET ORAL at 02:05

## 2023-02-08 RX ADMIN — IBUPROFEN 600 MG: 600 TABLET, FILM COATED ORAL at 02:05

## 2023-02-08 NOTE — PROGRESS NOTES
Comprehensive Nutrition Assessment    Type and Reason for Visit: Wound, Consult, Initial    Nutrition Recommendations/Plan:   Add Boost HP 2x/day (250 kcal, 20 g pro each)   Add enrike packet 2x/day  with beverage of choice   Continue w/ current diet order  Continue to monitor and document intake, weights in I/Os     Malnutrition Assessment:  Malnutrition Status:  Does not meet weight, intake criteria    Nutrition Assessment:    Admitted w/ suicidal ideations s/p suicide attempt by tylenol overdose. 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 Per chart review wt x 1yr 54.4 kg, showing increase (12%) x 1 yr. UBW =135#. Pt reports eating 4-5 meals/day PTA, denies weight changes x6 months. Reports eating all of meals since admisssion. Open to receiving ONS. Labs: Cr 0.46, AST 10, Alb 3.2, H/H 11.9, 39.2. Meds: prn lorazepam, prn m.o.m. prn trazodone. Nutrition Related Findings:    NFPE deferred at this time. -N/V/D/C. No chewing/swallowing issues. Wound Type: Stage IV (sacral, BL ischial)    Current Nutrition Intake & Therapies:  Average Meal Intake: %  Average Supplement Intake: None ordered  ADULT DIET Regular    Anthropometric Measures:  Height: 6' (182.9 cm)  Ideal Body Weight (IBW): 178 lbs (81 kg)  Current Body Wt:  61.2 kg (134 lb 14.7 oz), 75.8 % IBW. Bed scale  Current BMI (kg/m2): 18.3  Usual Body Weight: 61.2 kg (135 lb) (per pt)  % Weight Change (Calculated): -0.1  Weight Adjustment: Paraplegia  Total Amputation Percentage: 7.5  Adjusted Ideal Body Weight (lbs) (Calculated): 164.7  Adjusted Ideal Body Weight (kg) (Calculated): 74.86 kg  Adjusted % IBW (Calculated): 81.9  Adjusted BMI (Calculated): 19.7  BMI Category: Normal weight (BMI 18.5-24. 9)    Estimated Daily Nutrient Needs:  Energy Requirements Based On: Kcal/kg  Weight Used for Energy Requirements: Current  Energy (kcal/day): 0027-0446 kcal/day (30-35 kcal/kgwounds)  Weight Used for Protein Requirements: Current  Protein (g/day):  g/day (1.5-2.0 g/kg, wounds)  Method Used for Fluid Requirements: 1 ml/kcal  Fluid (ml/day): 3739-4492 ml/day    Nutrition Diagnosis:   Increased nutrient needs related to acute injury/trauma (multiple stage 4 PI) as evidenced by wounds    Nutrition Interventions:   Food and/or Nutrient Delivery: Continue current diet, Start oral nutrition supplement  Nutrition Education/Counseling: No recommendations at this time  Coordination of Nutrition Care: Continue to monitor while inpatient  Plan of Care discussed with: Pt    Goals:  Goals: Meet at least 75% of estimated needs, PO intake 75% or greater, by next RD assessment       Nutrition Monitoring and Evaluation:   Behavioral-Environmental Outcomes: None identified  Food/Nutrient Intake Outcomes: Food and nutrient intake, Supplement intake  Physical Signs/Symptoms Outcomes: Weight, Skin    Discharge Planning:     Too soon to determine, Continue oral nutrition supplement    Lisbeth Murry RD  Contact: Ext [phone] or via [employer]

## 2023-02-08 NOTE — BH NOTES
Pt to be discharged to medical floor  on 5W for pressure ulcers x3 per Len. Report called to [address] on 5W. Pt to be transferred to unit at 1430. Pt escorted off unit at 1440 to 5W. Pt denies SI/HI/AVH. Pt denies depression and anxiety. Pt reports chronic pain in both lower extremities from thigh to ankle.
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
## 2023-02-08 NOTE — GROUP NOTE
[facility]  GROUP DOCUMENTATION INDIVIDUAL                                                                          Group Therapy Note    Date: 2/8/2023    Group Start Time: 0930  Group End Time: 2253  Group Topic: Psychological Group    [facility] C[facility] MANAGEMENT    Veena Guan BSW IP  GROUP DOCUMENTATION GROUP    Group Therapy Note    The writer safety planned with the group and discussed the importance of having a plan for crisis. Attendees: 7/14       Attendance: Attended    Patient's Goal:  Attend Group     Interventions/techniques: Supported    Follows Directions: Followed directions    Interactions: Interacted appropriately    Mental Status: Calm and Flat    Behavior/appearance: Attentive, Cooperative, Disheveled, Grooming impaired, Needed prompting, and Withdrawn/quiet    Goals Achieved: Able to engage in interactions, Able to listen to others, Able to receive feedback, and Able to self-disclose      Additional Notes: The patient provided no verbal feedback to the group buut was supportive of his peers.       REHANA Lyons

## 2023-02-08 NOTE — BSMART NOTE
Patient has been accepted by Dr Katty Mcintyre to [facility] room 245. Report can be called at [phone].
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
## 2023-02-08 NOTE — ED NOTES
Report given to Catrachito Flower RN at [facility], room #061. # to RN is [phone]. Transport arranged for pt with [facility]. ETA is [phone]. Transport # is [id number].
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
## 2023-02-08 NOTE — ED NOTES
Pt now refusing to go to [facility] from [facility] notified. RN advised to contact [facility].

## 2023-02-08 NOTE — PROGRESS NOTES
Problem: Falls - Risk of  Goal: *Absence of Falls  Description: Document [place] Fall Risk and appropriate interventions in the flowsheet. Outcome: Progressing Towards Goal  Note: Fall Risk Interventions:  Mobility Interventions: Patient to call before getting OOB, PT Consult for mobility concerns, PT Consult for assist device competence, Utilize walker, cane, or other assistive device    Medication Interventions: Patient to call before getting OOB    Elimination Interventions: Stay With Me (per policy), Toileting schedule/hourly rounds    Problem: Suicide  Goal: *STG: Remains safe in hospital  Outcome: Progressing Towards Goal    -the pt remains safe; safety rounds made every 15 minutes. -pt remains safe; no self injurious behavior noted or reported.
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
## 2023-02-08 NOTE — PROGRESS NOTES
Problem: Pressure Injury - Risk of  Goal: *Prevention of pressure injury  Description: Document Dimitri Scale and appropriate interventions in the flowsheet. Outcome: Progressing Towards Goal  Note: Pressure Injury Interventions:  Sensory Interventions: Turn and reposition approx. every two hours (pillows and wedges if needed), Minimize linen layers         Activity Interventions: PT/OT evaluation, Increase time out of bed    Mobility Interventions: PT/OT evaluation, Turn and reposition approx. every two hours(pillow and wedges)    Nutrition Interventions: Document food/fluid/supplement intake    Friction and Shear Interventions: HOB 30 degrees or less, Transferring/repositioning devices, Trapeze to reposition                Problem: Patient Education: Go to Patient Education Activity  Goal: Patient/Family Education  Outcome: Progressing Towards Goal     Problem: Falls - Risk of  Goal: *Absence of Falls  Description: Document Jovan Ekaterina Fall Risk and appropriate interventions in the flowsheet.   Outcome: Progressing Towards Goal  Note: Fall Risk Interventions:                 Elimination Interventions: Call light in reach, Bed/chair exit alarm

## 2023-02-09 VITALS
TEMPERATURE: 98.5 F | RESPIRATION RATE: 16 BRPM | HEART RATE: 92 BPM | DIASTOLIC BLOOD PRESSURE: 81 MMHG | OXYGEN SATURATION: 97 % | SYSTOLIC BLOOD PRESSURE: 131 MMHG

## 2023-02-09 LAB
ANION GAP SERPL CALC-SCNC: 3 MMOL/L (ref 5–15)
BASOPHILS # BLD: 0 K/UL (ref 0–0.1)
BASOPHILS NFR BLD: 1 % (ref 0–1)
BUN SERPL-MCNC: 14 MG/DL (ref 6–20)
BUN/CREAT SERPL: 33 (ref 12–20)
CA-I BLD-MCNC: 9 MG/DL (ref 8.5–10.1)
CHLORIDE SERPL-SCNC: 107 MMOL/L (ref 97–108)
CO2 SERPL-SCNC: 26 MMOL/L (ref 21–32)
CREAT SERPL-MCNC: 0.43 MG/DL (ref 0.7–1.3)
DIFFERENTIAL METHOD BLD: ABNORMAL
EOSINOPHIL # BLD: 0.1 K/UL (ref 0–0.4)
EOSINOPHIL NFR BLD: 3 % (ref 0–7)
ERYTHROCYTE [DISTWIDTH] IN BLOOD BY AUTOMATED COUNT: 17.9 % (ref 11.5–14.5)
GLUCOSE SERPL-MCNC: 143 MG/DL (ref 65–100)
HCT VFR BLD AUTO: 38.6 % (ref 36.6–50.3)
HGB BLD-MCNC: 11.4 G/DL (ref 12.1–17)
IMM GRANULOCYTES # BLD AUTO: 0 K/UL (ref 0–0.04)
IMM GRANULOCYTES NFR BLD AUTO: 0 % (ref 0–0.5)
LYMPHOCYTES # BLD: 1.9 K/UL (ref 0.8–3.5)
LYMPHOCYTES NFR BLD: 51 % (ref 12–49)
MCH RBC QN AUTO: 25.6 PG (ref 26–34)
MCHC RBC AUTO-ENTMCNC: 29.5 G/DL (ref 30–36.5)
MCV RBC AUTO: 86.7 FL (ref 80–99)
MONOCYTES # BLD: 0.2 K/UL (ref 0–1)
MONOCYTES NFR BLD: 5 % (ref 5–13)
NEUTS SEG # BLD: 1.5 K/UL (ref 1.8–8)
NEUTS SEG NFR BLD: 40 % (ref 32–75)
NRBC # BLD: 0 K/UL (ref 0–0.01)
NRBC BLD-RTO: 0 PER 100 WBC
PLATELET # BLD AUTO: 374 K/UL (ref 150–400)
PMV BLD AUTO: 9.4 FL (ref 8.9–12.9)
POTASSIUM SERPL-SCNC: 3.6 MMOL/L (ref 3.5–5.1)
RBC # BLD AUTO: 4.45 M/UL (ref 4.1–5.7)
SODIUM SERPL-SCNC: 136 MMOL/L (ref 136–145)
WBC # BLD AUTO: 3.7 K/UL (ref 4.1–11.1)

## 2023-02-09 PROCEDURE — 85025 COMPLETE CBC W/AUTO DIFF WBC: CPT

## 2023-02-09 PROCEDURE — 36415 COLL VENOUS BLD VENIPUNCTURE: CPT

## 2023-02-09 PROCEDURE — 74011250636 HC RX REV CODE- 250/636: Performed by: INTERNAL MEDICINE

## 2023-02-09 PROCEDURE — 74011250637 HC RX REV CODE- 250/637: Performed by: INTERNAL MEDICINE

## 2023-02-09 PROCEDURE — 96372 THER/PROPH/DIAG INJ SC/IM: CPT

## 2023-02-09 PROCEDURE — 80048 BASIC METABOLIC PNL TOTAL CA: CPT

## 2023-02-09 PROCEDURE — G0378 HOSPITAL OBSERVATION PER HR: HCPCS

## 2023-02-09 RX ORDER — POLYETHYLENE GLYCOL 3350 17 G/17G
17 POWDER, FOR SOLUTION ORAL DAILY
Qty: 30 PACKET | Refills: 0 | Status: SHIPPED | OUTPATIENT
Start: 2023-02-09

## 2023-02-09 RX ADMIN — ENOXAPARIN SODIUM 40 MG: 100 INJECTION SUBCUTANEOUS at 09:11

## 2023-02-09 RX ADMIN — ESCITALOPRAM OXALATE 10 MG: 10 TABLET ORAL at 09:11

## 2023-02-09 NOTE — PROGRESS NOTES
Reason for Admission:   Psychosis                    RUR Score:  17%                PCP: First and Last name:   Darya Baxter MD     Name of Practice:    Are you a current patient: Yes/No:    Approximate date of last visit: 2-3 months   Can you participate in a virtual visit if needed:     Do you (patient/family) have any concerns for transition/discharge? No concerns              Plan for utilizing home health:  Declines     Current Advanced Directive/Advance Care Plan:  Full Code  CM confirmed patient is a Full Code    Healthcare Decision Maker:   Click here to complete [address] including selection of the Healthcare Decision Maker Relationship (ie \"Primary\")            Primary Decision Maker: Maritza Ashton - Other Relative - [phone]    Secondary Decision Maker: Ysabel Boo - Sister - [phone]    Transition of Care Plan:        Patient lives at home with his mother. Patient has a wheelchair, shower chair and hospital bed at home. Patient's mother provides all wound care and personal care when needed. Patient is able to assist with some care. Patient will need medicaid transport at discharge. Current Dispo: Home/self.

## 2023-02-09 NOTE — PROGRESS NOTES
Problem: Pressure Injury - Risk of  Goal: *Prevention of pressure injury  Description: Document Dimitri Scale and appropriate interventions in the flowsheet. Outcome: Progressing Towards Goal  Note: Pressure Injury Interventions:  Sensory Interventions: Turn and reposition approx. every two hours (pillows and wedges if needed)         Activity Interventions: PT/OT evaluation    Mobility Interventions: PT/OT evaluation, Turn and reposition approx. every two hours(pillow and wedges)    Nutrition Interventions: Document food/fluid/supplement intake    Friction and Shear Interventions: HOB 30 degrees or less, Transferring/repositioning devices                Problem: Patient Education: Go to Patient Education Activity  Goal: Patient/Family Education  Outcome: Progressing Towards Goal     Problem: Falls - Risk of  Goal: *Absence of Falls  Description: Document [place] Fall Risk and appropriate interventions in the flowsheet.   Outcome: Progressing Towards Goal  Note: Fall Risk Interventions:                 Elimination Interventions: Call light in reach, Bed/chair exit alarm              Problem: Patient Education: Go to Patient Education Activity  Goal: Patient/Family Education  Outcome: Progressing Towards Goal
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
## 2023-02-09 NOTE — PROGRESS NOTES
CM noted discharge order. Patient has no CM needs at discharge. Patient will return to address on facesheet. Patient will need Medicaid stretcher transport. Primary RN made aware. Discharge plan of care/case management plan validated with provider discharge order.
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
## 2023-02-09 NOTE — PROGRESS NOTES
Notified Dr Leslie Magaña about the need for a discharge summary to discharge the patient to home. Primary Nurse spoke with Dr Leslie Magaña on the phone. It was stated that the patient did not need a discharge summary and then primary nurse stated Dr Leslie Magaña was not here at the hospital. I continued to say to all parties the discharge summary was needed.
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
## 2023-04-26 ENCOUNTER — HOSPITAL ENCOUNTER (EMERGENCY)
Age: 28
Discharge: HOME | End: 2023-04-26
Attending: STUDENT IN AN ORGANIZED HEALTH CARE EDUCATION/TRAINING PROGRAM
Payer: MEDICAID

## 2023-04-26 ENCOUNTER — APPOINTMENT (OUTPATIENT)
Dept: CT IMAGING | Age: 28
End: 2023-04-26
Attending: STUDENT IN AN ORGANIZED HEALTH CARE EDUCATION/TRAINING PROGRAM
Payer: MEDICAID

## 2023-04-26 VITALS
SYSTOLIC BLOOD PRESSURE: 109 MMHG | WEIGHT: 130 LBS | BODY MASS INDEX: 17.61 KG/M2 | RESPIRATION RATE: 16 BRPM | HEART RATE: 91 BPM | DIASTOLIC BLOOD PRESSURE: 72 MMHG | HEIGHT: 72 IN | TEMPERATURE: 98.6 F | OXYGEN SATURATION: 99 %

## 2023-04-26 DIAGNOSIS — S09.90XA INJURY OF HEAD, INITIAL ENCOUNTER: Primary | ICD-10-CM

## 2023-04-26 PROCEDURE — 99284 EMERGENCY DEPT VISIT MOD MDM: CPT

## 2023-04-26 PROCEDURE — 70450 CT HEAD/BRAIN W/O DYE: CPT

## 2023-04-26 PROCEDURE — 74011250637 HC RX REV CODE- 250/637: Performed by: STUDENT IN AN ORGANIZED HEALTH CARE EDUCATION/TRAINING PROGRAM

## 2023-04-26 RX ORDER — ACETAMINOPHEN 325 MG/1
975 TABLET ORAL ONCE
Status: COMPLETED | OUTPATIENT
Start: 2023-04-26 | End: 2023-04-26

## 2023-04-26 RX ADMIN — ACETAMINOPHEN 975 MG: 325 TABLET ORAL at 18:09
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
## 2023-06-20 ENCOUNTER — HOSPITAL ENCOUNTER (EMERGENCY)
Facility: HOSPITAL | Age: 28
Discharge: HOME | End: 2023-06-20
Attending: EMERGENCY MEDICINE
Payer: COMMERCIAL

## 2023-06-20 VITALS
RESPIRATION RATE: 20 BRPM | TEMPERATURE: 98.3 F | OXYGEN SATURATION: 98 % | HEART RATE: 83 BPM | SYSTOLIC BLOOD PRESSURE: 111 MMHG | DIASTOLIC BLOOD PRESSURE: 59 MMHG

## 2023-06-20 DIAGNOSIS — L89.154 DECUBITUS ULCER OF SACRAL REGION, STAGE 4 (HCC): ICD-10-CM

## 2023-06-20 DIAGNOSIS — G82.20 PARAPLEGIC SPINAL PARALYSIS (HCC): ICD-10-CM

## 2023-06-20 DIAGNOSIS — L89.314 PRESSURE INJURY OF RIGHT BUTTOCK, STAGE 4 (HCC): Primary | ICD-10-CM

## 2023-06-20 DIAGNOSIS — L89.321 PRESSURE INJURY OF LEFT BUTTOCK, STAGE 1: ICD-10-CM

## 2023-06-20 DIAGNOSIS — M86.652 CHRONIC OSTEOMYELITIS OF LEFT PELVIC REGION (HCC): ICD-10-CM

## 2023-06-20 PROCEDURE — 99283 EMERGENCY DEPT VISIT LOW MDM: CPT

## 2023-06-20 PROCEDURE — 6370000000 HC RX 637 (ALT 250 FOR IP): Performed by: EMERGENCY MEDICINE

## 2023-06-20 RX ORDER — OXYCODONE HYDROCHLORIDE AND ACETAMINOPHEN 5; 325 MG/1; MG/1
2 TABLET ORAL
Status: COMPLETED | OUTPATIENT
Start: 2023-06-20 | End: 2023-06-20

## 2023-06-20 RX ORDER — OXYCODONE HYDROCHLORIDE AND ACETAMINOPHEN 5; 325 MG/1; MG/1
1 TABLET ORAL EVERY 6 HOURS PRN
Qty: 12 TABLET | Refills: 0 | Status: SHIPPED | OUTPATIENT
Start: 2023-06-20 | End: 2023-06-25

## 2023-06-20 RX ADMIN — OXYCODONE HYDROCHLORIDE AND ACETAMINOPHEN 2 TABLET: 5; 325 TABLET ORAL at 14:32

## 2023-06-20 ASSESSMENT — LIFESTYLE VARIABLES
HOW OFTEN DO YOU HAVE A DRINK CONTAINING ALCOHOL: NEVER
HOW MANY STANDARD DRINKS CONTAINING ALCOHOL DO YOU HAVE ON A TYPICAL DAY: PATIENT DOES NOT DRINK

## 2023-06-20 ASSESSMENT — PAIN - FUNCTIONAL ASSESSMENT: PAIN_FUNCTIONAL_ASSESSMENT: 0-10

## 2023-06-20 ASSESSMENT — PAIN SCALES - GENERAL
PAINLEVEL_OUTOF10: 10
PAINLEVEL_OUTOF10: 10

## 2023-06-20 NOTE — DISCHARGE INSTRUCTIONS
Your examination and vital signs today are reassuring, and I would not recommend antibiotics at this time. If you have worsening pain or fevers (temperature greater than 100.4 degrees) consider returning to the ED for evaluation.
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
## 2023-06-21 ENCOUNTER — HOSPITAL ENCOUNTER (EMERGENCY)
Facility: HOSPITAL | Age: 28
Discharge: HOME | End: 2023-06-21
Attending: STUDENT IN AN ORGANIZED HEALTH CARE EDUCATION/TRAINING PROGRAM
Payer: COMMERCIAL

## 2023-06-21 VITALS
TEMPERATURE: 98.2 F | BODY MASS INDEX: 17.61 KG/M2 | WEIGHT: 130 LBS | OXYGEN SATURATION: 98 % | SYSTOLIC BLOOD PRESSURE: 118 MMHG | DIASTOLIC BLOOD PRESSURE: 97 MMHG | HEIGHT: 72 IN | HEART RATE: 87 BPM | RESPIRATION RATE: 16 BRPM

## 2023-06-21 DIAGNOSIS — Z76.0 ENCOUNTER FOR MEDICATION REFILL: Primary | ICD-10-CM

## 2023-06-21 PROCEDURE — 6370000000 HC RX 637 (ALT 250 FOR IP)

## 2023-06-21 PROCEDURE — 99283 EMERGENCY DEPT VISIT LOW MDM: CPT

## 2023-06-21 RX ORDER — ACETAMINOPHEN 500 MG
1000 TABLET ORAL 3 TIMES DAILY PRN
Qty: 30 TABLET | Refills: 0 | Status: SHIPPED | OUTPATIENT
Start: 2023-06-21

## 2023-06-21 RX ORDER — OXYCODONE HYDROCHLORIDE AND ACETAMINOPHEN 5; 325 MG/1; MG/1
1 TABLET ORAL
Status: COMPLETED | OUTPATIENT
Start: 2023-06-21 | End: 2023-06-21

## 2023-06-21 RX ORDER — IBUPROFEN 800 MG/1
800 TABLET ORAL EVERY 8 HOURS PRN
Qty: 20 TABLET | Refills: 0 | Status: SHIPPED | OUTPATIENT
Start: 2023-06-21

## 2023-06-21 RX ADMIN — OXYCODONE HYDROCHLORIDE AND ACETAMINOPHEN 1 TABLET: 5; 325 TABLET ORAL at 12:24

## 2023-06-21 ASSESSMENT — PAIN SCALES - GENERAL: PAINLEVEL_OUTOF10: 9
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
## 2023-08-12 ENCOUNTER — HOSPITAL ENCOUNTER (EMERGENCY)
Facility: HOSPITAL | Age: 28
Discharge: HOME | End: 2023-08-12
Attending: EMERGENCY MEDICINE

## 2023-08-12 VITALS
WEIGHT: 130 LBS | HEART RATE: 78 BPM | DIASTOLIC BLOOD PRESSURE: 98 MMHG | RESPIRATION RATE: 18 BRPM | SYSTOLIC BLOOD PRESSURE: 124 MMHG | TEMPERATURE: 98.2 F | BODY MASS INDEX: 17.63 KG/M2 | OXYGEN SATURATION: 100 %

## 2023-08-12 DIAGNOSIS — G89.29 CHRONIC BILATERAL LOW BACK PAIN WITHOUT SCIATICA: Primary | ICD-10-CM

## 2023-08-12 DIAGNOSIS — M54.50 CHRONIC BILATERAL LOW BACK PAIN WITHOUT SCIATICA: Primary | ICD-10-CM

## 2023-08-12 DIAGNOSIS — L89.154 PRESSURE INJURY OF SACRAL REGION, STAGE 4 (HCC): ICD-10-CM

## 2023-08-12 DIAGNOSIS — T83.511A URINARY TRACT INFECTION ASSOCIATED WITH CATHETERIZATION OF URINARY TRACT, UNSPECIFIED INDWELLING URINARY CATHETER TYPE, INITIAL ENCOUNTER (HCC): ICD-10-CM

## 2023-08-12 DIAGNOSIS — G82.20 PARAPLEGIA (HCC): ICD-10-CM

## 2023-08-12 DIAGNOSIS — N39.0 URINARY TRACT INFECTION ASSOCIATED WITH CATHETERIZATION OF URINARY TRACT, UNSPECIFIED INDWELLING URINARY CATHETER TYPE, INITIAL ENCOUNTER (HCC): ICD-10-CM

## 2023-08-12 LAB
APPEARANCE UR: ABNORMAL
BACTERIA URNS QL MICRO: ABNORMAL /HPF
BILIRUB UR QL CFM: NEGATIVE
COLOR UR: ABNORMAL
EPITH CASTS URNS QL MICRO: ABNORMAL /LPF
GLUCOSE UR STRIP.AUTO-MCNC: NEGATIVE MG/DL
HGB UR QL STRIP: NEGATIVE
KETONES UR QL STRIP.AUTO: 15 MG/DL
LEUKOCYTE ESTERASE UR QL STRIP.AUTO: ABNORMAL
NITRITE UR QL STRIP.AUTO: POSITIVE
PH UR STRIP: 7.5 (ref 5–8)
PROT UR STRIP-MCNC: 100 MG/DL
RBC #/AREA URNS HPF: ABNORMAL /HPF (ref 0–5)
SP GR UR REFRACTOMETRY: 1.02
URINE CULTURE IF INDICATED: ABNORMAL
UROBILINOGEN UR QL STRIP.AUTO: 4 EU/DL (ref 0.2–1)
WBC URNS QL MICRO: ABNORMAL /HPF (ref 0–4)

## 2023-08-12 PROCEDURE — 99283 EMERGENCY DEPT VISIT LOW MDM: CPT

## 2023-08-12 PROCEDURE — 87186 SC STD MICRODIL/AGAR DIL: CPT

## 2023-08-12 PROCEDURE — 51701 INSERT BLADDER CATHETER: CPT

## 2023-08-12 PROCEDURE — 6370000000 HC RX 637 (ALT 250 FOR IP): Performed by: EMERGENCY MEDICINE

## 2023-08-12 PROCEDURE — 81001 URINALYSIS AUTO W/SCOPE: CPT

## 2023-08-12 PROCEDURE — 87086 URINE CULTURE/COLONY COUNT: CPT

## 2023-08-12 PROCEDURE — 87077 CULTURE AEROBIC IDENTIFY: CPT

## 2023-08-12 RX ORDER — METHOCARBAMOL 750 MG/1
750 TABLET, FILM COATED ORAL 4 TIMES DAILY
Qty: 40 TABLET | Refills: 0 | Status: SHIPPED | OUTPATIENT
Start: 2023-08-12 | End: 2023-08-22

## 2023-08-12 RX ORDER — NAPROXEN 500 MG/1
500 TABLET ORAL 2 TIMES DAILY
Qty: 60 TABLET | Refills: 0 | Status: SHIPPED | OUTPATIENT
Start: 2023-08-12

## 2023-08-12 RX ORDER — NAPROXEN 250 MG/1
500 TABLET ORAL ONCE
Status: COMPLETED | OUTPATIENT
Start: 2023-08-12 | End: 2023-08-12

## 2023-08-12 RX ORDER — CIPROFLOXACIN 500 MG/1
500 TABLET, FILM COATED ORAL 2 TIMES DAILY
Qty: 20 TABLET | Refills: 0 | Status: SHIPPED | OUTPATIENT
Start: 2023-08-12 | End: 2023-08-22

## 2023-08-12 RX ORDER — METHOCARBAMOL 500 MG/1
1000 TABLET, FILM COATED ORAL ONCE
Status: COMPLETED | OUTPATIENT
Start: 2023-08-12 | End: 2023-08-12

## 2023-08-12 RX ADMIN — METHOCARBAMOL 1000 MG: 500 TABLET ORAL at 11:53

## 2023-08-12 RX ADMIN — NAPROXEN 500 MG: 250 TABLET ORAL at 11:53

## 2023-08-12 ASSESSMENT — PAIN DESCRIPTION - LOCATION: LOCATION: BACK;LEG

## 2023-08-12 ASSESSMENT — PAIN - FUNCTIONAL ASSESSMENT: PAIN_FUNCTIONAL_ASSESSMENT: 0-10

## 2023-08-12 ASSESSMENT — PAIN SCALES - GENERAL: PAINLEVEL_OUTOF10: 10

## 2023-08-12 ASSESSMENT — PAIN DESCRIPTION - ORIENTATION: ORIENTATION: LOWER;RIGHT;LEFT

## 2023-08-12 ASSESSMENT — PAIN DESCRIPTION - DESCRIPTORS: DESCRIPTORS: SHARP

## 2023-08-12 ASSESSMENT — PAIN DESCRIPTION - PAIN TYPE: TYPE: CHRONIC PAIN

## 2023-08-12 NOTE — ED NOTES
Patient (s)  given copy of dc instructions and 3 script(s). Patient (s)  verbalized understanding of instructions and script (s). Patient given a current medication reconciliation form and verbalized understanding of their medications. Patient (s) verbalized understanding of the importance of discussing medications with his or her physician or clinic they will be following up with. Patient alert and oriented and in no acute distress. Patient discharged home, patient offered wheelchair, patient declines wheelchair, but waiting in room for [facility] service.              ARTHUR Sultana  08/12/23 [id number]
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
## 2023-08-13 LAB
BACTERIA SPEC CULT: ABNORMAL
CC UR VC: ABNORMAL
SERVICE CMNT-IMP: ABNORMAL

## 2023-08-14 LAB
BACTERIA SPEC CULT: ABNORMAL
CC UR VC: ABNORMAL
SERVICE CMNT-IMP: ABNORMAL

## 2023-08-26 ENCOUNTER — FOLLOWUP TELEPHONE ENCOUNTER (OUTPATIENT)
Facility: HOSPITAL | Age: 28
End: 2023-08-26
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
